# Patient Record
Sex: FEMALE | Race: WHITE | NOT HISPANIC OR LATINO | Employment: OTHER | URBAN - METROPOLITAN AREA
[De-identification: names, ages, dates, MRNs, and addresses within clinical notes are randomized per-mention and may not be internally consistent; named-entity substitution may affect disease eponyms.]

---

## 2023-01-20 ASSESSMENT — PATIENT HEALTH QUESTIONNAIRE - PHQ9
SUM OF ALL RESPONSES TO PHQ QUESTIONS 1-9: 13
2. FEELING DOWN, DEPRESSED OR HOPELESS: NEARLY EVERY DAY
9. THOUGHTS THAT YOU WOULD BE BETTER OFF DEAD, OR OF HURTING YOURSELF: SEVERAL DAYS
3. TROUBLE FALLING OR STAYING ASLEEP: NOT AT ALL
8. MOVING OR SPEAKING SO SLOWLY THAT OTHER PEOPLE COULD HAVE NOTICED. OR THE OPPOSITE, BEING SO FIGETY OR RESTLESS THAT YOU HAVE BEEN MOVING AROUND A LOT MORE THAN USUAL: SEVERAL DAYS
7. TROUBLE CONCENTRATING ON THINGS, SUCH AS READING THE NEWSPAPER OR WATCHING TELEVISION: SEVERAL DAYS
6. FEELING BAD ABOUT YOURSELF - OR THAT YOU ARE A FAILURE OR HAVE LET YOURSELF OR YOUR FAMILY DOWN: NEARLY EVERY DAY
1. LITTLE INTEREST OR PLEASURE IN DOING THINGS: NEARLY EVERY DAY
10. IF YOU CHECKED OFF ANY PROBLEMS, HOW DIFFICULT HAVE THESE PROBLEMS MADE IT FOR YOU TO DO YOUR WORK, TAKE CARE OF THINGS AT HOME, OR GET ALONG WITH OTHER PEOPLE: VERY DIFFICULT
SUM OF ALL RESPONSES TO PHQ9 QUESTIONS 1 & 2: 6
4. FEELING TIRED OR HAVING LITTLE ENERGY: SEVERAL DAYS
5. POOR APPETITE OR OVEREATING: NOT AT ALL

## 2023-01-20 ASSESSMENT — ANXIETY QUESTIONNAIRES
3. WORRYING TOO MUCH ABOUT DIFFERENT THINGS: NEARLY EVERY DAY
6. BECOMING EASILY ANNOYED OR IRRITABLE: NEARLY EVERY DAY
5. BEING SO RESTLESS THAT IT IS HARD TO SIT STILL: NOT AT ALL
1. FEELING NERVOUS, ANXIOUS, OR ON EDGE: NEARLY EVERY DAY
7. FEELING AFRAID AS IF SOMETHING AWFUL MIGHT HAPPEN: NOT AT ALL
GAD7 TOTAL SCORE: 13
4. TROUBLE RELAXING: SEVERAL DAYS
2. NOT BEING ABLE TO STOP OR CONTROL WORRYING: NEARLY EVERY DAY
IF YOU CHECKED OFF ANY PROBLEMS ON THIS QUESTIONNAIRE, HOW DIFFICULT HAVE THESE PROBLEMS MADE IT FOR YOU TO DO YOUR WORK, TAKE CARE OF THINGS AT HOME, OR GET ALONG WITH OTHER PEOPLE: SOMEWHAT DIFFICULT

## 2023-02-14 ASSESSMENT — PATIENT HEALTH QUESTIONNAIRE - PHQ9
SUM OF ALL RESPONSES TO PHQ QUESTIONS 1-9: 19
7. TROUBLE CONCENTRATING ON THINGS, SUCH AS READING THE NEWSPAPER OR WATCHING TELEVISION: MORE THAN HALF THE DAYS
SUM OF ALL RESPONSES TO PHQ9 QUESTIONS 1 & 2: 6
9. THOUGHTS THAT YOU WOULD BE BETTER OFF DEAD, OR OF HURTING YOURSELF: NOT AT ALL
6. FEELING BAD ABOUT YOURSELF - OR THAT YOU ARE A FAILURE OR HAVE LET YOURSELF OR YOUR FAMILY DOWN: NEARLY EVERY DAY
3. TROUBLE FALLING OR STAYING ASLEEP: SEVERAL DAYS
10. IF YOU CHECKED OFF ANY PROBLEMS, HOW DIFFICULT HAVE THESE PROBLEMS MADE IT FOR YOU TO DO YOUR WORK, TAKE CARE OF THINGS AT HOME, OR GET ALONG WITH OTHER PEOPLE: VERY DIFFICULT
8. MOVING OR SPEAKING SO SLOWLY THAT OTHER PEOPLE COULD HAVE NOTICED. OR THE OPPOSITE, BEING SO FIGETY OR RESTLESS THAT YOU HAVE BEEN MOVING AROUND A LOT MORE THAN USUAL: NEARLY EVERY DAY
2. FEELING DOWN, DEPRESSED OR HOPELESS: NEARLY EVERY DAY
1. LITTLE INTEREST OR PLEASURE IN DOING THINGS: NEARLY EVERY DAY
4. FEELING TIRED OR HAVING LITTLE ENERGY: MORE THAN HALF THE DAYS
5. POOR APPETITE OR OVEREATING: MORE THAN HALF THE DAYS

## 2023-02-14 ASSESSMENT — ANXIETY QUESTIONNAIRES
3. WORRYING TOO MUCH ABOUT DIFFERENT THINGS: NEARLY EVERY DAY
1. FEELING NERVOUS, ANXIOUS, OR ON EDGE: NEARLY EVERY DAY
6. BECOMING EASILY ANNOYED OR IRRITABLE: MORE THAN HALF THE DAYS
4. TROUBLE RELAXING: NEARLY EVERY DAY
5. BEING SO RESTLESS THAT IT IS HARD TO SIT STILL: MORE THAN HALF THE DAYS
2. NOT BEING ABLE TO STOP OR CONTROL WORRYING: MORE THAN HALF THE DAYS
7. FEELING AFRAID AS IF SOMETHING AWFUL MIGHT HAPPEN: NOT AT ALL
IF YOU CHECKED OFF ANY PROBLEMS ON THIS QUESTIONNAIRE, HOW DIFFICULT HAVE THESE PROBLEMS MADE IT FOR YOU TO DO YOUR WORK, TAKE CARE OF THINGS AT HOME, OR GET ALONG WITH OTHER PEOPLE: NOT DIFFICULT AT ALL
GAD7 TOTAL SCORE: 15

## 2023-03-04 PROBLEM — R03.0 ELEVATED BP WITHOUT DIAGNOSIS OF HYPERTENSION: Status: ACTIVE | Noted: 2023-03-04

## 2023-03-04 PROBLEM — E66.811 CLASS 1 OBESITY WITH BODY MASS INDEX (BMI) OF 31.0 TO 31.9 IN ADULT: Status: ACTIVE | Noted: 2023-03-04

## 2023-03-04 PROBLEM — L30.9 DERMATITIS, ECZEMATOID: Status: ACTIVE | Noted: 2023-03-04

## 2023-03-04 PROBLEM — F34.1 PRIMARY DYSTHYMIA: Status: ACTIVE | Noted: 2023-03-04

## 2023-03-04 PROBLEM — G47.00 PERSISTENT INSOMNIA: Status: ACTIVE | Noted: 2023-03-04

## 2023-03-04 PROBLEM — K44.9 HIATAL HERNIA: Status: ACTIVE | Noted: 2023-03-04

## 2023-03-04 PROBLEM — K59.09 CHRONIC CONSTIPATION: Status: ACTIVE | Noted: 2023-03-04

## 2023-03-04 PROBLEM — E03.9 ADULT HYPOTHYROIDISM: Status: ACTIVE | Noted: 2023-03-04

## 2023-03-04 PROBLEM — E66.9 CLASS 1 OBESITY WITH BODY MASS INDEX (BMI) OF 31.0 TO 31.9 IN ADULT: Status: ACTIVE | Noted: 2023-03-04

## 2023-03-04 PROBLEM — D72.829 LEUKOCYTOSIS: Status: ACTIVE | Noted: 2023-03-04

## 2023-03-04 PROBLEM — R73.09 ELEVATED GLUCOSE: Status: ACTIVE | Noted: 2023-03-04

## 2023-03-04 PROBLEM — F43.23 SITUATIONAL MIXED ANXIETY AND DEPRESSIVE DISORDER: Status: ACTIVE | Noted: 2023-03-04

## 2023-03-04 PROBLEM — M54.50 LOW BACK PAIN: Status: ACTIVE | Noted: 2023-03-04

## 2023-03-04 PROBLEM — F32.4 DEPRESSION, MAJOR, IN PARTIAL REMISSION (CMS-HCC): Status: ACTIVE | Noted: 2023-03-04

## 2023-03-04 PROBLEM — E78.5 HYPERLIPIDEMIA: Status: ACTIVE | Noted: 2023-03-04

## 2023-03-04 PROBLEM — H69.93 DYSFUNCTION OF BOTH EUSTACHIAN TUBES: Status: ACTIVE | Noted: 2023-03-04

## 2023-03-04 PROBLEM — K21.9 GERD (GASTROESOPHAGEAL REFLUX DISEASE): Status: ACTIVE | Noted: 2023-03-04

## 2023-03-04 PROBLEM — R14.0 ABDOMINAL BLOATING: Status: ACTIVE | Noted: 2023-03-04

## 2023-03-04 PROBLEM — R41.3 MEMORY DEFICIT: Status: ACTIVE | Noted: 2023-03-04

## 2023-03-04 PROBLEM — S22.070A COMPRESSION FRACTURE OF T10 VERTEBRA (MULTI): Status: ACTIVE | Noted: 2023-03-04

## 2023-03-04 RX ORDER — CHOLECALCIFEROL (VITAMIN D3) 50 MCG
TABLET ORAL
COMMUNITY

## 2023-03-04 RX ORDER — LEVOTHYROXINE SODIUM 25 UG/1
1 TABLET ORAL DAILY
COMMUNITY
Start: 2022-10-10 | End: 2023-05-22

## 2023-03-04 RX ORDER — OMEPRAZOLE 40 MG/1
1 CAPSULE, DELAYED RELEASE ORAL DAILY
COMMUNITY
Start: 2022-07-26 | End: 2023-07-10

## 2023-03-04 RX ORDER — CALCIUM CARBONATE 300MG(750)
TABLET,CHEWABLE ORAL
COMMUNITY
Start: 2020-11-17

## 2023-03-04 RX ORDER — SIMVASTATIN 40 MG/1
1 TABLET, FILM COATED ORAL DAILY
COMMUNITY
Start: 2013-09-03 | End: 2023-09-21 | Stop reason: SDUPTHER

## 2023-03-04 RX ORDER — FAMOTIDINE 40 MG/1
1 TABLET, FILM COATED ORAL 2 TIMES DAILY
COMMUNITY
Start: 2022-10-18 | End: 2023-06-19 | Stop reason: SDUPTHER

## 2023-03-04 RX ORDER — PROPRANOLOL HYDROCHLORIDE 10 MG/1
1 TABLET ORAL AS NEEDED
COMMUNITY
Start: 2022-01-27 | End: 2023-04-03

## 2023-03-04 RX ORDER — KETOROLAC TROMETHAMINE 30 MG/ML
2 INJECTION, SOLUTION INTRAMUSCULAR; INTRAVENOUS
COMMUNITY
Start: 2022-10-13 | End: 2023-05-22 | Stop reason: ALTCHOICE

## 2023-03-04 RX ORDER — MULTIVITAMIN
TABLET ORAL
COMMUNITY

## 2023-03-04 RX ORDER — BUPROPION HYDROCHLORIDE 150 MG/1
1 TABLET ORAL DAILY
COMMUNITY
Start: 2022-07-26 | End: 2023-07-10

## 2023-03-04 RX ORDER — ASPIRIN 81 MG/1
TABLET ORAL
COMMUNITY

## 2023-03-04 RX ORDER — CITALOPRAM 20 MG/1
2 TABLET, FILM COATED ORAL DAILY
COMMUNITY
End: 2023-07-20 | Stop reason: DRUGHIGH

## 2023-03-04 RX ORDER — FLUTICASONE PROPIONATE 50 MCG
2 SPRAY, SUSPENSION (ML) NASAL DAILY
COMMUNITY
Start: 2020-09-24 | End: 2023-04-03

## 2023-03-08 ENCOUNTER — SOCIAL WORK (OUTPATIENT)
Dept: PRIMARY CARE | Facility: CLINIC | Age: 83
End: 2023-03-08
Payer: MEDICARE

## 2023-03-08 NOTE — PROGRESS NOTES
Collaborative Care (CoCM)  Progress Note    Type of Interaction: In Office    Start Time: 1:00 PM    End Time: 1:45 PM        Appointment: Scheduled    Reason for Visit: No chief complaint on file.   Follow Up Visit with Collaborative Care      Interval History / Patient Symptoms:     No data recorded  Patient reports that she is feeling better overall.  States that she has been taking her medication as prescribed.  Brought in her HW from previous appointment; Patient has been socializing, going to Worship and shopping with her daughter.      Interventions Provided: Behavioral Activation and Homework F/U      Progress Made: Moderate    Response to Intervention: Patient reported feeling more motivated to clean her dining room table; states she feels better when she accomplishes tasks.        Plan:     Care Plan    There is no care plan documentation to display.         There are no Patient Instructions on file for this visit.      Follow Up / Next Appointment:

## 2023-03-22 ENCOUNTER — SOCIAL WORK (OUTPATIENT)
Dept: PRIMARY CARE | Facility: CLINIC | Age: 83
End: 2023-03-22
Payer: MEDICARE

## 2023-03-22 ASSESSMENT — PATIENT HEALTH QUESTIONNAIRE - PHQ9
2. FEELING DOWN, DEPRESSED OR HOPELESS: SEVERAL DAYS
1. LITTLE INTEREST OR PLEASURE IN DOING THINGS: SEVERAL DAYS
4. FEELING TIRED OR HAVING LITTLE ENERGY: NOT AT ALL
3. TROUBLE FALLING OR STAYING ASLEEP: NOT AT ALL
10. IF YOU CHECKED OFF ANY PROBLEMS, HOW DIFFICULT HAVE THESE PROBLEMS MADE IT FOR YOU TO DO YOUR WORK, TAKE CARE OF THINGS AT HOME, OR GET ALONG WITH OTHER PEOPLE: SOMEWHAT DIFFICULT
6. FEELING BAD ABOUT YOURSELF - OR THAT YOU ARE A FAILURE OR HAVE LET YOURSELF OR YOUR FAMILY DOWN: SEVERAL DAYS
9. THOUGHTS THAT YOU WOULD BE BETTER OFF DEAD, OR OF HURTING YOURSELF: NOT AT ALL
8. MOVING OR SPEAKING SO SLOWLY THAT OTHER PEOPLE COULD HAVE NOTICED. OR THE OPPOSITE, BEING SO FIGETY OR RESTLESS THAT YOU HAVE BEEN MOVING AROUND A LOT MORE THAN USUAL: NOT AT ALL
5. POOR APPETITE OR OVEREATING: NOT AT ALL
7. TROUBLE CONCENTRATING ON THINGS, SUCH AS READING THE NEWSPAPER OR WATCHING TELEVISION: SEVERAL DAYS
SUM OF ALL RESPONSES TO PHQ9 QUESTIONS 1 & 2: 2
SUM OF ALL RESPONSES TO PHQ QUESTIONS 1-9: 4

## 2023-03-22 ASSESSMENT — ANXIETY QUESTIONNAIRES
5. BEING SO RESTLESS THAT IT IS HARD TO SIT STILL: SEVERAL DAYS
2. NOT BEING ABLE TO STOP OR CONTROL WORRYING: MORE THAN HALF THE DAYS
7. FEELING AFRAID AS IF SOMETHING AWFUL MIGHT HAPPEN: NOT AT ALL
GAD7 TOTAL SCORE: 8
4. TROUBLE RELAXING: MORE THAN HALF THE DAYS
3. WORRYING TOO MUCH ABOUT DIFFERENT THINGS: SEVERAL DAYS
1. FEELING NERVOUS, ANXIOUS, OR ON EDGE: SEVERAL DAYS
IF YOU CHECKED OFF ANY PROBLEMS ON THIS QUESTIONNAIRE, HOW DIFFICULT HAVE THESE PROBLEMS MADE IT FOR YOU TO DO YOUR WORK, TAKE CARE OF THINGS AT HOME, OR GET ALONG WITH OTHER PEOPLE: SOMEWHAT DIFFICULT
6. BECOMING EASILY ANNOYED OR IRRITABLE: SEVERAL DAYS

## 2023-03-22 NOTE — PROGRESS NOTES
Collaborative Care (CoCM)  Progress Note    Type of Interaction: In Office    Start Time: 1:04 PM    End Time: 2:00 PM        Appointment: Scheduled    Reason for Visit:   Chief Complaint   Patient presents with    Follow-up    2 week follow up with CoCM      Interval History / Patient Symptoms:     Patient Health Questionnaire-9 Score: 4 (3/22/2023  1:39 PM)  ROJELIO-7 Total Score: 8 (3/22/2023  1:27 PM)    Patient reports socializing more over the past 2 weeks; states that she has attended 2 funerals and went to the luncheon after the  this week.  States more motivation to call friends and get out and about.    Wellbutrin 150mg- doing well on dose, takes medication as prescribed and doesn't miss doses, per patient.    Patient's PHQ and ROJELIO scores improved.    Interventions Provided: Behavioral Activation      Progress Made: Moderate    Response to Intervention: Patient describes feeling more accountable in her actions and what she needs to be doing, and has been following through with suggestions from providers regarding her health and wellbeing.          Plan:     Care Plan    There is no care plan documentation to display.         There are no Patient Instructions on file for this visit.      Follow Up / Next Appointment:

## 2023-03-24 DIAGNOSIS — H69.83 OTHER SPECIFIED DISORDERS OF EUSTACHIAN TUBE, BILATERAL: ICD-10-CM

## 2023-03-24 DIAGNOSIS — F41.9 ANXIETY: Primary | ICD-10-CM

## 2023-04-03 RX ORDER — FLUTICASONE PROPIONATE 50 MCG
SPRAY, SUSPENSION (ML) NASAL
Qty: 48 ML | Refills: 1 | Status: SHIPPED | OUTPATIENT
Start: 2023-04-03 | End: 2023-09-21 | Stop reason: SDUPTHER

## 2023-04-03 RX ORDER — PROPRANOLOL HYDROCHLORIDE 10 MG/1
TABLET ORAL
Qty: 90 TABLET | Refills: 1 | Status: SHIPPED | OUTPATIENT
Start: 2023-04-03 | End: 2023-04-06 | Stop reason: SDUPTHER

## 2023-04-06 ENCOUNTER — SOCIAL WORK (OUTPATIENT)
Dept: PRIMARY CARE | Facility: CLINIC | Age: 83
End: 2023-04-06
Payer: MEDICARE

## 2023-04-06 DIAGNOSIS — F41.9 ANXIETY: ICD-10-CM

## 2023-04-06 ASSESSMENT — PATIENT HEALTH QUESTIONNAIRE - PHQ9
3. TROUBLE FALLING OR STAYING ASLEEP: NOT AT ALL
1. LITTLE INTEREST OR PLEASURE IN DOING THINGS: SEVERAL DAYS
SUM OF ALL RESPONSES TO PHQ9 QUESTIONS 1 & 2: 1
6. FEELING BAD ABOUT YOURSELF - OR THAT YOU ARE A FAILURE OR HAVE LET YOURSELF OR YOUR FAMILY DOWN: SEVERAL DAYS
8. MOVING OR SPEAKING SO SLOWLY THAT OTHER PEOPLE COULD HAVE NOTICED. OR THE OPPOSITE, BEING SO FIGETY OR RESTLESS THAT YOU HAVE BEEN MOVING AROUND A LOT MORE THAN USUAL: SEVERAL DAYS
10. IF YOU CHECKED OFF ANY PROBLEMS, HOW DIFFICULT HAVE THESE PROBLEMS MADE IT FOR YOU TO DO YOUR WORK, TAKE CARE OF THINGS AT HOME, OR GET ALONG WITH OTHER PEOPLE: NOT DIFFICULT AT ALL
9. THOUGHTS THAT YOU WOULD BE BETTER OFF DEAD, OR OF HURTING YOURSELF: NOT AT ALL
2. FEELING DOWN, DEPRESSED OR HOPELESS: NOT AT ALL
7. TROUBLE CONCENTRATING ON THINGS, SUCH AS READING THE NEWSPAPER OR WATCHING TELEVISION: SEVERAL DAYS
4. FEELING TIRED OR HAVING LITTLE ENERGY: SEVERAL DAYS
5. POOR APPETITE OR OVEREATING: NOT AT ALL
SUM OF ALL RESPONSES TO PHQ QUESTIONS 1-9: 5

## 2023-04-06 ASSESSMENT — ANXIETY QUESTIONNAIRES
IF YOU CHECKED OFF ANY PROBLEMS ON THIS QUESTIONNAIRE, HOW DIFFICULT HAVE THESE PROBLEMS MADE IT FOR YOU TO DO YOUR WORK, TAKE CARE OF THINGS AT HOME, OR GET ALONG WITH OTHER PEOPLE: SOMEWHAT DIFFICULT
2. NOT BEING ABLE TO STOP OR CONTROL WORRYING: SEVERAL DAYS
6. BECOMING EASILY ANNOYED OR IRRITABLE: NOT AT ALL
5. BEING SO RESTLESS THAT IT IS HARD TO SIT STILL: SEVERAL DAYS
1. FEELING NERVOUS, ANXIOUS, OR ON EDGE: SEVERAL DAYS
3. WORRYING TOO MUCH ABOUT DIFFERENT THINGS: SEVERAL DAYS
4. TROUBLE RELAXING: SEVERAL DAYS
7. FEELING AFRAID AS IF SOMETHING AWFUL MIGHT HAPPEN: NOT AT ALL
GAD7 TOTAL SCORE: 5

## 2023-04-06 NOTE — PROGRESS NOTES
Collaborative Care (CoCM)  Progress Note    Type of Interaction: In Office    Start Time: 1:02PM    End Time: 1:45 PM        Appointment: Scheduled    Reason for Visit:   Chief Complaint   Patient presents with    Follow-up    Depression    Anxiety          Interval History / Patient Symptoms:     Patient Health Questionnaire-9 Score: 5 (4/6/2023  1:22 PM)  ROJELIO-7 Total Score: 5 (4/6/2023  1:19 PM)      Bloodwork all came back ok  Still stomach issues  Funerals- saw people and reconnected  Went to [employer] for a day with daughter and saw family  Went to the [employer]- ate dinner out  Has not been to [employer] lately  Went to mall and walked  Feels better overall    Interventions Provided: Behavioral Activation      Progress Made: Moderate    Response to Intervention: Explored continuing behavioral activation with patient; identified ways that she has broken the cycle of depression and ways to continue to engage in activities that she enjoys.  Patient reports that she often thinks about what we talk about during appointments and tries to utilize the strategies.        Plan:     Care Plan    There is no care plan documentation to display.         There are no Patient Instructions on file for this visit.      Follow Up / Next Appointment: Next appointment: 04/20/23

## 2023-04-07 ENCOUNTER — DOCUMENTATION (OUTPATIENT)
Dept: PRIMARY CARE | Facility: CLINIC | Age: 83
End: 2023-04-07
Payer: MEDICARE

## 2023-04-07 DIAGNOSIS — F33.41 RECURRENT MAJOR DEPRESSIVE DISORDER, IN PARTIAL REMISSION (CMS-HCC): Primary | ICD-10-CM

## 2023-04-07 PROCEDURE — 99493 SBSQ PSYC COLLAB CARE MGMT: CPT | Performed by: FAMILY MEDICINE

## 2023-04-07 PROCEDURE — 99494 1ST/SBSQ PSYC COLLAB CARE: CPT | Performed by: FAMILY MEDICINE

## 2023-04-07 RX ORDER — PROPRANOLOL HYDROCHLORIDE 10 MG/1
TABLET ORAL
Qty: 90 TABLET | Refills: 0 | Status: SHIPPED | OUTPATIENT
Start: 2023-04-07 | End: 2023-10-10

## 2023-04-20 ENCOUNTER — SOCIAL WORK (OUTPATIENT)
Dept: PRIMARY CARE | Facility: CLINIC | Age: 83
End: 2023-04-20
Payer: MEDICARE

## 2023-04-20 ENCOUNTER — TELEPHONE (OUTPATIENT)
Dept: PRIMARY CARE | Facility: CLINIC | Age: 83
End: 2023-04-20

## 2023-04-20 ASSESSMENT — PATIENT HEALTH QUESTIONNAIRE - PHQ9
3. TROUBLE FALLING OR STAYING ASLEEP: SEVERAL DAYS
SUM OF ALL RESPONSES TO PHQ9 QUESTIONS 1 & 2: 1
5. POOR APPETITE OR OVEREATING: NOT AT ALL
2. FEELING DOWN, DEPRESSED OR HOPELESS: NOT AT ALL
1. LITTLE INTEREST OR PLEASURE IN DOING THINGS: SEVERAL DAYS
9. THOUGHTS THAT YOU WOULD BE BETTER OFF DEAD, OR OF HURTING YOURSELF: NOT AT ALL
6. FEELING BAD ABOUT YOURSELF - OR THAT YOU ARE A FAILURE OR HAVE LET YOURSELF OR YOUR FAMILY DOWN: NOT AT ALL
10. IF YOU CHECKED OFF ANY PROBLEMS, HOW DIFFICULT HAVE THESE PROBLEMS MADE IT FOR YOU TO DO YOUR WORK, TAKE CARE OF THINGS AT HOME, OR GET ALONG WITH OTHER PEOPLE: SOMEWHAT DIFFICULT
8. MOVING OR SPEAKING SO SLOWLY THAT OTHER PEOPLE COULD HAVE NOTICED. OR THE OPPOSITE, BEING SO FIGETY OR RESTLESS THAT YOU HAVE BEEN MOVING AROUND A LOT MORE THAN USUAL: NOT AT ALL
SUM OF ALL RESPONSES TO PHQ QUESTIONS 1-9: 4
7. TROUBLE CONCENTRATING ON THINGS, SUCH AS READING THE NEWSPAPER OR WATCHING TELEVISION: SEVERAL DAYS
4. FEELING TIRED OR HAVING LITTLE ENERGY: SEVERAL DAYS

## 2023-04-20 ASSESSMENT — ANXIETY QUESTIONNAIRES
GAD7 TOTAL SCORE: 3
3. WORRYING TOO MUCH ABOUT DIFFERENT THINGS: SEVERAL DAYS
4. TROUBLE RELAXING: NOT AT ALL
7. FEELING AFRAID AS IF SOMETHING AWFUL MIGHT HAPPEN: NOT AT ALL
IF YOU CHECKED OFF ANY PROBLEMS ON THIS QUESTIONNAIRE, HOW DIFFICULT HAVE THESE PROBLEMS MADE IT FOR YOU TO DO YOUR WORK, TAKE CARE OF THINGS AT HOME, OR GET ALONG WITH OTHER PEOPLE: SOMEWHAT DIFFICULT
5. BEING SO RESTLESS THAT IT IS HARD TO SIT STILL: NOT AT ALL
2. NOT BEING ABLE TO STOP OR CONTROL WORRYING: NOT AT ALL
1. FEELING NERVOUS, ANXIOUS, OR ON EDGE: SEVERAL DAYS
6. BECOMING EASILY ANNOYED OR IRRITABLE: SEVERAL DAYS

## 2023-04-20 NOTE — TELEPHONE ENCOUNTER
"Patient stopped and spoke with me today and she was states that she has stopped taking her Synthroid for the last week and she states she is \"feeling much better\" and is not wanting to restart, she is asking if she can stay off of this or does she need to go back on it?  "

## 2023-04-20 NOTE — PROGRESS NOTES
"Collaborative Care (CoCM)  Progress Note    Type of Interaction: In Office    Start Time: 1:00 PM    End Time: 1:45 PM        Appointment: Scheduled    Reason for Visit:   Chief Complaint   Patient presents with    Follow-up    Anxiety    Depression          Interval History / Patient Symptoms:     Patient Health Questionnaire-9 Score: 4 (4/20/2023  1:15 PM)  ROJELIO-7 Total Score: 3 (4/20/2023  1:15 PM)    Ultrasound on Saturday re: stomach  Took self off synthroid by herself- states that she has been more clear-headed \"I feel like my head's back on\"- week to 10 days    Been walking more  At the mall, Grocery store  [employer]  Went to dinner with daughter and friends  Took friend who has cancer to lunch and shopping  Sleep is good; well balanced meals      Interventions Provided: Behavioral Activation      Progress Made: Moderate    Response to Intervention:   Admits she needs to clean my dining room table  Worried about Saturday- will tackle other issues after that  When feeling anxious- keeping busy to take mind off; went to library and got a book; hasn't gotten a puzzle yet    Assisted patient with scheduling appointment for thyroid concerns    Plan:     Care Plan    There is no care plan documentation to display.         There are no Patient Instructions on file for this visit.      Follow Up / Next Appointment: Next appointment: 05/04/23      "

## 2023-04-24 DIAGNOSIS — E03.9 ADULT HYPOTHYROIDISM: Primary | ICD-10-CM

## 2023-05-01 ENCOUNTER — DOCUMENTATION (OUTPATIENT)
Dept: PRIMARY CARE | Facility: CLINIC | Age: 83
End: 2023-05-01
Payer: MEDICARE

## 2023-05-01 DIAGNOSIS — F32.4 MAJOR DEPRESSIVE DISORDER IN PARTIAL REMISSION, UNSPECIFIED WHETHER RECURRENT (CMS-HCC): Primary | ICD-10-CM

## 2023-05-01 PROCEDURE — 99493 SBSQ PSYC COLLAB CARE MGMT: CPT | Performed by: FAMILY MEDICINE

## 2023-05-01 PROCEDURE — 99494 1ST/SBSQ PSYC COLLAB CARE: CPT | Performed by: FAMILY MEDICINE

## 2023-05-04 ENCOUNTER — SOCIAL WORK (OUTPATIENT)
Dept: PRIMARY CARE | Facility: CLINIC | Age: 83
End: 2023-05-04
Payer: MEDICARE

## 2023-05-04 DIAGNOSIS — F32.4 MAJOR DEPRESSIVE DISORDER IN PARTIAL REMISSION, UNSPECIFIED WHETHER RECURRENT (CMS-HCC): Primary | ICD-10-CM

## 2023-05-04 ASSESSMENT — ANXIETY QUESTIONNAIRES
GAD7 TOTAL SCORE: 2
6. BECOMING EASILY ANNOYED OR IRRITABLE: NOT AT ALL
5. BEING SO RESTLESS THAT IT IS HARD TO SIT STILL: SEVERAL DAYS
7. FEELING AFRAID AS IF SOMETHING AWFUL MIGHT HAPPEN: NOT AT ALL
4. TROUBLE RELAXING: SEVERAL DAYS
3. WORRYING TOO MUCH ABOUT DIFFERENT THINGS: NOT AT ALL
2. NOT BEING ABLE TO STOP OR CONTROL WORRYING: NOT AT ALL
IF YOU CHECKED OFF ANY PROBLEMS ON THIS QUESTIONNAIRE, HOW DIFFICULT HAVE THESE PROBLEMS MADE IT FOR YOU TO DO YOUR WORK, TAKE CARE OF THINGS AT HOME, OR GET ALONG WITH OTHER PEOPLE: NOT DIFFICULT AT ALL
1. FEELING NERVOUS, ANXIOUS, OR ON EDGE: NOT AT ALL

## 2023-05-04 ASSESSMENT — PATIENT HEALTH QUESTIONNAIRE - PHQ9
6. FEELING BAD ABOUT YOURSELF - OR THAT YOU ARE A FAILURE OR HAVE LET YOURSELF OR YOUR FAMILY DOWN: SEVERAL DAYS
7. TROUBLE CONCENTRATING ON THINGS, SUCH AS READING THE NEWSPAPER OR WATCHING TELEVISION: SEVERAL DAYS
3. TROUBLE FALLING OR STAYING ASLEEP: NOT AT ALL
SUM OF ALL RESPONSES TO PHQ QUESTIONS 1-9: 4
5. POOR APPETITE OR OVEREATING: NOT AT ALL
SUM OF ALL RESPONSES TO PHQ9 QUESTIONS 1 & 2: 2
9. THOUGHTS THAT YOU WOULD BE BETTER OFF DEAD, OR OF HURTING YOURSELF: NOT AT ALL
1. LITTLE INTEREST OR PLEASURE IN DOING THINGS: SEVERAL DAYS
8. MOVING OR SPEAKING SO SLOWLY THAT OTHER PEOPLE COULD HAVE NOTICED. OR THE OPPOSITE, BEING SO FIGETY OR RESTLESS THAT YOU HAVE BEEN MOVING AROUND A LOT MORE THAN USUAL: NOT AT ALL
4. FEELING TIRED OR HAVING LITTLE ENERGY: NOT AT ALL
10. IF YOU CHECKED OFF ANY PROBLEMS, HOW DIFFICULT HAVE THESE PROBLEMS MADE IT FOR YOU TO DO YOUR WORK, TAKE CARE OF THINGS AT HOME, OR GET ALONG WITH OTHER PEOPLE: NOT DIFFICULT AT ALL
2. FEELING DOWN, DEPRESSED OR HOPELESS: SEVERAL DAYS

## 2023-05-04 NOTE — PROGRESS NOTES
Collaborative Care (CoCM)  Progress Note    Type of Interaction: In Office    Start Time: 1:05 PM    End Time: 1:45 PM    Appointment: Scheduled    Reason for Visit:   Chief Complaint   Patient presents with    Follow-up      Interval History / Patient Symptoms:     Patient Health Questionnaire-9 Score: 4 (5/4/2023  1:48 PM)  ROJELIO-7 Total Score: 2 (5/4/2023  1:47 PM)    Went to [facility] yesterday  Has been out to dinner a few times  Took initiative to go to neighbor's house    Interventions Provided: Behavioral Activation and Motivational Interviewing      Progress Made: Significant    Response to Intervention:   Patient engaged and participated in discussion regarding progress made and continuing to utilize behavioral activation.        Plan:     Work on: exercising   Read  [employer] group/S[employer]ian[employer] group      Follow Up / Next Appointment: Next appointment: 05/18/23

## 2023-05-18 ENCOUNTER — APPOINTMENT (OUTPATIENT)
Dept: PRIMARY CARE | Facility: CLINIC | Age: 83
End: 2023-05-18
Payer: MEDICARE

## 2023-05-19 ENCOUNTER — SOCIAL WORK (OUTPATIENT)
Dept: PRIMARY CARE | Facility: CLINIC | Age: 83
End: 2023-05-19
Payer: MEDICARE

## 2023-05-19 DIAGNOSIS — F32.4 MAJOR DEPRESSIVE DISORDER IN PARTIAL REMISSION, UNSPECIFIED WHETHER RECURRENT (CMS-HCC): Primary | ICD-10-CM

## 2023-05-19 NOTE — PROGRESS NOTES
Collaborative Care (CoCM)  Progress Note    Type of Interaction: In Office    Start Time: 1:05 PM    End Time: 1:51 PM        Appointment: Scheduled    Reason for Visit:   Chief Complaint   Patient presents with    Follow-up    Depression      Interval History / Patient Symptoms:     Continues to take meds as prescribed      Interventions Provided: Motivational Interviewing      Progress Made: Moderate    Response to Intervention:   Still a little stressed out- struggles to come to terms and understand health issues of recent   needs to go for walks more often- makes her feel better  Got a puzzle to do  Continues to socialize with friends  Reading magazines and newspapers  Takes friend to get hair done and chats with the other people there    Plan:     Continue to work on staying active   Reduce anxiety of health problems by making a PCP appointment      Follow Up / Next Appointment: Next appointment: 06/05/23

## 2023-05-22 ENCOUNTER — OFFICE VISIT (OUTPATIENT)
Dept: PRIMARY CARE | Facility: CLINIC | Age: 83
End: 2023-05-22
Payer: MEDICARE

## 2023-05-22 VITALS — OXYGEN SATURATION: 97 % | SYSTOLIC BLOOD PRESSURE: 124 MMHG | DIASTOLIC BLOOD PRESSURE: 80 MMHG | HEART RATE: 80 BPM

## 2023-05-22 DIAGNOSIS — D72.829 LEUKOCYTOSIS, UNSPECIFIED TYPE: Primary | ICD-10-CM

## 2023-05-22 DIAGNOSIS — J40 BRONCHITIS: ICD-10-CM

## 2023-05-22 DIAGNOSIS — F33.41 RECURRENT MAJOR DEPRESSIVE DISORDER, IN PARTIAL REMISSION (CMS-HCC): ICD-10-CM

## 2023-05-22 PROCEDURE — 99214 OFFICE O/P EST MOD 30 MIN: CPT | Performed by: FAMILY MEDICINE

## 2023-05-22 PROCEDURE — 1159F MED LIST DOCD IN RCRD: CPT | Performed by: FAMILY MEDICINE

## 2023-05-22 PROCEDURE — 1160F RVW MEDS BY RX/DR IN RCRD: CPT | Performed by: FAMILY MEDICINE

## 2023-05-22 PROCEDURE — 1036F TOBACCO NON-USER: CPT | Performed by: FAMILY MEDICINE

## 2023-05-22 PROCEDURE — 1157F ADVNC CARE PLAN IN RCRD: CPT | Performed by: FAMILY MEDICINE

## 2023-05-22 RX ORDER — AZITHROMYCIN 250 MG/1
TABLET, FILM COATED ORAL
Qty: 6 TABLET | Refills: 0 | Status: SHIPPED | OUTPATIENT
Start: 2023-05-22 | End: 2023-05-27

## 2023-05-22 RX ORDER — ALBUTEROL SULFATE 90 UG/1
2 AEROSOL, METERED RESPIRATORY (INHALATION) EVERY 4 HOURS PRN
Qty: 8.5 G | Refills: 0 | Status: SHIPPED | OUTPATIENT
Start: 2023-05-22 | End: 2024-05-21

## 2023-05-22 NOTE — PATIENT INSTRUCTIONS
Mucinex DM - for your cough.    Zithromax 250 mg - take 2 tablets today and then 1 tablet daily for 4 days.    Albuterol MDI 2 puffs every 4-6 hours as needed.

## 2023-05-22 NOTE — PROGRESS NOTES
Subjective   Patient ID: Diana Azul is a 83 y.o. female who presents for Follow-up.  HPI    CONGESTION & COLD: she has had congestion for the last several weeks   Treated at [facility] an treated with atb and Tessalon  Finished a few days ago and not much better     LEUKOCYTOSIS: Follow up with heme/oncology     HYPERLIPIDEMIA: tolerating simvastatin without muscle achiness or weakness       Review of Systems    Review of Systems negative except as noted in HPI and Chief complaint.     Objective               Physical Exam  Constitutional:       General: She is not in acute distress.     Appearance: Normal appearance. She is not ill-appearing.   HENT:      Head: Normocephalic and atraumatic.   Neck:      Vascular: No carotid bruit.   Cardiovascular:      Rate and Rhythm: Normal rate and regular rhythm.      Pulses: Normal pulses.      Heart sounds: Normal heart sounds. No murmur heard.     No gallop.   Pulmonary:      Effort: Pulmonary effort is normal.      Breath sounds: Rhonchi present. No wheezing or rales.      Comments: Course rhonchi anteriorly - clears somewhat with cough  Musculoskeletal:      Cervical back: Normal range of motion and neck supple. No rigidity or tenderness.   Lymphadenopathy:      Cervical: No cervical adenopathy.   Skin:     General: Skin is warm and dry.   Neurological:      Mental Status: She is alert.   Psychiatric:         Mood and Affect: Mood normal.         Behavior: Behavior normal.       /80 (BP Location: Left arm, Patient Position: Sitting, BP Cuff Size: Adult)   Pulse 80   SpO2 97%      Assessment/Plan   Problem List Items Addressed This Visit          Respiratory    Bronchitis    Relevant Medications    albuterol (ProAir HFA) 90 mcg/actuation inhaler       Hematologic    Leukocytosis - Primary    Relevant Orders    CBC and Auto Differential    Comprehensive Metabolic Panel       Other    Depression, major, in partial remission (CMS/HCC)     Chronic  Condition Documentation: Worsening based on symptoms and exam. Treatment plan established. Disease monitoring and precautions, any treatment changes, patient instructions, and follow-up are documented in encounter note.   Follow up 6 months.

## 2023-05-23 ENCOUNTER — TELEPHONE (OUTPATIENT)
Dept: PRIMARY CARE | Facility: CLINIC | Age: 83
End: 2023-05-23
Payer: MEDICARE

## 2023-05-23 NOTE — TELEPHONE ENCOUNTER
Called patient to schedule medicare wellness she states she will call back to schedule getting ready to leave the house

## 2023-05-31 PROBLEM — J40 BRONCHITIS: Status: ACTIVE | Noted: 2023-05-31

## 2023-05-31 NOTE — ASSESSMENT & PLAN NOTE
Chronic Condition Documentation: Worsening based on symptoms and exam. Treatment plan established. Disease monitoring and precautions, any treatment changes, patient instructions, and follow-up are documented in encounter note.   Follow up 6 months.

## 2023-06-05 ENCOUNTER — SOCIAL WORK (OUTPATIENT)
Dept: PRIMARY CARE | Facility: CLINIC | Age: 83
End: 2023-06-05
Payer: MEDICARE

## 2023-06-05 DIAGNOSIS — F32.4 MAJOR DEPRESSIVE DISORDER IN PARTIAL REMISSION, UNSPECIFIED WHETHER RECURRENT (CMS-HCC): Primary | ICD-10-CM

## 2023-06-05 ASSESSMENT — ANXIETY QUESTIONNAIRES
3. WORRYING TOO MUCH ABOUT DIFFERENT THINGS: SEVERAL DAYS
IF YOU CHECKED OFF ANY PROBLEMS ON THIS QUESTIONNAIRE, HOW DIFFICULT HAVE THESE PROBLEMS MADE IT FOR YOU TO DO YOUR WORK, TAKE CARE OF THINGS AT HOME, OR GET ALONG WITH OTHER PEOPLE: SOMEWHAT DIFFICULT
2. NOT BEING ABLE TO STOP OR CONTROL WORRYING: NOT AT ALL
1. FEELING NERVOUS, ANXIOUS, OR ON EDGE: SEVERAL DAYS
GAD7 TOTAL SCORE: 5
4. TROUBLE RELAXING: SEVERAL DAYS
5. BEING SO RESTLESS THAT IT IS HARD TO SIT STILL: SEVERAL DAYS
7. FEELING AFRAID AS IF SOMETHING AWFUL MIGHT HAPPEN: NOT AT ALL
6. BECOMING EASILY ANNOYED OR IRRITABLE: SEVERAL DAYS

## 2023-06-05 ASSESSMENT — PATIENT HEALTH QUESTIONNAIRE - PHQ9
SUM OF ALL RESPONSES TO PHQ9 QUESTIONS 1 & 2: 3
8. MOVING OR SPEAKING SO SLOWLY THAT OTHER PEOPLE COULD HAVE NOTICED. OR THE OPPOSITE, BEING SO FIGETY OR RESTLESS THAT YOU HAVE BEEN MOVING AROUND A LOT MORE THAN USUAL: NOT AT ALL
2. FEELING DOWN, DEPRESSED OR HOPELESS: SEVERAL DAYS
4. FEELING TIRED OR HAVING LITTLE ENERGY: SEVERAL DAYS
5. POOR APPETITE OR OVEREATING: SEVERAL DAYS
6. FEELING BAD ABOUT YOURSELF - OR THAT YOU ARE A FAILURE OR HAVE LET YOURSELF OR YOUR FAMILY DOWN: NOT AT ALL
10. IF YOU CHECKED OFF ANY PROBLEMS, HOW DIFFICULT HAVE THESE PROBLEMS MADE IT FOR YOU TO DO YOUR WORK, TAKE CARE OF THINGS AT HOME, OR GET ALONG WITH OTHER PEOPLE: SOMEWHAT DIFFICULT
1. LITTLE INTEREST OR PLEASURE IN DOING THINGS: MORE THAN HALF THE DAYS
9. THOUGHTS THAT YOU WOULD BE BETTER OFF DEAD, OR OF HURTING YOURSELF: NOT AT ALL
3. TROUBLE FALLING OR STAYING ASLEEP: NOT AT ALL
7. TROUBLE CONCENTRATING ON THINGS, SUCH AS READING THE NEWSPAPER OR WATCHING TELEVISION: SEVERAL DAYS
SUM OF ALL RESPONSES TO PHQ QUESTIONS 1-9: 6

## 2023-06-05 NOTE — PROGRESS NOTES
Collaborative Care (CoCM)  Progress Note    Type of Interaction: In Office    Start Time: 1:10 PM    End Time: 2:00 PM    Appointment: Scheduled    Reason for Visit:   Chief Complaint   Patient presents with    Follow-up        Interval History / Patient Symptoms:     Patient Health Questionnaire-9 Score: 6 (6/5/2023  3:24 PM)  ROJELIO-7 Total Score: 5 (6/5/2023  3:24 PM)        Interventions Provided: Behavioral Activation and Motivational Interviewing    Progress Made: Minimum/Moderate    Response to Intervention: Patient describes feeling lonely currently.  Explored patient's current functioning, what has been going well and areas she is currently struggling with in her life.  Patient explored positive events that are happening/upcoming.  Also discussed how her current thought process of waiting to hear about her medical tests is not helping with her ability to be in the present.  Patient acknowledges that she knows what to do, but implementing actions and thoughts is challenging.       Plan:     Behavior Activation:  Start puzzle  Clean dining room table  Make hair appt.  Make medicare wellness visit    Continue to monitor med effectiveness; patient is unsure if the current dose/regimen is working      Follow Up / Next Appointment: Next appointment: 06/20/23

## 2023-06-08 ENCOUNTER — DOCUMENTATION (OUTPATIENT)
Dept: PRIMARY CARE | Facility: CLINIC | Age: 83
End: 2023-06-08
Payer: MEDICARE

## 2023-06-08 DIAGNOSIS — F32.4 MAJOR DEPRESSIVE DISORDER IN PARTIAL REMISSION, UNSPECIFIED WHETHER RECURRENT (CMS-HCC): Primary | ICD-10-CM

## 2023-06-08 PROCEDURE — 99494 1ST/SBSQ PSYC COLLAB CARE: CPT | Performed by: FAMILY MEDICINE

## 2023-06-08 PROCEDURE — 99493 SBSQ PSYC COLLAB CARE MGMT: CPT | Performed by: FAMILY MEDICINE

## 2023-06-12 ENCOUNTER — LAB (OUTPATIENT)
Dept: LAB | Facility: LAB | Age: 83
End: 2023-06-12
Payer: MEDICARE

## 2023-06-12 DIAGNOSIS — D72.829 LEUKOCYTOSIS, UNSPECIFIED TYPE: ICD-10-CM

## 2023-06-12 DIAGNOSIS — E03.9 ADULT HYPOTHYROIDISM: ICD-10-CM

## 2023-06-12 LAB
ALANINE AMINOTRANSFERASE (SGPT) (U/L) IN SER/PLAS: 27 U/L (ref 7–45)
ALBUMIN (G/DL) IN SER/PLAS: 4.6 G/DL (ref 3.4–5)
ALKALINE PHOSPHATASE (U/L) IN SER/PLAS: 65 U/L (ref 33–136)
ANION GAP IN SER/PLAS: 13 MMOL/L (ref 10–20)
ASPARTATE AMINOTRANSFERASE (SGOT) (U/L) IN SER/PLAS: 23 U/L (ref 9–39)
BASOPHILS (10*3/UL) IN BLOOD BY MANUAL COUNT - WAM: 0.12 X10E9/L (ref 0–0.1)
BASOPHILS/100 LEUKOCYTES IN BLOOD BY MANUAL COUNT - WAM: 0.9 % (ref 0–2)
BILIRUBIN TOTAL (MG/DL) IN SER/PLAS: 0.5 MG/DL (ref 0–1.2)
CALCIUM (MG/DL) IN SER/PLAS: 9.9 MG/DL (ref 8.6–10.6)
CARBON DIOXIDE, TOTAL (MMOL/L) IN SER/PLAS: 27 MMOL/L (ref 21–32)
CHLORIDE (MMOL/L) IN SER/PLAS: 105 MMOL/L (ref 98–107)
CREATININE (MG/DL) IN SER/PLAS: 0.75 MG/DL (ref 0.5–1.05)
EOSINOPHILS (10*3/UL) IN BLOOD BY MANUAL COUNT - WAM: 0.23 X10E9/L (ref 0–0.4)
EOSINOPHILS/100 LEUKOCYTES IN BLOOD BY MANUAL COUNT - WAM: 1.7 % (ref 0–6)
ERYTHROCYTE DISTRIBUTION WIDTH (RATIO) BY AUTOMATED COUNT: 14 % (ref 11.5–14.5)
ERYTHROCYTE MEAN CORPUSCULAR HEMOGLOBIN CONCENTRATION (G/DL) BY AUTOMATED: 31.3 G/DL (ref 32–36)
ERYTHROCYTE MEAN CORPUSCULAR VOLUME (FL) BY AUTOMATED COUNT: 95 FL (ref 80–100)
ERYTHROCYTES (10*6/UL) IN BLOOD BY AUTOMATED COUNT: 4.16 X10E12/L (ref 4–5.2)
GFR FEMALE: 78 ML/MIN/1.73M2
GLUCOSE (MG/DL) IN SER/PLAS: 108 MG/DL (ref 74–99)
HEMATOCRIT (%) IN BLOOD BY AUTOMATED COUNT: 39.6 % (ref 36–46)
HEMOGLOBIN (G/DL) IN BLOOD: 12.4 G/DL (ref 12–16)
IMMATURE GRANULOCYTES/100 LEUKOCYTES IN BLOOD BY AUTOMATED COUNT: 0.2 % (ref 0–0.9)
LEUKOCYTES (10*3/UL) IN BLOOD BY AUTOMATED COUNT: 13.6 X10E9/L (ref 4.4–11.3)
LYMPHOCYTES (10*3/UL) IN BLOOD BY MANUAL COUNT - WAM: 7.26 X10E9/L (ref 0.8–3)
LYMPHOCYTES VARIANT/100 LEUKOCYTES IN BLOOD - WAM: 6.9 % (ref 0–2)
LYMPHOCYTES/100 LEUKOCYTES IN BLOOD BY MANUAL COUNT - WAM: 53.4 % (ref 13–44)
MANUAL DIFFERENTIAL Y/N: ABNORMAL
MONOCYTES (10*3/UL) IN BLOOD BY MANUAL COUNT - WAM: 0.58 X10E9/L (ref 0.05–0.8)
MONOCYTES/100 LEUKOCYTES IN BLOOD BY MANUAL COUNT - WAM: 4.3 % (ref 2–10)
NEUTROPHILS (SEGS+BANDS) (10*3/UL) MANUAL COUNT - WAM: 4.46 X10E9/L (ref 1.6–5.5)
NRBC (PER 100 WBCS) BY AUTOMATED COUNT: 0 /100 WBC (ref 0–0)
PLATELETS (10*3/UL) IN BLOOD AUTOMATED COUNT: 183 X10E9/L (ref 150–450)
PLATELETS GIANT PRESENCE IN BLOOD BY LIGHT MICROSCOPY: 2.6
POTASSIUM (MMOL/L) IN SER/PLAS: 4.5 MMOL/L (ref 3.5–5.3)
PROTEIN TOTAL: 6.3 G/DL (ref 6.4–8.2)
RBC MORPHOLOGY IN BLOOD: NORMAL
SEGMENTED NEUTROPHILS (10*3/UL) BLOOD MANUAL - WAM: 4.46 X10E9/L (ref 1.6–5)
SEGMENTED NEUTROPHILS/100 LEUKOCYTES BY MANUAL COUNT -: 32.8 % (ref 40–80)
SODIUM (MMOL/L) IN SER/PLAS: 140 MMOL/L (ref 136–145)
THYROTROPIN (MIU/L) IN SER/PLAS BY DETECTION LIMIT <= 0.05 MIU/L: 3.97 MIU/L (ref 0.44–3.98)
UREA NITROGEN (MG/DL) IN SER/PLAS: 16 MG/DL (ref 6–23)
VARIANT LYMPHOCYTES (10*3/UL) BLOOD MANUAL COUNT - WAM: 0.94 X10E9/L (ref 0–0.3)

## 2023-06-12 PROCEDURE — 85025 COMPLETE CBC W/AUTO DIFF WBC: CPT

## 2023-06-12 PROCEDURE — 80053 COMPREHEN METABOLIC PANEL: CPT

## 2023-06-12 PROCEDURE — 84443 ASSAY THYROID STIM HORMONE: CPT

## 2023-06-12 PROCEDURE — 36415 COLL VENOUS BLD VENIPUNCTURE: CPT

## 2023-06-14 ENCOUNTER — TELEPHONE (OUTPATIENT)
Dept: PRIMARY CARE | Facility: CLINIC | Age: 83
End: 2023-06-14
Payer: MEDICARE

## 2023-06-14 DIAGNOSIS — K21.9 GASTRO-ESOPHAGEAL REFLUX DISEASE WITHOUT ESOPHAGITIS: ICD-10-CM

## 2023-06-15 ENCOUNTER — TELEPHONE (OUTPATIENT)
Dept: PRIMARY CARE | Facility: CLINIC | Age: 83
End: 2023-06-15
Payer: MEDICARE

## 2023-06-15 DIAGNOSIS — F32.4 MAJOR DEPRESSIVE DISORDER IN PARTIAL REMISSION, UNSPECIFIED WHETHER RECURRENT (CMS-HCC): Primary | ICD-10-CM

## 2023-06-15 NOTE — PROGRESS NOTES
Collaborative Care (CoCM)  Progress Note    Type of Interaction: Phone    Start Time: 10:10 AM      Appointment: Not Scheduled  Called and spoke with Celena; she called yesterday and cancelled her appointment for next Tuesday, 6/20 due to being overwhelmed with stressors of other dr appointments.  M will contact her on 6/26/23 to reschedule cancelled appointment.

## 2023-06-19 DIAGNOSIS — K21.9 GASTROESOPHAGEAL REFLUX DISEASE WITHOUT ESOPHAGITIS: Primary | ICD-10-CM

## 2023-06-19 RX ORDER — FAMOTIDINE 40 MG/1
40 TABLET, FILM COATED ORAL 2 TIMES DAILY
Qty: 60 TABLET | Refills: 5 | Status: SHIPPED | OUTPATIENT
Start: 2023-06-19 | End: 2023-12-27

## 2023-06-20 ENCOUNTER — APPOINTMENT (OUTPATIENT)
Dept: PRIMARY CARE | Facility: CLINIC | Age: 83
End: 2023-06-20
Payer: MEDICARE

## 2023-07-01 DIAGNOSIS — K21.9 GASTRO-ESOPHAGEAL REFLUX DISEASE WITHOUT ESOPHAGITIS: ICD-10-CM

## 2023-07-01 DIAGNOSIS — F41.9 ANXIETY DISORDER, UNSPECIFIED: ICD-10-CM

## 2023-07-03 ENCOUNTER — DOCUMENTATION (OUTPATIENT)
Dept: PRIMARY CARE | Facility: CLINIC | Age: 83
End: 2023-07-03
Payer: MEDICARE

## 2023-07-03 DIAGNOSIS — F32.4 MAJOR DEPRESSIVE DISORDER IN PARTIAL REMISSION, UNSPECIFIED WHETHER RECURRENT (CMS-HCC): Primary | ICD-10-CM

## 2023-07-03 PROCEDURE — 99493 SBSQ PSYC COLLAB CARE MGMT: CPT | Performed by: FAMILY MEDICINE

## 2023-07-05 ENCOUNTER — SOCIAL WORK (OUTPATIENT)
Dept: PRIMARY CARE | Facility: CLINIC | Age: 83
End: 2023-07-05
Payer: MEDICARE

## 2023-07-05 DIAGNOSIS — F32.4 MAJOR DEPRESSIVE DISORDER IN PARTIAL REMISSION, UNSPECIFIED WHETHER RECURRENT (CMS-HCC): Primary | ICD-10-CM

## 2023-07-05 ASSESSMENT — ANXIETY QUESTIONNAIRES
6. BECOMING EASILY ANNOYED OR IRRITABLE: NOT AT ALL
7. FEELING AFRAID AS IF SOMETHING AWFUL MIGHT HAPPEN: NOT AT ALL
4. TROUBLE RELAXING: NOT AT ALL
3. WORRYING TOO MUCH ABOUT DIFFERENT THINGS: SEVERAL DAYS
2. NOT BEING ABLE TO STOP OR CONTROL WORRYING: SEVERAL DAYS
GAD7 TOTAL SCORE: 4
IF YOU CHECKED OFF ANY PROBLEMS ON THIS QUESTIONNAIRE, HOW DIFFICULT HAVE THESE PROBLEMS MADE IT FOR YOU TO DO YOUR WORK, TAKE CARE OF THINGS AT HOME, OR GET ALONG WITH OTHER PEOPLE: SOMEWHAT DIFFICULT
5. BEING SO RESTLESS THAT IT IS HARD TO SIT STILL: SEVERAL DAYS
1. FEELING NERVOUS, ANXIOUS, OR ON EDGE: SEVERAL DAYS

## 2023-07-05 ASSESSMENT — PATIENT HEALTH QUESTIONNAIRE - PHQ9
4. FEELING TIRED OR HAVING LITTLE ENERGY: SEVERAL DAYS
SUM OF ALL RESPONSES TO PHQ QUESTIONS 1-9: 7
10. IF YOU CHECKED OFF ANY PROBLEMS, HOW DIFFICULT HAVE THESE PROBLEMS MADE IT FOR YOU TO DO YOUR WORK, TAKE CARE OF THINGS AT HOME, OR GET ALONG WITH OTHER PEOPLE: SOMEWHAT DIFFICULT
SUM OF ALL RESPONSES TO PHQ9 QUESTIONS 1 & 2: 3
9. THOUGHTS THAT YOU WOULD BE BETTER OFF DEAD, OR OF HURTING YOURSELF: NOT AT ALL
8. MOVING OR SPEAKING SO SLOWLY THAT OTHER PEOPLE COULD HAVE NOTICED. OR THE OPPOSITE, BEING SO FIGETY OR RESTLESS THAT YOU HAVE BEEN MOVING AROUND A LOT MORE THAN USUAL: NOT AT ALL
2. FEELING DOWN, DEPRESSED OR HOPELESS: SEVERAL DAYS
1. LITTLE INTEREST OR PLEASURE IN DOING THINGS: MORE THAN HALF THE DAYS
3. TROUBLE FALLING OR STAYING ASLEEP: NOT AT ALL
6. FEELING BAD ABOUT YOURSELF - OR THAT YOU ARE A FAILURE OR HAVE LET YOURSELF OR YOUR FAMILY DOWN: SEVERAL DAYS
5. POOR APPETITE OR OVEREATING: SEVERAL DAYS
7. TROUBLE CONCENTRATING ON THINGS, SUCH AS READING THE NEWSPAPER OR WATCHING TELEVISION: SEVERAL DAYS

## 2023-07-05 NOTE — PROGRESS NOTES
Collaborative Care (CoCM)  Progress Note    Type of Interaction: In Office    Start Time: 11:13 AM    End Time: 12:13 PM    Appointment: Scheduled    Reason for Visit:   Chief Complaint   Patient presents with    Follow-up    Depression      Interval History / Patient Symptoms:     Patient Health Questionnaire-9 Score: 7 (7/5/2023  2:00 PM)  ROJELIO-7 Total Score: 4 (7/5/2023  2:00 PM)      Joined [employer] through [employer]  Puzzle- started to complete  Made wellness visit    Has been with her friend- took to get nails and hair done.  Sometimes feels that she has over-extended herself with taking friend to appointments, but feels good after    Interventions Provided: Acceptance & Commitment Therapy      Progress Made: Minimum    Response to Intervention: Discussed ambiguous loss/grief and accepting where she is at this point in her life.  Explored Acceptance and Commitment Therapy, what it means and how it relates to patient.  Patient was able to understand that looking too far back or too far forward can lead to depression and anxiety.  Example, patient shared that she still holds grudge for  not being truthful about their financial state prior to his death.  Patient also discussed that she struggles with being able to live in the moment, as she often will state that she is going to wait til she gets the results of a medical test/procedure before making any plans.    Patient is willing to further explore ACT in upcoming appointments.          Plan:     Try to catch self when having thought blocking, and become more aware of these thoughts.      Follow Up / Next Appointment: Next appointment: 07/25/23

## 2023-07-10 ENCOUNTER — APPOINTMENT (OUTPATIENT)
Dept: PRIMARY CARE | Facility: CLINIC | Age: 83
End: 2023-07-10
Payer: MEDICARE

## 2023-07-10 RX ORDER — BUPROPION HYDROCHLORIDE 150 MG/1
TABLET ORAL
Qty: 90 TABLET | Refills: 1 | Status: SHIPPED | OUTPATIENT
Start: 2023-07-10 | End: 2024-03-21

## 2023-07-10 RX ORDER — OMEPRAZOLE 40 MG/1
CAPSULE, DELAYED RELEASE ORAL
Qty: 90 CAPSULE | Refills: 1 | Status: SHIPPED | OUTPATIENT
Start: 2023-07-10 | End: 2024-01-23 | Stop reason: SDUPTHER

## 2023-07-10 RX ORDER — FAMOTIDINE 40 MG/1
TABLET, FILM COATED ORAL
Qty: 180 TABLET | Refills: 1 | Status: SHIPPED | OUTPATIENT
Start: 2023-07-10 | End: 2023-10-30 | Stop reason: SDUPTHER

## 2023-07-20 ENCOUNTER — LAB (OUTPATIENT)
Dept: LAB | Facility: LAB | Age: 83
End: 2023-07-20
Payer: MEDICARE

## 2023-07-20 ENCOUNTER — OFFICE VISIT (OUTPATIENT)
Dept: PRIMARY CARE | Facility: CLINIC | Age: 83
End: 2023-07-20
Payer: MEDICARE

## 2023-07-20 VITALS
DIASTOLIC BLOOD PRESSURE: 80 MMHG | HEIGHT: 64 IN | WEIGHT: 190.8 LBS | BODY MASS INDEX: 32.58 KG/M2 | SYSTOLIC BLOOD PRESSURE: 120 MMHG | OXYGEN SATURATION: 98 % | HEART RATE: 75 BPM

## 2023-07-20 DIAGNOSIS — T14.8XXA BRUISING: ICD-10-CM

## 2023-07-20 DIAGNOSIS — R73.09 ELEVATED GLUCOSE: ICD-10-CM

## 2023-07-20 DIAGNOSIS — E66.09 CLASS 1 OBESITY DUE TO EXCESS CALORIES WITH BODY MASS INDEX (BMI) OF 32.0 TO 32.9 IN ADULT, UNSPECIFIED WHETHER SERIOUS COMORBIDITY PRESENT: ICD-10-CM

## 2023-07-20 DIAGNOSIS — E78.2 MIXED HYPERLIPIDEMIA: ICD-10-CM

## 2023-07-20 DIAGNOSIS — E03.9 ADULT HYPOTHYROIDISM: ICD-10-CM

## 2023-07-20 DIAGNOSIS — Z00.00 MEDICARE ANNUAL WELLNESS VISIT, SUBSEQUENT: Primary | ICD-10-CM

## 2023-07-20 LAB
ALANINE AMINOTRANSFERASE (SGPT) (U/L) IN SER/PLAS: 34 U/L (ref 7–45)
ALBUMIN (G/DL) IN SER/PLAS: 5 G/DL (ref 3.4–5)
ALKALINE PHOSPHATASE (U/L) IN SER/PLAS: 67 U/L (ref 33–136)
ANION GAP IN SER/PLAS: 18 MMOL/L (ref 10–20)
ASPARTATE AMINOTRANSFERASE (SGOT) (U/L) IN SER/PLAS: 31 U/L (ref 9–39)
BILIRUBIN TOTAL (MG/DL) IN SER/PLAS: 0.6 MG/DL (ref 0–1.2)
CALCIUM (MG/DL) IN SER/PLAS: 9.9 MG/DL (ref 8.6–10.6)
CARBON DIOXIDE, TOTAL (MMOL/L) IN SER/PLAS: 22 MMOL/L (ref 21–32)
CHLORIDE (MMOL/L) IN SER/PLAS: 105 MMOL/L (ref 98–107)
CHOLESTEROL (MG/DL) IN SER/PLAS: 203 MG/DL (ref 0–199)
CHOLESTEROL IN HDL (MG/DL) IN SER/PLAS: 63.9 MG/DL
CHOLESTEROL/HDL RATIO: 3.2
CREATININE (MG/DL) IN SER/PLAS: 0.74 MG/DL (ref 0.5–1.05)
ESTIMATED AVERAGE GLUCOSE FOR HBA1C: 117 MG/DL
GFR FEMALE: 80 ML/MIN/1.73M2
GLUCOSE (MG/DL) IN SER/PLAS: 107 MG/DL (ref 74–99)
HEMOGLOBIN A1C/HEMOGLOBIN TOTAL IN BLOOD: 5.7 %
INR IN PPP BY COAGULATION ASSAY: 1 (ref 0.9–1.1)
LDL: 94 MG/DL (ref 0–99)
NON HDL CHOLESTEROL: 139 MG/DL
POTASSIUM (MMOL/L) IN SER/PLAS: 4.2 MMOL/L (ref 3.5–5.3)
PROTEIN TOTAL: 6.7 G/DL (ref 6.4–8.2)
PROTHROMBIN TIME (PT) IN PPP BY COAGULATION ASSAY: 11.1 SEC (ref 9.8–12.8)
SODIUM (MMOL/L) IN SER/PLAS: 141 MMOL/L (ref 136–145)
TRIGLYCERIDE (MG/DL) IN SER/PLAS: 227 MG/DL (ref 0–149)
UREA NITROGEN (MG/DL) IN SER/PLAS: 14 MG/DL (ref 6–23)
VLDL: 45 MG/DL (ref 0–40)

## 2023-07-20 PROCEDURE — 80061 LIPID PANEL: CPT

## 2023-07-20 PROCEDURE — 1036F TOBACCO NON-USER: CPT | Performed by: FAMILY MEDICINE

## 2023-07-20 PROCEDURE — 83036 HEMOGLOBIN GLYCOSYLATED A1C: CPT

## 2023-07-20 PROCEDURE — 80053 COMPREHEN METABOLIC PANEL: CPT

## 2023-07-20 PROCEDURE — 1170F FXNL STATUS ASSESSED: CPT | Performed by: FAMILY MEDICINE

## 2023-07-20 PROCEDURE — 1159F MED LIST DOCD IN RCRD: CPT | Performed by: FAMILY MEDICINE

## 2023-07-20 PROCEDURE — 1160F RVW MEDS BY RX/DR IN RCRD: CPT | Performed by: FAMILY MEDICINE

## 2023-07-20 PROCEDURE — 99213 OFFICE O/P EST LOW 20 MIN: CPT | Performed by: FAMILY MEDICINE

## 2023-07-20 PROCEDURE — 36415 COLL VENOUS BLD VENIPUNCTURE: CPT

## 2023-07-20 PROCEDURE — 1157F ADVNC CARE PLAN IN RCRD: CPT | Performed by: FAMILY MEDICINE

## 2023-07-20 PROCEDURE — 85610 PROTHROMBIN TIME: CPT

## 2023-07-20 PROCEDURE — G0439 PPPS, SUBSEQ VISIT: HCPCS | Performed by: FAMILY MEDICINE

## 2023-07-20 RX ORDER — LEVOTHYROXINE SODIUM 25 UG/1
25 TABLET ORAL DAILY
Qty: 30 TABLET | Refills: 0
Start: 2023-07-20 | End: 2023-11-16

## 2023-07-20 RX ORDER — CITALOPRAM 20 MG/1
20 TABLET, FILM COATED ORAL DAILY
Qty: 90 TABLET | Refills: 1 | Status: SHIPPED
Start: 2023-07-20 | End: 2023-10-12

## 2023-07-20 ASSESSMENT — ENCOUNTER SYMPTOMS
OCCASIONAL FEELINGS OF UNSTEADINESS: 0
DEPRESSION: 0
LOSS OF SENSATION IN FEET: 0

## 2023-07-20 ASSESSMENT — ACTIVITIES OF DAILY LIVING (ADL)
DRESSING: INDEPENDENT
DOING_HOUSEWORK: INDEPENDENT
MANAGING_FINANCES: INDEPENDENT
BATHING: INDEPENDENT
TAKING_MEDICATION: INDEPENDENT
DRESSING: INDEPENDENT
GROCERY_SHOPPING: INDEPENDENT

## 2023-07-20 ASSESSMENT — ANXIETY QUESTIONNAIRES
6. BECOMING EASILY ANNOYED OR IRRITABLE: NEARLY EVERY DAY
GAD7 TOTAL SCORE: 14
2. NOT BEING ABLE TO STOP OR CONTROL WORRYING: MORE THAN HALF THE DAYS
5. BEING SO RESTLESS THAT IT IS HARD TO SIT STILL: MORE THAN HALF THE DAYS
7. FEELING AFRAID AS IF SOMETHING AWFUL MIGHT HAPPEN: NOT AT ALL
4. TROUBLE RELAXING: NEARLY EVERY DAY
3. WORRYING TOO MUCH ABOUT DIFFERENT THINGS: SEVERAL DAYS
IF YOU CHECKED OFF ANY PROBLEMS ON THIS QUESTIONNAIRE, HOW DIFFICULT HAVE THESE PROBLEMS MADE IT FOR YOU TO DO YOUR WORK, TAKE CARE OF THINGS AT HOME, OR GET ALONG WITH OTHER PEOPLE: SOMEWHAT DIFFICULT
1. FEELING NERVOUS, ANXIOUS, OR ON EDGE: NEARLY EVERY DAY

## 2023-07-20 ASSESSMENT — PATIENT HEALTH QUESTIONNAIRE - PHQ9
SUM OF ALL RESPONSES TO PHQ9 QUESTIONS 1 AND 2: 3
1. LITTLE INTEREST OR PLEASURE IN DOING THINGS: SEVERAL DAYS
7. TROUBLE CONCENTRATING ON THINGS, SUCH AS READING THE NEWSPAPER OR WATCHING TELEVISION: MORE THAN HALF THE DAYS
6. FEELING BAD ABOUT YOURSELF - OR THAT YOU ARE A FAILURE OR HAVE LET YOURSELF OR YOUR FAMILY DOWN: NEARLY EVERY DAY
3. TROUBLE FALLING OR STAYING ASLEEP OR SLEEPING TOO MUCH: SEVERAL DAYS
2. FEELING DOWN, DEPRESSED OR HOPELESS: MORE THAN HALF THE DAYS
4. FEELING TIRED OR HAVING LITTLE ENERGY: MORE THAN HALF THE DAYS
10. IF YOU CHECKED OFF ANY PROBLEMS, HOW DIFFICULT HAVE THESE PROBLEMS MADE IT FOR YOU TO DO YOUR WORK, TAKE CARE OF THINGS AT HOME, OR GET ALONG WITH OTHER PEOPLE: SOMEWHAT DIFFICULT
9. THOUGHTS THAT YOU WOULD BE BETTER OFF DEAD, OR OF HURTING YOURSELF: MORE THAN HALF THE DAYS
8. MOVING OR SPEAKING SO SLOWLY THAT OTHER PEOPLE COULD HAVE NOTICED. OR THE OPPOSITE, BEING SO FIGETY OR RESTLESS THAT YOU HAVE BEEN MOVING AROUND A LOT MORE THAN USUAL: SEVERAL DAYS
5. POOR APPETITE OR OVEREATING: MORE THAN HALF THE DAYS
SUM OF ALL RESPONSES TO PHQ QUESTIONS 1-9: 16

## 2023-07-20 NOTE — PATIENT INSTRUCTIONS
HYPOTHYROID: RESTART LEVOTHYROXINE 25 mcgs  Repeat labs in 8 - 12 weeks    ANXIETY & FATIGUE: consider decreasing Citalopram    Checking fasting labs and will call with results.    FOLLOW UP 3-6 MONTHS.

## 2023-07-20 NOTE — PROGRESS NOTES
Subjective   Reason for Visit: Diana Azul is an 83 y.o. female here for a Medicare Wellness visit.     Past Medical, Surgical, and Family History reviewed and updated in chart.    Reviewed all medications by prescribing practitioner or clinical pharmacist (such as prescriptions, OTCs, herbal therapies and supplements) and documented in the medical record.    HYPOTHYROID: taking otc supplements instead of Levothyroxine as she was concerned since her daughter could not tolerate this medication.    ANXIETY & DEPRESSION: meeting regularly with Chrissy for behavioral health collaborative care  Tolerating Wellbutrin and Celexa - unclear if it is helping as much  She is concerned with weight gain over the last 2 years when we restarted this    Feels like her daughter sometimes worries too much about her  She does speak with he daughter daily    She has joined senior group at her [facility] in [place].  Making an effort to attend group outings and luncheons.    PHQ-9: 16  ROJELIO-7: 14    BRUISING: increased brusing without trauma  No changes in diet, only new supplement is the thyroid supplement      HPI    Patient Care Team:  Marilee Yao DO as PCP - General  Marilee Yao DO as PCP - [employer]vantage PCP     SOC Hx:    Tobacco Use: Low Risk  (7/22/2023)    Patient History     Smoking Tobacco Use: Never     Smokeless Tobacco Use: Never     Passive Exposure: Not on file     Alcohol Use: Not on file       DIET: general    EXERCISE: The patient does not participate in regular exercise at present.    ELIMINATION: no constipation or diarrhea    No urinary issuesnone    SLEEP: no issues minimally disturbed    MOODS:   PHQ-9: Patient Health Questionnaire-9 Score: 16  Patient Health Questionnaire-9 Score: 16     ROJELIO-7: ROJELIO-7 Total Score: 14     OB/GYN: LMP: No LMP recorded. Patient is postmenopausal.  Menopausal     Review of Systems    Objective   Vitals:  /80 (BP Location: Left arm, Patient  "Position: Sitting, BP Cuff Size: Adult)   Pulse 75   Ht 1.626 m (5' 4\")   Wt 86.5 kg (190 lb 12.8 oz)   SpO2 98%   BMI 32.75 kg/m²       Physical Exam  Constitutional:       General: She is not in acute distress.     Appearance: Normal appearance. She is not ill-appearing.   HENT:      Head: Normocephalic and atraumatic.   Neck:      Vascular: No carotid bruit.   Cardiovascular:      Rate and Rhythm: Normal rate and regular rhythm.      Pulses: Normal pulses.      Heart sounds: Normal heart sounds. No murmur heard.     No gallop.   Pulmonary:      Effort: Pulmonary effort is normal.      Breath sounds: Normal breath sounds. No wheezing, rhonchi or rales.   Musculoskeletal:      Cervical back: Normal range of motion and neck supple. No rigidity or tenderness.   Lymphadenopathy:      Cervical: No cervical adenopathy.   Skin:     General: Skin is warm and dry.   Neurological:      Mental Status: She is alert.   Psychiatric:         Mood and Affect: Mood normal.         Behavior: Behavior normal.         Assessment/Plan   Problem List Items Addressed This Visit       Elevated glucose    Relevant Orders    Hemoglobin A1C (Completed)    Hyperlipidemia    Relevant Orders    Lipid Panel (Completed)    Comprehensive metabolic panel (Completed)    Adult hypothyroidism    Relevant Medications    levothyroxine (Synthroid, Levoxyl) 25 mcg tablet     Other Visit Diagnoses       Medicare annual wellness visit, subsequent    -  Primary    Class 1 obesity due to excess calories with body mass index (BMI) of 32.0 to 32.9 in adult, unspecified whether serious comorbidity present        Bruising        Relevant Orders    Protime-INR (Completed)          Medicare Wellness Billing Compliance Satisfied    *This is a visual tool to show completion of required items on the day of the visit. Green checks will only appear on the date of visit.    Review all medications by prescribing practitioner or clinical pharmacist (such as " prescriptions, OTCs, herbal therapies and supplements) documented in the medical record    Past Medical, Surgical, and Family History reviewed and updated in chart    Tobacco Use Reviewed    Alcohol Use Reviewed    Illicit Drug Use Reviewed    PHQ2/9    Falls in Last Year Reviewed    Home Safety Risk Factors Reviewed    Cognitive Impairment Reviewed    Patient Self Assessment and Health Status    Current Diet Reviewed    Exercise Frequency    ADL - Hearing Impairment    ADL - Bathing    ADL - Dressing    ADL - Walks in Home    IADL - Managing Finances    IADL - Grocery Shopping    IADL - Taking Medications    IADL - Doing Housework      FOLLOW UP 3-4 MONTHS PENDING REVIEW OF LABS.  RECHECK TSH IN 8 WEEKS.  FOLLOW UP WITH JACE as DIRECTED.

## 2023-07-22 ASSESSMENT — PATIENT HEALTH QUESTIONNAIRE - PHQ9
6. FEELING BAD ABOUT YOURSELF - OR THAT YOU ARE A FAILURE OR HAVE LET YOURSELF OR YOUR FAMILY DOWN: NEARLY EVERY DAY
9. THOUGHTS THAT YOU WOULD BE BETTER OFF DEAD, OR OF HURTING YOURSELF: MORE THAN HALF THE DAYS
5. POOR APPETITE OR OVEREATING: MORE THAN HALF THE DAYS
SUM OF ALL RESPONSES TO PHQ9 QUESTIONS 1 AND 2: 3
SUM OF ALL RESPONSES TO PHQ QUESTIONS 1-9: 16
4. FEELING TIRED OR HAVING LITTLE ENERGY: MORE THAN HALF THE DAYS
1. LITTLE INTEREST OR PLEASURE IN DOING THINGS: SEVERAL DAYS
3. TROUBLE FALLING OR STAYING ASLEEP OR SLEEPING TOO MUCH: SEVERAL DAYS
8. MOVING OR SPEAKING SO SLOWLY THAT OTHER PEOPLE COULD HAVE NOTICED. OR THE OPPOSITE, BEING SO FIGETY OR RESTLESS THAT YOU HAVE BEEN MOVING AROUND A LOT MORE THAN USUAL: SEVERAL DAYS
10. IF YOU CHECKED OFF ANY PROBLEMS, HOW DIFFICULT HAVE THESE PROBLEMS MADE IT FOR YOU TO DO YOUR WORK, TAKE CARE OF THINGS AT HOME, OR GET ALONG WITH OTHER PEOPLE: SOMEWHAT DIFFICULT
2. FEELING DOWN, DEPRESSED OR HOPELESS: MORE THAN HALF THE DAYS
7. TROUBLE CONCENTRATING ON THINGS, SUCH AS READING THE NEWSPAPER OR WATCHING TELEVISION: MORE THAN HALF THE DAYS

## 2023-07-22 ASSESSMENT — ACTIVITIES OF DAILY LIVING (ADL)
BATHING: INDEPENDENT
TAKING_MEDICATION: INDEPENDENT
MANAGING_FINANCES: INDEPENDENT
GROCERY_SHOPPING: INDEPENDENT
DOING_HOUSEWORK: INDEPENDENT
DRESSING: INDEPENDENT

## 2023-07-25 ENCOUNTER — SOCIAL WORK (OUTPATIENT)
Dept: PRIMARY CARE | Facility: CLINIC | Age: 83
End: 2023-07-25
Payer: MEDICARE

## 2023-07-25 DIAGNOSIS — F32.4 MAJOR DEPRESSIVE DISORDER IN PARTIAL REMISSION, UNSPECIFIED WHETHER RECURRENT (CMS-HCC): Primary | ICD-10-CM

## 2023-07-25 LAB
ALANINE AMINOTRANSFERASE (SGPT) (U/L) IN SER/PLAS: 30 U/L (ref 7–45)
ALBUMIN (G/DL) IN SER/PLAS: 4.6 G/DL (ref 3.4–5)
ALKALINE PHOSPHATASE (U/L) IN SER/PLAS: 65 U/L (ref 33–136)
ANION GAP IN SER/PLAS: 16 MMOL/L (ref 10–20)
ASPARTATE AMINOTRANSFERASE (SGOT) (U/L) IN SER/PLAS: 26 U/L (ref 9–39)
BASOPHILS (10*3/UL) IN BLOOD BY AUTOMATED COUNT: 0.04 X10E9/L (ref 0–0.1)
BASOPHILS/100 LEUKOCYTES IN BLOOD BY AUTOMATED COUNT: 0.3 % (ref 0–2)
BILIRUBIN TOTAL (MG/DL) IN SER/PLAS: 0.5 MG/DL (ref 0–1.2)
CALCIUM (MG/DL) IN SER/PLAS: 9.7 MG/DL (ref 8.6–10.6)
CARBON DIOXIDE, TOTAL (MMOL/L) IN SER/PLAS: 24 MMOL/L (ref 21–32)
CHLORIDE (MMOL/L) IN SER/PLAS: 108 MMOL/L (ref 98–107)
CREATININE (MG/DL) IN SER/PLAS: 0.78 MG/DL (ref 0.5–1.05)
EOSINOPHILS (10*3/UL) IN BLOOD BY AUTOMATED COUNT: 0.14 X10E9/L (ref 0–0.4)
EOSINOPHILS/100 LEUKOCYTES IN BLOOD BY AUTOMATED COUNT: 0.9 % (ref 0–6)
ERYTHROCYTE DISTRIBUTION WIDTH (RATIO) BY AUTOMATED COUNT: 14.2 % (ref 11.5–14.5)
ERYTHROCYTE MEAN CORPUSCULAR HEMOGLOBIN CONCENTRATION (G/DL) BY AUTOMATED: 31.5 G/DL (ref 32–36)
ERYTHROCYTE MEAN CORPUSCULAR VOLUME (FL) BY AUTOMATED COUNT: 95 FL (ref 80–100)
ERYTHROCYTES (10*6/UL) IN BLOOD BY AUTOMATED COUNT: 3.99 X10E12/L (ref 4–5.2)
GFR FEMALE: 75 ML/MIN/1.73M2
GLUCOSE (MG/DL) IN SER/PLAS: 109 MG/DL (ref 74–99)
HEMATOCRIT (%) IN BLOOD BY AUTOMATED COUNT: 38.1 % (ref 36–46)
HEMOGLOBIN (G/DL) IN BLOOD: 12 G/DL (ref 12–16)
HEMOGLOBIN (PG) IN RETICULOCYTES: 35 PG (ref 28–38)
IMMATURE GRANULOCYTES/100 LEUKOCYTES IN BLOOD BY AUTOMATED COUNT: 0.5 % (ref 0–0.9)
IMMATURE RETIC FRACTION: 20 % (ref 0–16)
LEUKOCYTES (10*3/UL) IN BLOOD BY AUTOMATED COUNT: 15 X10E9/L (ref 4.4–11.3)
LYMPHOCYTES (10*3/UL) IN BLOOD BY AUTOMATED COUNT: 7.76 X10E9/L (ref 0.8–3)
LYMPHOCYTES/100 LEUKOCYTES IN BLOOD BY AUTOMATED COUNT: 51.9 % (ref 13–44)
MONOCYTES (10*3/UL) IN BLOOD BY AUTOMATED COUNT: 0.67 X10E9/L (ref 0.05–0.8)
MONOCYTES/100 LEUKOCYTES IN BLOOD BY AUTOMATED COUNT: 4.5 % (ref 2–10)
NEUTROPHILS (10*3/UL) IN BLOOD BY AUTOMATED COUNT: 6.28 X10E9/L (ref 1.6–5.5)
NEUTROPHILS/100 LEUKOCYTES IN BLOOD BY AUTOMATED COUNT: 41.9 % (ref 40–80)
NRBC (PER 100 WBCS) BY AUTOMATED COUNT: 0 /100 WBC (ref 0–0)
PLATELETS (10*3/UL) IN BLOOD AUTOMATED COUNT: 210 X10E9/L (ref 150–450)
POTASSIUM (MMOL/L) IN SER/PLAS: 4.1 MMOL/L (ref 3.5–5.3)
PROTEIN TOTAL: 6.4 G/DL (ref 6.4–8.2)
RETICULOCYTES (10*3/UL) IN BLOOD: 0.07 X10E12/L (ref 0.02–0.11)
RETICULOCYTES/100 ERYTHROCYTES IN BLOOD BY AUTOMATED COUNT: 1.8 % (ref 0.5–2)
SODIUM (MMOL/L) IN SER/PLAS: 144 MMOL/L (ref 136–145)
UREA NITROGEN (MG/DL) IN SER/PLAS: 16 MG/DL (ref 6–23)

## 2023-07-25 ASSESSMENT — PATIENT HEALTH QUESTIONNAIRE - PHQ9
9. THOUGHTS THAT YOU WOULD BE BETTER OFF DEAD, OR OF HURTING YOURSELF: NOT AT ALL
1. LITTLE INTEREST OR PLEASURE IN DOING THINGS: SEVERAL DAYS
6. FEELING BAD ABOUT YOURSELF - OR THAT YOU ARE A FAILURE OR HAVE LET YOURSELF OR YOUR FAMILY DOWN: SEVERAL DAYS
2. FEELING DOWN, DEPRESSED OR HOPELESS: SEVERAL DAYS
SUM OF ALL RESPONSES TO PHQ QUESTIONS 1-9: 4
7. TROUBLE CONCENTRATING ON THINGS, SUCH AS READING THE NEWSPAPER OR WATCHING TELEVISION: NOT AT ALL
8. MOVING OR SPEAKING SO SLOWLY THAT OTHER PEOPLE COULD HAVE NOTICED. OR THE OPPOSITE, BEING SO FIGETY OR RESTLESS THAT YOU HAVE BEEN MOVING AROUND A LOT MORE THAN USUAL: NOT AT ALL
3. TROUBLE FALLING OR STAYING ASLEEP: NOT AT ALL
10. IF YOU CHECKED OFF ANY PROBLEMS, HOW DIFFICULT HAVE THESE PROBLEMS MADE IT FOR YOU TO DO YOUR WORK, TAKE CARE OF THINGS AT HOME, OR GET ALONG WITH OTHER PEOPLE: SOMEWHAT DIFFICULT
5. POOR APPETITE OR OVEREATING: NOT AT ALL
SUM OF ALL RESPONSES TO PHQ9 QUESTIONS 1 & 2: 2
4. FEELING TIRED OR HAVING LITTLE ENERGY: SEVERAL DAYS

## 2023-07-25 ASSESSMENT — ANXIETY QUESTIONNAIRES
6. BECOMING EASILY ANNOYED OR IRRITABLE: NEARLY EVERY DAY
2. NOT BEING ABLE TO STOP OR CONTROL WORRYING: MORE THAN HALF THE DAYS
4. TROUBLE RELAXING: MORE THAN HALF THE DAYS
GAD7 TOTAL SCORE: 15
IF YOU CHECKED OFF ANY PROBLEMS ON THIS QUESTIONNAIRE, HOW DIFFICULT HAVE THESE PROBLEMS MADE IT FOR YOU TO DO YOUR WORK, TAKE CARE OF THINGS AT HOME, OR GET ALONG WITH OTHER PEOPLE: SOMEWHAT DIFFICULT
7. FEELING AFRAID AS IF SOMETHING AWFUL MIGHT HAPPEN: NOT AT ALL
3. WORRYING TOO MUCH ABOUT DIFFERENT THINGS: NEARLY EVERY DAY
5. BEING SO RESTLESS THAT IT IS HARD TO SIT STILL: MORE THAN HALF THE DAYS
1. FEELING NERVOUS, ANXIOUS, OR ON EDGE: NEARLY EVERY DAY

## 2023-07-25 NOTE — PROGRESS NOTES
Collaborative Care (CoCM)  Progress Note    Type of Interaction: In Office    Start Time: 11:08 AM    End Time: 11:56 AM        Appointment: Scheduled    Reason for Visit:   Chief Complaint   Patient presents with    Follow-up      Interval History / Patient Symptoms:     Patient Health Questionnaire-9 Score: 4 (7/25/2023 12:00 PM)  ROJELIO-7 Total Score: 15 (7/25/2023 12:00 PM)    Feels very jittery; unsure if it's psych med issue; [facility] to review with psychiatrist tomorrow  Recently went to PCP for wellness visit and started thyroid medication after being off for a while.    Reports sleeping well    Has been with family; family left yesterday    Interventions Provided: Acceptance & Commitment Therapy      Progress Made: N/A    Response to Intervention: Went over patient's current functioning- explored patient's management of anxiety/depression and supported patient's concerns and feelings of not being as connected to her grandchildren now that they have grown and have their own families.  Reported that she doesn't know if she said something to make them upset, as she reports she forgets what she says to others.  Discussed [facility] as a potential option to get a better idea of patient's current functioning with patient reporting forgetfulness.  Patient believes it may be due to stress    Patient believes she has had increase in anxiety due to living alone and having more time to think about things.  Also acknowledges that she also has not been following through with exercising and following through with healthy decision making choices and would like to eat better.      Plan:     [facility] to review psych meds with psychiatrist during consultation meeting and discuss with PCP to determine a plan    Patient to make positive/healthy food choices- eating whole foods, fruits and vegetables each day      Follow Up / Next Appointment: Next appointment: 08/01/23

## 2023-07-28 ENCOUNTER — DOCUMENTATION (OUTPATIENT)
Dept: BEHAVIORAL HEALTH | Facility: CLINIC | Age: 83
End: 2023-07-28
Payer: MEDICARE

## 2023-07-28 NOTE — PROGRESS NOTES
"[facility] Psychiatry Follow-Up Note     Diana Azul is a 83 y.o., referred to Collaborative Care for reconsideration of prior recommendations due to patient concern for recent onset \"jitteriness\". I have reviewed the patient with the behavioral health manager and reviewed the patient's electronic record. Patient was first discussed in collaborative care 1/23/23 due to depression and anxiety, at which time recommendation was made to increase her dose of burpopion and continue on citalopram 20mg daily.     Pertinent highlights of discussion and chart review include the following:   Patient reported recent concern for \"jitteriness\"; per review and discussion of interval  medical events, onset of concerns is chronologically associated with recent re-initiation of adherence to prescribed levothyroxine (7/20/23) following ~2mo period of self-discontinuation.   Highlights of initial Sx: self-isolation, weight gain, poor self-esteem, persistent negative bias; no insomnia.   PMH notable for hypothyroidism, obesity, IBS, low back pain, and physical deconditioning  Current psych medications: buproprion XL 150mg daily (since ~2020, partial response), propranolol 10mg daily PRN anxiety (since early 2023), citalopram 20mg.  Prior psych meds: alprazolam, sertraline      Patient Health Questionnaire-9 Score: 4 (7/25/2023 12:00 PM)  ROJELIO-7 Total Score: 15 (7/25/2023 12:00 PM)    Recommendations:   Behavioral health manager (BHM) to continue to engage with patient   No change to psychotropic medication management recommended at this time on the basis of patient concern for new-onset jitteriness - this is most likely associated with recent re-initiation of prescribed levothyroxine following period of nonadherence.   BHM to continue to engage with patient; recommend providing psychoeducation regarding anticipated impacts (including psychiatric manifestations) of variable adherence to prescribed levothyroxine.     The above treatment " considerations and suggestions are based on consultations with the patient's care manager and a review of information available in the electronic medical record. I have not personally examined the patient. All recommendations should be implemented with consideration of the patient's relevant prior history and current clinical status. Please feel free to call me with any questions about the care of this patient. I can easily be reached through [employer], or via email ([email or web address]).

## 2023-07-28 NOTE — Clinical Note
"FYI regarding my recommendations based on our team's discussion in collaborative care; in brief she asked Chrissy to check in regarding her new concern for \"jitteriness\" - on further discussion and evaluation this seems chronologically linked to her recent re-initiation of the levothyroxine, so I would suggest following clinically (and asked Chrissy to give her education on the effects of thyroid hormones).   Please feel free to reach out with any questions / comments / feedback / concerns.   Ranjan, Joaquim  "

## 2023-07-31 ENCOUNTER — DOCUMENTATION (OUTPATIENT)
Dept: PRIMARY CARE | Facility: CLINIC | Age: 83
End: 2023-07-31
Payer: MEDICARE

## 2023-07-31 DIAGNOSIS — F32.4 MAJOR DEPRESSIVE DISORDER IN PARTIAL REMISSION, UNSPECIFIED WHETHER RECURRENT (CMS-HCC): Primary | ICD-10-CM

## 2023-07-31 PROCEDURE — 99494 1ST/SBSQ PSYC COLLAB CARE: CPT | Performed by: FAMILY MEDICINE

## 2023-07-31 PROCEDURE — 99493 SBSQ PSYC COLLAB CARE MGMT: CPT | Performed by: FAMILY MEDICINE

## 2023-08-01 ENCOUNTER — SOCIAL WORK (OUTPATIENT)
Dept: PRIMARY CARE | Facility: CLINIC | Age: 83
End: 2023-08-01
Payer: MEDICARE

## 2023-08-01 DIAGNOSIS — F32.4 MAJOR DEPRESSIVE DISORDER IN PARTIAL REMISSION, UNSPECIFIED WHETHER RECURRENT (CMS-HCC): Primary | ICD-10-CM

## 2023-08-01 NOTE — PROGRESS NOTES
Collaborative Care (CoCM)  Progress Note    Type of Interaction: In Office    Start Time: 10:00 AM    End Time: 10:30 AM        Appointment: Scheduled    Reason for Visit:   Chief Complaint   Patient presents with    Follow-up          Interval History / Patient Symptoms:     Patient Health Questionnaire-9 Score: 4 (7/25/2023 12:00 PM)  ROJELIO-7 Total Score: 15 (7/25/2023 12:00 PM)        Interventions Provided:  Follow up from psychiatrist       Progress Made: N/A    Response to Intervention: Patient stated she has been feeling better; had family over the weekend.  Discussed psychiatrist recommendations of continuing to take thyroid medication and all other medications as prescribed.  Patient understands and will follow up with PCP if symptoms continue.        Plan:     Follow up in 2 weeks; continue to take medications as prescribed      Follow Up / Next Appointment: Next appointment: 08/15/23

## 2023-08-03 DIAGNOSIS — F41.9 ANXIETY DISORDER, UNSPECIFIED: ICD-10-CM

## 2023-08-15 ENCOUNTER — SOCIAL WORK (OUTPATIENT)
Dept: PRIMARY CARE | Facility: CLINIC | Age: 83
End: 2023-08-15
Payer: MEDICARE

## 2023-08-15 DIAGNOSIS — F32.4 MAJOR DEPRESSIVE DISORDER IN PARTIAL REMISSION, UNSPECIFIED WHETHER RECURRENT (CMS-HCC): Primary | ICD-10-CM

## 2023-08-15 ASSESSMENT — PATIENT HEALTH QUESTIONNAIRE - PHQ9
6. FEELING BAD ABOUT YOURSELF - OR THAT YOU ARE A FAILURE OR HAVE LET YOURSELF OR YOUR FAMILY DOWN: NOT AT ALL
5. POOR APPETITE OR OVEREATING: NOT AT ALL
9. THOUGHTS THAT YOU WOULD BE BETTER OFF DEAD, OR OF HURTING YOURSELF: NOT AT ALL
8. MOVING OR SPEAKING SO SLOWLY THAT OTHER PEOPLE COULD HAVE NOTICED. OR THE OPPOSITE, BEING SO FIGETY OR RESTLESS THAT YOU HAVE BEEN MOVING AROUND A LOT MORE THAN USUAL: NOT AT ALL
7. TROUBLE CONCENTRATING ON THINGS, SUCH AS READING THE NEWSPAPER OR WATCHING TELEVISION: SEVERAL DAYS
10. IF YOU CHECKED OFF ANY PROBLEMS, HOW DIFFICULT HAVE THESE PROBLEMS MADE IT FOR YOU TO DO YOUR WORK, TAKE CARE OF THINGS AT HOME, OR GET ALONG WITH OTHER PEOPLE: SOMEWHAT DIFFICULT
1. LITTLE INTEREST OR PLEASURE IN DOING THINGS: NOT AT ALL
3. TROUBLE FALLING OR STAYING ASLEEP: NOT AT ALL
SUM OF ALL RESPONSES TO PHQ9 QUESTIONS 1 & 2: 1
4. FEELING TIRED OR HAVING LITTLE ENERGY: NOT AT ALL
2. FEELING DOWN, DEPRESSED OR HOPELESS: SEVERAL DAYS
SUM OF ALL RESPONSES TO PHQ QUESTIONS 1-9: 2

## 2023-08-15 ASSESSMENT — ANXIETY QUESTIONNAIRES
7. FEELING AFRAID AS IF SOMETHING AWFUL MIGHT HAPPEN: NOT AT ALL
4. TROUBLE RELAXING: NOT AT ALL
2. NOT BEING ABLE TO STOP OR CONTROL WORRYING: NOT AT ALL
5. BEING SO RESTLESS THAT IT IS HARD TO SIT STILL: NOT AT ALL
1. FEELING NERVOUS, ANXIOUS, OR ON EDGE: SEVERAL DAYS
3. WORRYING TOO MUCH ABOUT DIFFERENT THINGS: SEVERAL DAYS
IF YOU CHECKED OFF ANY PROBLEMS ON THIS QUESTIONNAIRE, HOW DIFFICULT HAVE THESE PROBLEMS MADE IT FOR YOU TO DO YOUR WORK, TAKE CARE OF THINGS AT HOME, OR GET ALONG WITH OTHER PEOPLE: SOMEWHAT DIFFICULT
GAD7 TOTAL SCORE: 3
6. BECOMING EASILY ANNOYED OR IRRITABLE: SEVERAL DAYS

## 2023-08-15 NOTE — PROGRESS NOTES
Collaborative Care (CoCM)  Progress Note    Type of Interaction: In Office    Start Time: 11:05 AM    End Time: 11:55 AM    Appointment: Scheduled    Reason for Visit:   Chief Complaint   Patient presents with    Follow-up      Interval History / Patient Symptoms:     Patient Health Questionnaire-9 Score: 2 (8/15/2023 12:00 PM)  ROJELIO-7 Total Score: 3 (8/15/2023 12:00 PM)    Patient's PHQ went down and ROJELIO went down significantly    Interventions Provided: Psychoeducation and Motivational Interviewing      Progress Made: Moderate    Response to Intervention: Patient reported she forgot about this appointment until 10:00a, but was able to use positive thinking to get herself ready and to the appointment.  Patient states that she has been using more positive thinking lately, and notices a difference when doing so.  Patient states that she would really like to start walking again, and wants to try to go to [facility] to walk the track.  Patient explored how past experiences have hardwired her brain to think more negatively, including feeling bullied and not smart enough when she was young and how it still affects her today.    Plan:   Work on using positive self talk, and identify when negative self talk comes in to thoughts      Follow Up / Next Appointment: Next appointment: 09/07/23

## 2023-08-21 ENCOUNTER — TELEPHONE (OUTPATIENT)
Dept: PRIMARY CARE | Facility: CLINIC | Age: 83
End: 2023-08-21
Payer: MEDICARE

## 2023-08-21 NOTE — TELEPHONE ENCOUNTER
Pt lvm stating she need to reschdule appt on 9/7 @ 1pm with zenobia.     Can you call to get her reschedule

## 2023-08-25 ENCOUNTER — DOCUMENTATION (OUTPATIENT)
Dept: PRIMARY CARE | Facility: CLINIC | Age: 83
End: 2023-08-25
Payer: MEDICARE

## 2023-08-25 DIAGNOSIS — F32.4 MAJOR DEPRESSIVE DISORDER IN PARTIAL REMISSION, UNSPECIFIED WHETHER RECURRENT (CMS-HCC): Primary | ICD-10-CM

## 2023-08-25 PROCEDURE — 99494 1ST/SBSQ PSYC COLLAB CARE: CPT | Performed by: FAMILY MEDICINE

## 2023-08-25 PROCEDURE — 99493 SBSQ PSYC COLLAB CARE MGMT: CPT | Performed by: FAMILY MEDICINE

## 2023-09-06 ENCOUNTER — SOCIAL WORK (OUTPATIENT)
Dept: PRIMARY CARE | Facility: CLINIC | Age: 83
End: 2023-09-06
Payer: MEDICARE

## 2023-09-06 DIAGNOSIS — F32.4 MAJOR DEPRESSIVE DISORDER IN PARTIAL REMISSION, UNSPECIFIED WHETHER RECURRENT (CMS-HCC): Primary | ICD-10-CM

## 2023-09-06 ASSESSMENT — ANXIETY QUESTIONNAIRES
4. TROUBLE RELAXING: SEVERAL DAYS
IF YOU CHECKED OFF ANY PROBLEMS ON THIS QUESTIONNAIRE, HOW DIFFICULT HAVE THESE PROBLEMS MADE IT FOR YOU TO DO YOUR WORK, TAKE CARE OF THINGS AT HOME, OR GET ALONG WITH OTHER PEOPLE: SOMEWHAT DIFFICULT
6. BECOMING EASILY ANNOYED OR IRRITABLE: SEVERAL DAYS
2. NOT BEING ABLE TO STOP OR CONTROL WORRYING: SEVERAL DAYS
5. BEING SO RESTLESS THAT IT IS HARD TO SIT STILL: NOT AT ALL
7. FEELING AFRAID AS IF SOMETHING AWFUL MIGHT HAPPEN: SEVERAL DAYS
GAD7 TOTAL SCORE: 8
3. WORRYING TOO MUCH ABOUT DIFFERENT THINGS: MORE THAN HALF THE DAYS
1. FEELING NERVOUS, ANXIOUS, OR ON EDGE: MORE THAN HALF THE DAYS

## 2023-09-06 NOTE — PROGRESS NOTES
Collaborative Care (CoCM)  Progress Note    Type of Interaction: In Office    Start Time: 10:05 AM    End Time: 11:07 AM    Appointment: Scheduled    Reason for Visit:   Chief Complaint   Patient presents with    Follow-up      Interval History / Patient Symptoms:     Patient Health Questionnaire-9 Score: 4 (9/7/2023 10:23 AM)  ROJELIO-7 Total Score: 8 (9/6/2023 11:00 AM)    Interventions Provided: Behavioral Activation and Acceptance & Commitment Therapy      Progress Made: Slight    Response to Intervention: Patient explored current functioning; feeling down the past few weeks, doesn't feel like getting out much, not exercising or walking regularly.  Patient also worried about upcoming appointment for blood work, and wondering about the results.  Reports it has been 2 years of not feeling well.  Discussed what patient can and cannot control as well as health/medical anxiety.  Provided patient module 1 of Health Anxiety to read through prior to next appointment.    Plan:     Complete Module 1 of Health Anxiety    Follow Up / Next Appointment: Next appointment: 09/20/23

## 2023-09-07 ENCOUNTER — APPOINTMENT (OUTPATIENT)
Dept: PRIMARY CARE | Facility: CLINIC | Age: 83
End: 2023-09-07
Payer: MEDICARE

## 2023-09-07 ASSESSMENT — PATIENT HEALTH QUESTIONNAIRE - PHQ9
6. FEELING BAD ABOUT YOURSELF - OR THAT YOU ARE A FAILURE OR HAVE LET YOURSELF OR YOUR FAMILY DOWN: NOT AT ALL
7. TROUBLE CONCENTRATING ON THINGS, SUCH AS READING THE NEWSPAPER OR WATCHING TELEVISION: SEVERAL DAYS
1. LITTLE INTEREST OR PLEASURE IN DOING THINGS: SEVERAL DAYS
3. TROUBLE FALLING OR STAYING ASLEEP: NOT AT ALL
10. IF YOU CHECKED OFF ANY PROBLEMS, HOW DIFFICULT HAVE THESE PROBLEMS MADE IT FOR YOU TO DO YOUR WORK, TAKE CARE OF THINGS AT HOME, OR GET ALONG WITH OTHER PEOPLE: SOMEWHAT DIFFICULT
2. FEELING DOWN, DEPRESSED OR HOPELESS: SEVERAL DAYS
5. POOR APPETITE OR OVEREATING: NOT AT ALL
4. FEELING TIRED OR HAVING LITTLE ENERGY: SEVERAL DAYS
SUM OF ALL RESPONSES TO PHQ9 QUESTIONS 1 & 2: 2
SUM OF ALL RESPONSES TO PHQ QUESTIONS 1-9: 4
9. THOUGHTS THAT YOU WOULD BE BETTER OFF DEAD, OR OF HURTING YOURSELF: NOT AT ALL
8. MOVING OR SPEAKING SO SLOWLY THAT OTHER PEOPLE COULD HAVE NOTICED. OR THE OPPOSITE, BEING SO FIGETY OR RESTLESS THAT YOU HAVE BEEN MOVING AROUND A LOT MORE THAN USUAL: NOT AT ALL

## 2023-09-12 LAB
ALANINE AMINOTRANSFERASE (SGPT) (U/L) IN SER/PLAS: 39 U/L (ref 7–45)
ALBUMIN (G/DL) IN SER/PLAS: 4.4 G/DL (ref 3.4–5)
ALKALINE PHOSPHATASE (U/L) IN SER/PLAS: 77 U/L (ref 33–136)
ANION GAP IN SER/PLAS: 15 MMOL/L (ref 10–20)
ASPARTATE AMINOTRANSFERASE (SGOT) (U/L) IN SER/PLAS: 34 U/L (ref 9–39)
BILIRUBIN TOTAL (MG/DL) IN SER/PLAS: 0.5 MG/DL (ref 0–1.2)
CALCIUM (MG/DL) IN SER/PLAS: 9.5 MG/DL (ref 8.6–10.6)
CARBON DIOXIDE, TOTAL (MMOL/L) IN SER/PLAS: 25 MMOL/L (ref 21–32)
CHLORIDE (MMOL/L) IN SER/PLAS: 106 MMOL/L (ref 98–107)
CREATININE (MG/DL) IN SER/PLAS: 0.72 MG/DL (ref 0.5–1.05)
ERYTHROCYTE DISTRIBUTION WIDTH (RATIO) BY AUTOMATED COUNT: 14.1 % (ref 11.5–14.5)
ERYTHROCYTE MEAN CORPUSCULAR HEMOGLOBIN CONCENTRATION (G/DL) BY AUTOMATED: 31.6 G/DL (ref 32–36)
ERYTHROCYTE MEAN CORPUSCULAR VOLUME (FL) BY AUTOMATED COUNT: 95 FL (ref 80–100)
ERYTHROCYTES (10*6/UL) IN BLOOD BY AUTOMATED COUNT: 4.07 X10E12/L (ref 4–5.2)
GFR FEMALE: 82 ML/MIN/1.73M2
GLUCOSE (MG/DL) IN SER/PLAS: 114 MG/DL (ref 74–99)
HEMATOCRIT (%) IN BLOOD BY AUTOMATED COUNT: 38.6 % (ref 36–46)
HEMOGLOBIN (G/DL) IN BLOOD: 12.2 G/DL (ref 12–16)
HEMOGLOBIN (PG) IN RETICULOCYTES: 28 PG (ref 28–38)
IMMATURE RETIC FRACTION: 11.6 % (ref 0–16)
LEUKOCYTES (10*3/UL) IN BLOOD BY AUTOMATED COUNT: 11.3 X10E9/L (ref 4.4–11.3)
NRBC (PER 100 WBCS) BY AUTOMATED COUNT: 0 /100 WBC (ref 0–0)
PLATELETS (10*3/UL) IN BLOOD AUTOMATED COUNT: 175 X10E9/L (ref 150–450)
POTASSIUM (MMOL/L) IN SER/PLAS: 3.9 MMOL/L (ref 3.5–5.3)
PROTEIN TOTAL: 6.3 G/DL (ref 6.4–8.2)
RETICULOCYTES (10*3/UL) IN BLOOD: 0.06 X10E12/L (ref 0.02–0.11)
RETICULOCYTES/100 ERYTHROCYTES IN BLOOD BY AUTOMATED COUNT: 1.4 % (ref 0.5–2)
SODIUM (MMOL/L) IN SER/PLAS: 142 MMOL/L (ref 136–145)
UREA NITROGEN (MG/DL) IN SER/PLAS: 13 MG/DL (ref 6–23)

## 2023-09-13 DIAGNOSIS — E03.9 ADULT HYPOTHYROIDISM: ICD-10-CM

## 2023-09-20 ENCOUNTER — SOCIAL WORK (OUTPATIENT)
Dept: PRIMARY CARE | Facility: CLINIC | Age: 83
End: 2023-09-20
Payer: MEDICARE

## 2023-09-20 DIAGNOSIS — F32.4 MAJOR DEPRESSIVE DISORDER IN PARTIAL REMISSION, UNSPECIFIED WHETHER RECURRENT (CMS-HCC): Primary | ICD-10-CM

## 2023-09-20 ASSESSMENT — PATIENT HEALTH QUESTIONNAIRE - PHQ9
3. TROUBLE FALLING OR STAYING ASLEEP: NOT AT ALL
8. MOVING OR SPEAKING SO SLOWLY THAT OTHER PEOPLE COULD HAVE NOTICED. OR THE OPPOSITE, BEING SO FIGETY OR RESTLESS THAT YOU HAVE BEEN MOVING AROUND A LOT MORE THAN USUAL: NOT AT ALL
5. POOR APPETITE OR OVEREATING: NOT AT ALL
1. LITTLE INTEREST OR PLEASURE IN DOING THINGS: NOT AT ALL
SUM OF ALL RESPONSES TO PHQ9 QUESTIONS 1 & 2: 0
2. FEELING DOWN, DEPRESSED OR HOPELESS: NOT AT ALL
9. THOUGHTS THAT YOU WOULD BE BETTER OFF DEAD, OR OF HURTING YOURSELF: NOT AT ALL
6. FEELING BAD ABOUT YOURSELF - OR THAT YOU ARE A FAILURE OR HAVE LET YOURSELF OR YOUR FAMILY DOWN: NOT AT ALL
SUM OF ALL RESPONSES TO PHQ QUESTIONS 1-9: 1
7. TROUBLE CONCENTRATING ON THINGS, SUCH AS READING THE NEWSPAPER OR WATCHING TELEVISION: NOT AT ALL
4. FEELING TIRED OR HAVING LITTLE ENERGY: SEVERAL DAYS
10. IF YOU CHECKED OFF ANY PROBLEMS, HOW DIFFICULT HAVE THESE PROBLEMS MADE IT FOR YOU TO DO YOUR WORK, TAKE CARE OF THINGS AT HOME, OR GET ALONG WITH OTHER PEOPLE: SOMEWHAT DIFFICULT

## 2023-09-20 ASSESSMENT — ANXIETY QUESTIONNAIRES
IF YOU CHECKED OFF ANY PROBLEMS ON THIS QUESTIONNAIRE, HOW DIFFICULT HAVE THESE PROBLEMS MADE IT FOR YOU TO DO YOUR WORK, TAKE CARE OF THINGS AT HOME, OR GET ALONG WITH OTHER PEOPLE: SOMEWHAT DIFFICULT
6. BECOMING EASILY ANNOYED OR IRRITABLE: NOT AT ALL
2. NOT BEING ABLE TO STOP OR CONTROL WORRYING: NOT AT ALL
1. FEELING NERVOUS, ANXIOUS, OR ON EDGE: NOT AT ALL
7. FEELING AFRAID AS IF SOMETHING AWFUL MIGHT HAPPEN: NOT AT ALL
4. TROUBLE RELAXING: SEVERAL DAYS
GAD7 TOTAL SCORE: 2
3. WORRYING TOO MUCH ABOUT DIFFERENT THINGS: SEVERAL DAYS
5. BEING SO RESTLESS THAT IT IS HARD TO SIT STILL: NOT AT ALL

## 2023-09-20 NOTE — PROGRESS NOTES
"Collaborative Care (CoCM)  Progress Note    Type of Interaction: In Office    Start Time: 1:02 PM    End Time: 1:30 PM    Appointment: Scheduled    Reason for Visit:   Chief Complaint   Patient presents with    Follow-up      Interval History / Patient Symptoms:     Patient Health Questionnaire-9 Score: 1 (9/20/2023  1:30 PM)  ROJELIO-7 Total Score: 2 (9/20/2023  1:30 PM)    Interventions Provided: Acceptance & Commitment Therapy and Interpersonal Therapy      Progress Made: some    Response to Intervention: Patient reports feeling relief that her dr appointment went well for her bloodwork.  States that she has been dealing with worries of \"what's wrong with me?\" For 2 years and glad that she is ok.    Discussed importance of mindset when dealing with challenges and discussed how patient can change mindset.  Patient states she has not been able to read through the module due to having covid.    Plan:     Walk/Exercise and read through the modules from previous appointment.    Follow Up / Next Appointment: Next appointment: 10/04/23      "

## 2023-09-21 DIAGNOSIS — H69.83 OTHER SPECIFIED DISORDERS OF EUSTACHIAN TUBE, BILATERAL: ICD-10-CM

## 2023-09-21 DIAGNOSIS — E78.2 MIXED HYPERLIPIDEMIA: Primary | ICD-10-CM

## 2023-09-21 RX ORDER — FLUTICASONE PROPIONATE 50 MCG
2 SPRAY, SUSPENSION (ML) NASAL DAILY
Qty: 48 ML | Refills: 1 | Status: SHIPPED | OUTPATIENT
Start: 2023-09-21 | End: 2024-05-17 | Stop reason: SDUPTHER

## 2023-09-21 RX ORDER — SIMVASTATIN 40 MG/1
40 TABLET, FILM COATED ORAL DAILY
Qty: 90 TABLET | Refills: 2 | Status: SHIPPED | OUTPATIENT
Start: 2023-09-21

## 2023-09-29 ENCOUNTER — DOCUMENTATION (OUTPATIENT)
Dept: PRIMARY CARE | Facility: CLINIC | Age: 83
End: 2023-09-29
Payer: MEDICARE

## 2023-09-29 DIAGNOSIS — F32.4 MAJOR DEPRESSIVE DISORDER IN PARTIAL REMISSION, UNSPECIFIED WHETHER RECURRENT (CMS-HCC): Primary | ICD-10-CM

## 2023-09-29 PROCEDURE — 99494 1ST/SBSQ PSYC COLLAB CARE: CPT | Performed by: FAMILY MEDICINE

## 2023-09-29 PROCEDURE — 99493 SBSQ PSYC COLLAB CARE MGMT: CPT | Performed by: FAMILY MEDICINE

## 2023-10-04 ENCOUNTER — SOCIAL WORK (OUTPATIENT)
Dept: PRIMARY CARE | Facility: CLINIC | Age: 83
End: 2023-10-04
Payer: MEDICARE

## 2023-10-04 DIAGNOSIS — F32.4 MAJOR DEPRESSIVE DISORDER IN PARTIAL REMISSION, UNSPECIFIED WHETHER RECURRENT (CMS-HCC): Primary | ICD-10-CM

## 2023-10-04 ASSESSMENT — PATIENT HEALTH QUESTIONNAIRE - PHQ9
10. IF YOU CHECKED OFF ANY PROBLEMS, HOW DIFFICULT HAVE THESE PROBLEMS MADE IT FOR YOU TO DO YOUR WORK, TAKE CARE OF THINGS AT HOME, OR GET ALONG WITH OTHER PEOPLE: SOMEWHAT DIFFICULT
SUM OF ALL RESPONSES TO PHQ QUESTIONS 1-9: 3
8. MOVING OR SPEAKING SO SLOWLY THAT OTHER PEOPLE COULD HAVE NOTICED. OR THE OPPOSITE, BEING SO FIGETY OR RESTLESS THAT YOU HAVE BEEN MOVING AROUND A LOT MORE THAN USUAL: NOT AT ALL
9. THOUGHTS THAT YOU WOULD BE BETTER OFF DEAD, OR OF HURTING YOURSELF: NOT AT ALL
6. FEELING BAD ABOUT YOURSELF - OR THAT YOU ARE A FAILURE OR HAVE LET YOURSELF OR YOUR FAMILY DOWN: NOT AT ALL
5. POOR APPETITE OR OVEREATING: NOT AT ALL
3. TROUBLE FALLING OR STAYING ASLEEP: NOT AT ALL
2. FEELING DOWN, DEPRESSED OR HOPELESS: SEVERAL DAYS
7. TROUBLE CONCENTRATING ON THINGS, SUCH AS READING THE NEWSPAPER OR WATCHING TELEVISION: NOT AT ALL
SUM OF ALL RESPONSES TO PHQ9 QUESTIONS 1 & 2: 2
1. LITTLE INTEREST OR PLEASURE IN DOING THINGS: SEVERAL DAYS
4. FEELING TIRED OR HAVING LITTLE ENERGY: SEVERAL DAYS

## 2023-10-04 ASSESSMENT — ANXIETY QUESTIONNAIRES
2. NOT BEING ABLE TO STOP OR CONTROL WORRYING: NOT AT ALL
7. FEELING AFRAID AS IF SOMETHING AWFUL MIGHT HAPPEN: NOT AT ALL
IF YOU CHECKED OFF ANY PROBLEMS ON THIS QUESTIONNAIRE, HOW DIFFICULT HAVE THESE PROBLEMS MADE IT FOR YOU TO DO YOUR WORK, TAKE CARE OF THINGS AT HOME, OR GET ALONG WITH OTHER PEOPLE: SOMEWHAT DIFFICULT
6. BECOMING EASILY ANNOYED OR IRRITABLE: NOT AT ALL
1. FEELING NERVOUS, ANXIOUS, OR ON EDGE: SEVERAL DAYS
3. WORRYING TOO MUCH ABOUT DIFFERENT THINGS: NOT AT ALL
5. BEING SO RESTLESS THAT IT IS HARD TO SIT STILL: NOT AT ALL
GAD7 TOTAL SCORE: 2
4. TROUBLE RELAXING: SEVERAL DAYS

## 2023-10-04 NOTE — PROGRESS NOTES
Collaborative Care (CoCM)  Progress Note    Type of Interaction: In Office    Start Time: 12:55 PM    End Time: 1:52 PM    Appointment: Scheduled    Reason for Visit:   Chief Complaint   Patient presents with    Follow-up      Interval History / Patient Symptoms:     Patient Health Questionnaire-9 Score: 3 (10/4/2023  1:00 PM)  ROJELIO-7 Total Score: 2 (10/4/2023 12:57 PM)    Interventions Provided: Acceptance & Commitment Therapy and Motivational Interviewing    Progress Made: Slight    Response to Intervention: Reviewed patient's current functioning, concerns and ways to manage challenging thoughts, particularly about her medical concerns.  Discussed the module on health anxiety, and processed what patient was able to relate to within the module.    Practiced ways to change negative thoughts.    Plan:   Patient has questions regarding her famotidine and hematology results as well as thyroid- encouraged patient to make a follow up appointment with PCP.  K    Follow Up / Next Appointment: Next appointment: 10/25/23

## 2023-10-06 ENCOUNTER — TELEPHONE (OUTPATIENT)
Dept: PRIMARY CARE | Facility: CLINIC | Age: 83
End: 2023-10-06
Payer: MEDICARE

## 2023-10-06 DIAGNOSIS — E03.9 ADULT HYPOTHYROIDISM: Primary | ICD-10-CM

## 2023-10-06 NOTE — PROGRESS NOTES
Marilee Yao DO sent to OLIVER Simmons  Orders placed         Previous Messages       ----- Message -----  From: OLIVER Simmons  Sent: 10/6/2023   9:19 AM EDT  To: Marilee Yao DO  Subject: Labs                                            I met with Celena this week;  I had her make a follow up appointment with you per your July visit note.  She is scheduled to come in October 30.  Her most recent TSH was collected in June- per your note in July, you wanted her to have it checked again in 8 weeks, but I didn't see a lab order.  She was wondering if you still want her to get it done.

## 2023-10-12 DIAGNOSIS — F41.9 ANXIETY DISORDER, UNSPECIFIED: ICD-10-CM

## 2023-10-12 RX ORDER — CITALOPRAM 20 MG/1
40 TABLET, FILM COATED ORAL DAILY
Qty: 180 TABLET | Refills: 0 | Status: SHIPPED | OUTPATIENT
Start: 2023-10-12 | End: 2023-10-12 | Stop reason: SDUPTHER

## 2023-10-12 RX ORDER — CITALOPRAM 20 MG/1
40 TABLET, FILM COATED ORAL DAILY
Qty: 180 TABLET | Refills: 0 | Status: SHIPPED | OUTPATIENT
Start: 2023-10-12 | End: 2023-10-30 | Stop reason: DRUGHIGH

## 2023-10-25 ENCOUNTER — TELEPHONE (OUTPATIENT)
Dept: PRIMARY CARE | Facility: CLINIC | Age: 83
End: 2023-10-25

## 2023-10-25 ENCOUNTER — SOCIAL WORK (OUTPATIENT)
Dept: PRIMARY CARE | Facility: CLINIC | Age: 83
End: 2023-10-25
Payer: MEDICARE

## 2023-10-25 DIAGNOSIS — F32.4 MAJOR DEPRESSIVE DISORDER IN PARTIAL REMISSION, UNSPECIFIED WHETHER RECURRENT (CMS-HCC): Primary | ICD-10-CM

## 2023-10-25 ASSESSMENT — PATIENT HEALTH QUESTIONNAIRE - PHQ9
7. TROUBLE CONCENTRATING ON THINGS, SUCH AS READING THE NEWSPAPER OR WATCHING TELEVISION: SEVERAL DAYS
4. FEELING TIRED OR HAVING LITTLE ENERGY: SEVERAL DAYS
5. POOR APPETITE OR OVEREATING: NOT AT ALL
6. FEELING BAD ABOUT YOURSELF - OR THAT YOU ARE A FAILURE OR HAVE LET YOURSELF OR YOUR FAMILY DOWN: SEVERAL DAYS
8. MOVING OR SPEAKING SO SLOWLY THAT OTHER PEOPLE COULD HAVE NOTICED. OR THE OPPOSITE, BEING SO FIGETY OR RESTLESS THAT YOU HAVE BEEN MOVING AROUND A LOT MORE THAN USUAL: NOT AT ALL
1. LITTLE INTEREST OR PLEASURE IN DOING THINGS: SEVERAL DAYS
SUM OF ALL RESPONSES TO PHQ QUESTIONS 1-9: 5
SUM OF ALL RESPONSES TO PHQ9 QUESTIONS 1 & 2: 2
10. IF YOU CHECKED OFF ANY PROBLEMS, HOW DIFFICULT HAVE THESE PROBLEMS MADE IT FOR YOU TO DO YOUR WORK, TAKE CARE OF THINGS AT HOME, OR GET ALONG WITH OTHER PEOPLE: SOMEWHAT DIFFICULT
3. TROUBLE FALLING OR STAYING ASLEEP: NOT AT ALL
9. THOUGHTS THAT YOU WOULD BE BETTER OFF DEAD, OR OF HURTING YOURSELF: NOT AT ALL
2. FEELING DOWN, DEPRESSED OR HOPELESS: SEVERAL DAYS

## 2023-10-25 ASSESSMENT — ANXIETY QUESTIONNAIRES
5. BEING SO RESTLESS THAT IT IS HARD TO SIT STILL: NOT AT ALL
IF YOU CHECKED OFF ANY PROBLEMS ON THIS QUESTIONNAIRE, HOW DIFFICULT HAVE THESE PROBLEMS MADE IT FOR YOU TO DO YOUR WORK, TAKE CARE OF THINGS AT HOME, OR GET ALONG WITH OTHER PEOPLE: SOMEWHAT DIFFICULT
GAD7 TOTAL SCORE: 3
6. BECOMING EASILY ANNOYED OR IRRITABLE: NOT AT ALL
3. WORRYING TOO MUCH ABOUT DIFFERENT THINGS: SEVERAL DAYS
1. FEELING NERVOUS, ANXIOUS, OR ON EDGE: SEVERAL DAYS
7. FEELING AFRAID AS IF SOMETHING AWFUL MIGHT HAPPEN: NOT AT ALL
4. TROUBLE RELAXING: NOT AT ALL
2. NOT BEING ABLE TO STOP OR CONTROL WORRYING: SEVERAL DAYS

## 2023-10-25 NOTE — PROGRESS NOTES
Celena was in today; stated that she got her prescription for citalopram refilled, but it is for 40mg.  She was previously taking 20mg. Celena is wondering if you wanted her to continue to take the 40mg? She has an appt with you on Oct 30.

## 2023-10-25 NOTE — PROGRESS NOTES
Collaborative Care (CoCM)  Progress Note    Type of Interaction: In Office    Start Time: 12:55 PM    End Time: 1:53 PM    Appointment: Scheduled    Reason for Visit:   Chief Complaint   Patient presents with    Follow-up      Interval History / Patient Symptoms:     Patient Health Questionnaire-9 Score: 5 (10/25/2023  1:02 PM)  ROJELIO-7 Total Score: 3 (10/25/2023  1:00 PM)      Interventions Provided: Behavioral Activation      Progress Made: Slight    Response to Intervention: Patient reports just getting back from a trip to [facility] for her sister-in-law's .  States she had a good time with her family.    Pt stated she started weight watchers with her daughter, and is hoping that it will help her lose some weight.  Reports low energy/fatigue, but otherwise feels good.  Pt shared that she felt comfortable saying 'no' to a dinner invitation from her daughter.  Discussed the importance of getting things from her to-do list completed, and work on the pile of documents she needs to go through that are on her dining room table.      Plan:   Pt to see PCP on Monday and will follow up on medication concerns at that time  Go through documents on dining room table     Follow Up / Next Appointment: Next appointment: 11/15/23

## 2023-10-26 ENCOUNTER — LAB (OUTPATIENT)
Dept: LAB | Facility: LAB | Age: 83
End: 2023-10-26
Payer: MEDICARE

## 2023-10-26 DIAGNOSIS — E03.9 ADULT HYPOTHYROIDISM: ICD-10-CM

## 2023-10-26 DIAGNOSIS — D72.829 ELEVATED WHITE BLOOD CELL COUNT, UNSPECIFIED: ICD-10-CM

## 2023-10-26 LAB
BASOPHILS # BLD AUTO: 0.05 X10*3/UL (ref 0–0.1)
BASOPHILS NFR BLD AUTO: 0.4 %
EOSINOPHIL # BLD AUTO: 0.16 X10*3/UL (ref 0–0.4)
EOSINOPHIL NFR BLD AUTO: 1.1 %
ERYTHROCYTE [DISTWIDTH] IN BLOOD BY AUTOMATED COUNT: 14.2 % (ref 11.5–14.5)
HCT VFR BLD AUTO: 37.9 % (ref 36–46)
HGB BLD-MCNC: 11.9 G/DL (ref 12–16)
HGB RETIC QN: 35 PG (ref 28–38)
IMM GRANULOCYTES # BLD AUTO: 0.05 X10*3/UL (ref 0–0.5)
IMM GRANULOCYTES NFR BLD AUTO: 0.4 % (ref 0–0.9)
IMMATURE RETIC FRACTION: 16.7 %
LYMPHOCYTES # BLD AUTO: 7.72 X10*3/UL (ref 0.8–3)
LYMPHOCYTES NFR BLD AUTO: 54.9 %
MCH RBC QN AUTO: 30 PG (ref 26–34)
MCHC RBC AUTO-ENTMCNC: 31.4 G/DL (ref 32–36)
MCV RBC AUTO: 96 FL (ref 80–100)
MONOCYTES # BLD AUTO: 0.82 X10*3/UL (ref 0.05–0.8)
MONOCYTES NFR BLD AUTO: 5.8 %
NEUTROPHILS # BLD AUTO: 5.27 X10*3/UL (ref 1.6–5.5)
NEUTROPHILS NFR BLD AUTO: 37.4 %
NRBC BLD-RTO: 0 /100 WBCS (ref 0–0)
PLATELET # BLD AUTO: 195 X10*3/UL (ref 150–450)
PMV BLD AUTO: 12.2 FL (ref 7.5–11.5)
RBC # BLD AUTO: 3.97 X10*6/UL (ref 4–5.2)
RETICS #: 0.07 X10*6/UL (ref 0.02–0.11)
RETICS/RBC NFR AUTO: 1.9 % (ref 0.5–2)
TSH SERPL-ACNC: 2.77 MIU/L (ref 0.44–3.98)
WBC # BLD AUTO: 14.1 X10*3/UL (ref 4.4–11.3)

## 2023-10-26 PROCEDURE — 36415 COLL VENOUS BLD VENIPUNCTURE: CPT

## 2023-10-26 PROCEDURE — 84443 ASSAY THYROID STIM HORMONE: CPT

## 2023-10-26 PROCEDURE — 85045 AUTOMATED RETICULOCYTE COUNT: CPT

## 2023-10-26 PROCEDURE — 85025 COMPLETE CBC W/AUTO DIFF WBC: CPT

## 2023-10-30 ENCOUNTER — DOCUMENTATION (OUTPATIENT)
Dept: PRIMARY CARE | Facility: CLINIC | Age: 83
End: 2023-10-30

## 2023-10-30 ENCOUNTER — OFFICE VISIT (OUTPATIENT)
Dept: PRIMARY CARE | Facility: CLINIC | Age: 83
End: 2023-10-30
Payer: MEDICARE

## 2023-10-30 VITALS — HEART RATE: 86 BPM | DIASTOLIC BLOOD PRESSURE: 80 MMHG | OXYGEN SATURATION: 98 % | SYSTOLIC BLOOD PRESSURE: 120 MMHG

## 2023-10-30 DIAGNOSIS — F32.4 MAJOR DEPRESSIVE DISORDER IN PARTIAL REMISSION, UNSPECIFIED WHETHER RECURRENT (CMS-HCC): Primary | ICD-10-CM

## 2023-10-30 DIAGNOSIS — F41.9 ANXIETY DISORDER, UNSPECIFIED: ICD-10-CM

## 2023-10-30 PROCEDURE — 1159F MED LIST DOCD IN RCRD: CPT | Performed by: FAMILY MEDICINE

## 2023-10-30 PROCEDURE — 99214 OFFICE O/P EST MOD 30 MIN: CPT | Performed by: FAMILY MEDICINE

## 2023-10-30 PROCEDURE — 99494 1ST/SBSQ PSYC COLLAB CARE: CPT | Performed by: FAMILY MEDICINE

## 2023-10-30 PROCEDURE — 1160F RVW MEDS BY RX/DR IN RCRD: CPT | Performed by: FAMILY MEDICINE

## 2023-10-30 PROCEDURE — 99493 SBSQ PSYC COLLAB CARE MGMT: CPT | Performed by: FAMILY MEDICINE

## 2023-10-30 PROCEDURE — 1036F TOBACCO NON-USER: CPT | Performed by: FAMILY MEDICINE

## 2023-10-30 RX ORDER — CITALOPRAM 20 MG/1
20 TABLET, FILM COATED ORAL DAILY
Qty: 180 TABLET | Refills: 0 | Status: SHIPPED
Start: 2023-10-30 | End: 2024-01-23 | Stop reason: SDUPTHER

## 2023-10-30 ASSESSMENT — LIFESTYLE VARIABLES
AUDIT-C TOTAL SCORE: 4
HOW OFTEN DO YOU HAVE SIX OR MORE DRINKS ON ONE OCCASION: NEVER
HOW MANY STANDARD DRINKS CONTAINING ALCOHOL DO YOU HAVE ON A TYPICAL DAY: 1 OR 2
HOW OFTEN DO YOU HAVE A DRINK CONTAINING ALCOHOL: 4 OR MORE TIMES A WEEK
SKIP TO QUESTIONS 9-10: 1

## 2023-10-30 ASSESSMENT — ANXIETY QUESTIONNAIRES
5. BEING SO RESTLESS THAT IT IS HARD TO SIT STILL: NOT AT ALL
3. WORRYING TOO MUCH ABOUT DIFFERENT THINGS: SEVERAL DAYS
2. NOT BEING ABLE TO STOP OR CONTROL WORRYING: NOT AT ALL
6. BECOMING EASILY ANNOYED OR IRRITABLE: SEVERAL DAYS
GAD7 TOTAL SCORE: 4
IF YOU CHECKED OFF ANY PROBLEMS ON THIS QUESTIONNAIRE, HOW DIFFICULT HAVE THESE PROBLEMS MADE IT FOR YOU TO DO YOUR WORK, TAKE CARE OF THINGS AT HOME, OR GET ALONG WITH OTHER PEOPLE: SOMEWHAT DIFFICULT
1. FEELING NERVOUS, ANXIOUS, OR ON EDGE: SEVERAL DAYS
7. FEELING AFRAID AS IF SOMETHING AWFUL MIGHT HAPPEN: NOT AT ALL
4. TROUBLE RELAXING: SEVERAL DAYS

## 2023-10-30 ASSESSMENT — PATIENT HEALTH QUESTIONNAIRE - PHQ9
SUM OF ALL RESPONSES TO PHQ9 QUESTIONS 1 AND 2: 2
2. FEELING DOWN, DEPRESSED OR HOPELESS: SEVERAL DAYS
10. IF YOU CHECKED OFF ANY PROBLEMS, HOW DIFFICULT HAVE THESE PROBLEMS MADE IT FOR YOU TO DO YOUR WORK, TAKE CARE OF THINGS AT HOME, OR GET ALONG WITH OTHER PEOPLE: SOMEWHAT DIFFICULT
1. LITTLE INTEREST OR PLEASURE IN DOING THINGS: SEVERAL DAYS
4. FEELING TIRED OR HAVING LITTLE ENERGY: SEVERAL DAYS
7. TROUBLE CONCENTRATING ON THINGS, SUCH AS READING THE NEWSPAPER OR WATCHING TELEVISION: MORE THAN HALF THE DAYS
8. MOVING OR SPEAKING SO SLOWLY THAT OTHER PEOPLE COULD HAVE NOTICED. OR THE OPPOSITE, BEING SO FIGETY OR RESTLESS THAT YOU HAVE BEEN MOVING AROUND A LOT MORE THAN USUAL: NOT AT ALL
6. FEELING BAD ABOUT YOURSELF - OR THAT YOU ARE A FAILURE OR HAVE LET YOURSELF OR YOUR FAMILY DOWN: MORE THAN HALF THE DAYS
SUM OF ALL RESPONSES TO PHQ QUESTIONS 1-9: 8
9. THOUGHTS THAT YOU WOULD BE BETTER OFF DEAD, OR OF HURTING YOURSELF: NOT AT ALL
3. TROUBLE FALLING OR STAYING ASLEEP OR SLEEPING TOO MUCH: NOT AT ALL
5. POOR APPETITE OR OVEREATING: SEVERAL DAYS

## 2023-10-30 NOTE — PROGRESS NOTES
Subjective   Patient ID: Diana Azul is a 83 y.o. female who presents for Follow-up.  HPI    ANXIETY: moods doing a little better on Celexa and Bupropion  Did go up on Celexa to 40 mg no change in symptoms  Working with RAJ Lowe, with our Behavioral Collaborative Care team.  Patient Health Questionnaire-9 Score: 8   ROJELIO-7 Total Score: 4     Recently lost her sister-in-law  Visited her brother in [place] - did enjoy seeing her family    RECURRING UTI: taking otc Azo at night and seems to be helping  Drinking water more regularly and symptoms seem to be controlled    HYPOTHYROID: feeling well on current levothyroxine dosage    GERD: taking Omeprazole and famotidine regularly and no change in abdominal bloating  Rejoined weight watchers and went to 1 session.     She does admit that she has one glass of wine per night    LEUKOCYTOSIS: followed by Dr. Casal  Next appointment 12/8/2023  Review of last CBC with mild increase in WBC compared to last draw    Review of Systems    Review of Systems negative except as noted in HPI and Chief complaint.     Objective     Patient Health Questionnaire-9 Score: 8     ROJELIO-7 Total Score: 4     Physical Exam  Constitutional:       General: She is not in acute distress.     Appearance: Normal appearance. She is not ill-appearing.   HENT:      Head: Normocephalic and atraumatic.   Neck:      Vascular: No carotid bruit.   Cardiovascular:      Rate and Rhythm: Normal rate and regular rhythm.      Pulses: Normal pulses.      Heart sounds: Normal heart sounds. No murmur heard.     No gallop.   Pulmonary:      Effort: Pulmonary effort is normal.      Breath sounds: Normal breath sounds. No wheezing, rhonchi or rales.   Musculoskeletal:      Cervical back: Normal range of motion and neck supple. No rigidity or tenderness.   Lymphadenopathy:      Cervical: No cervical adenopathy.   Skin:     General: Skin is warm and dry.   Neurological:      Mental Status: She is alert.    Psychiatric:         Mood and Affect: Mood normal.         Behavior: Behavior normal.       /80 (BP Location: Left arm, Patient Position: Sitting, BP Cuff Size: Adult)   Pulse 86   SpO2 98%      Assessment/Plan   Problem List Items Addressed This Visit    None  Visit Diagnoses       Anxiety disorder, unspecified        Relevant Medications    citalopram (CeleXA) 20 mg tablet          FOLLOW UP 6 MONTHS for Medicare AWV.

## 2023-11-15 ENCOUNTER — SOCIAL WORK (OUTPATIENT)
Dept: PRIMARY CARE | Facility: CLINIC | Age: 83
End: 2023-11-15
Payer: MEDICARE

## 2023-11-15 DIAGNOSIS — F32.4 MAJOR DEPRESSIVE DISORDER IN PARTIAL REMISSION, UNSPECIFIED WHETHER RECURRENT (CMS-HCC): Primary | ICD-10-CM

## 2023-11-15 ASSESSMENT — ANXIETY QUESTIONNAIRES
2. NOT BEING ABLE TO STOP OR CONTROL WORRYING: MORE THAN HALF THE DAYS
6. BECOMING EASILY ANNOYED OR IRRITABLE: MORE THAN HALF THE DAYS
4. TROUBLE RELAXING: MORE THAN HALF THE DAYS
IF YOU CHECKED OFF ANY PROBLEMS ON THIS QUESTIONNAIRE, HOW DIFFICULT HAVE THESE PROBLEMS MADE IT FOR YOU TO DO YOUR WORK, TAKE CARE OF THINGS AT HOME, OR GET ALONG WITH OTHER PEOPLE: SOMEWHAT DIFFICULT
1. FEELING NERVOUS, ANXIOUS, OR ON EDGE: MORE THAN HALF THE DAYS
GAD7 TOTAL SCORE: 10
5. BEING SO RESTLESS THAT IT IS HARD TO SIT STILL: NOT AT ALL
3. WORRYING TOO MUCH ABOUT DIFFERENT THINGS: MORE THAN HALF THE DAYS
7. FEELING AFRAID AS IF SOMETHING AWFUL MIGHT HAPPEN: NOT AT ALL

## 2023-11-15 ASSESSMENT — PATIENT HEALTH QUESTIONNAIRE - PHQ9
SUM OF ALL RESPONSES TO PHQ9 QUESTIONS 1 & 2: 2
6. FEELING BAD ABOUT YOURSELF - OR THAT YOU ARE A FAILURE OR HAVE LET YOURSELF OR YOUR FAMILY DOWN: SEVERAL DAYS
SUM OF ALL RESPONSES TO PHQ QUESTIONS 1-9: 9
9. THOUGHTS THAT YOU WOULD BE BETTER OFF DEAD, OR OF HURTING YOURSELF: NOT AT ALL
2. FEELING DOWN, DEPRESSED OR HOPELESS: SEVERAL DAYS
1. LITTLE INTEREST OR PLEASURE IN DOING THINGS: SEVERAL DAYS
4. FEELING TIRED OR HAVING LITTLE ENERGY: SEVERAL DAYS
8. MOVING OR SPEAKING SO SLOWLY THAT OTHER PEOPLE COULD HAVE NOTICED. OR THE OPPOSITE, BEING SO FIGETY OR RESTLESS THAT YOU HAVE BEEN MOVING AROUND A LOT MORE THAN USUAL: SEVERAL DAYS
5. POOR APPETITE OR OVEREATING: SEVERAL DAYS
7. TROUBLE CONCENTRATING ON THINGS, SUCH AS READING THE NEWSPAPER OR WATCHING TELEVISION: NEARLY EVERY DAY
10. IF YOU CHECKED OFF ANY PROBLEMS, HOW DIFFICULT HAVE THESE PROBLEMS MADE IT FOR YOU TO DO YOUR WORK, TAKE CARE OF THINGS AT HOME, OR GET ALONG WITH OTHER PEOPLE: SOMEWHAT DIFFICULT
3. TROUBLE FALLING OR STAYING ASLEEP: NOT AT ALL

## 2023-11-15 NOTE — PROGRESS NOTES
"Collaborative Care (CoCM)  Progress Note    Type of Interaction: In Office    Start Time: 1:15 PM    End Time: 1:58 PM    Appointment: Scheduled    Reason for Visit:   Chief Complaint   Patient presents with    Follow-up      Interval History / Patient Symptoms:     Patient Health Questionnaire-9 Score: 9 (11/15/2023  1:19 PM)  ROJELIO-7 Total Score: 10 (11/15/2023  1:18 PM)    Interventions Provided: CBT      Progress Made: slight    Response to Intervention: More anxious and stressed about the holidays coming up- \"all the activity going on\".  Discussed ways to manage stress/anxiety, including deep breathing.  Pt stated that she has been talking to her brother more lately, and has enjoyed reconnecting with him  Talked about self care as a way to manage stress/anxiety.  Pt is getting hair and nails done this week.    Plan:     Practice self care    Follow Up / Next Appointment: Next appointment: 12/06/23      "

## 2023-11-16 RX ORDER — LEVOTHYROXINE SODIUM 25 UG/1
25 TABLET ORAL DAILY
Qty: 90 TABLET | Refills: 1 | Status: SHIPPED | OUTPATIENT
Start: 2023-11-16 | End: 2024-01-23 | Stop reason: SDUPTHER

## 2023-11-30 ENCOUNTER — DOCUMENTATION (OUTPATIENT)
Dept: PRIMARY CARE | Facility: CLINIC | Age: 83
End: 2023-11-30
Payer: MEDICARE

## 2023-11-30 DIAGNOSIS — F32.4 MAJOR DEPRESSIVE DISORDER IN PARTIAL REMISSION, UNSPECIFIED WHETHER RECURRENT (CMS-HCC): Primary | ICD-10-CM

## 2023-11-30 PROCEDURE — 99493 SBSQ PSYC COLLAB CARE MGMT: CPT | Performed by: FAMILY MEDICINE

## 2023-12-06 ENCOUNTER — SOCIAL WORK (OUTPATIENT)
Dept: PRIMARY CARE | Facility: CLINIC | Age: 83
End: 2023-12-06
Payer: MEDICARE

## 2023-12-06 ENCOUNTER — LAB (OUTPATIENT)
Dept: LAB | Facility: LAB | Age: 83
End: 2023-12-06
Payer: MEDICARE

## 2023-12-06 DIAGNOSIS — D72.829 ELEVATED WHITE BLOOD CELL COUNT, UNSPECIFIED: ICD-10-CM

## 2023-12-06 DIAGNOSIS — F32.4 MAJOR DEPRESSIVE DISORDER IN PARTIAL REMISSION, UNSPECIFIED WHETHER RECURRENT (CMS-HCC): Primary | ICD-10-CM

## 2023-12-06 DIAGNOSIS — D72.829 ELEVATED WHITE BLOOD CELL COUNT, UNSPECIFIED: Primary | ICD-10-CM

## 2023-12-06 LAB
ALBUMIN SERPL BCP-MCNC: 4.8 G/DL (ref 3.4–5)
ALP SERPL-CCNC: 62 U/L (ref 33–136)
ALT SERPL W P-5'-P-CCNC: 28 U/L (ref 7–45)
ANION GAP SERPL CALC-SCNC: 16 MMOL/L (ref 10–20)
AST SERPL W P-5'-P-CCNC: 21 U/L (ref 9–39)
BILIRUB SERPL-MCNC: 0.6 MG/DL (ref 0–1.2)
BUN SERPL-MCNC: 14 MG/DL (ref 6–23)
CALCIUM SERPL-MCNC: 9.7 MG/DL (ref 8.6–10.6)
CHLORIDE SERPL-SCNC: 103 MMOL/L (ref 98–107)
CO2 SERPL-SCNC: 26 MMOL/L (ref 21–32)
CREAT SERPL-MCNC: 0.74 MG/DL (ref 0.5–1.05)
GFR SERPL CREATININE-BSD FRML MDRD: 80 ML/MIN/1.73M*2
GLUCOSE SERPL-MCNC: 107 MG/DL (ref 74–99)
POTASSIUM SERPL-SCNC: 4.2 MMOL/L (ref 3.5–5.3)
PROT SERPL-MCNC: 6.7 G/DL (ref 6.4–8.2)
SODIUM SERPL-SCNC: 141 MMOL/L (ref 136–145)

## 2023-12-06 PROCEDURE — 80053 COMPREHEN METABOLIC PANEL: CPT

## 2023-12-06 PROCEDURE — 36415 COLL VENOUS BLD VENIPUNCTURE: CPT

## 2023-12-06 ASSESSMENT — PATIENT HEALTH QUESTIONNAIRE - PHQ9
2. FEELING DOWN, DEPRESSED OR HOPELESS: NEARLY EVERY DAY
9. THOUGHTS THAT YOU WOULD BE BETTER OFF DEAD, OR OF HURTING YOURSELF: NOT AT ALL
4. FEELING TIRED OR HAVING LITTLE ENERGY: MORE THAN HALF THE DAYS
8. MOVING OR SPEAKING SO SLOWLY THAT OTHER PEOPLE COULD HAVE NOTICED. OR THE OPPOSITE, BEING SO FIGETY OR RESTLESS THAT YOU HAVE BEEN MOVING AROUND A LOT MORE THAN USUAL: NOT AT ALL
SUM OF ALL RESPONSES TO PHQ QUESTIONS 1-9: 10
5. POOR APPETITE OR OVEREATING: SEVERAL DAYS
10. IF YOU CHECKED OFF ANY PROBLEMS, HOW DIFFICULT HAVE THESE PROBLEMS MADE IT FOR YOU TO DO YOUR WORK, TAKE CARE OF THINGS AT HOME, OR GET ALONG WITH OTHER PEOPLE: SOMEWHAT DIFFICULT
7. TROUBLE CONCENTRATING ON THINGS, SUCH AS READING THE NEWSPAPER OR WATCHING TELEVISION: MORE THAN HALF THE DAYS
SUM OF ALL RESPONSES TO PHQ9 QUESTIONS 1 & 2: 5
6. FEELING BAD ABOUT YOURSELF - OR THAT YOU ARE A FAILURE OR HAVE LET YOURSELF OR YOUR FAMILY DOWN: NOT AT ALL
3. TROUBLE FALLING OR STAYING ASLEEP: NOT AT ALL
1. LITTLE INTEREST OR PLEASURE IN DOING THINGS: MORE THAN HALF THE DAYS

## 2023-12-06 ASSESSMENT — ANXIETY QUESTIONNAIRES
IF YOU CHECKED OFF ANY PROBLEMS ON THIS QUESTIONNAIRE, HOW DIFFICULT HAVE THESE PROBLEMS MADE IT FOR YOU TO DO YOUR WORK, TAKE CARE OF THINGS AT HOME, OR GET ALONG WITH OTHER PEOPLE: SOMEWHAT DIFFICULT
5. BEING SO RESTLESS THAT IT IS HARD TO SIT STILL: SEVERAL DAYS
6. BECOMING EASILY ANNOYED OR IRRITABLE: MORE THAN HALF THE DAYS
2. NOT BEING ABLE TO STOP OR CONTROL WORRYING: SEVERAL DAYS
1. FEELING NERVOUS, ANXIOUS, OR ON EDGE: NEARLY EVERY DAY
GAD7 TOTAL SCORE: 9
3. WORRYING TOO MUCH ABOUT DIFFERENT THINGS: SEVERAL DAYS
7. FEELING AFRAID AS IF SOMETHING AWFUL MIGHT HAPPEN: NOT AT ALL
4. TROUBLE RELAXING: SEVERAL DAYS

## 2023-12-06 NOTE — PROGRESS NOTES
Collaborative Care (CoCM)  Progress Note    Type of Interaction: In Office    Start Time: 12:40 PM    End Time: 1:35 PM    Appointment: Scheduled    Reason for Visit:   Chief Complaint   Patient presents with    Follow-up      Interval History / Patient Symptoms:     Patient Health Questionnaire-9 Score: 10 (12/6/2023 12:55 PM)  ROJELIO-7 Total Score: 9 (12/6/2023 12:44 PM)    Interventions Provided: CBT, motivational interviewing    Progress Made: Slight    Response to Intervention: Worrying too much about health concerns; high frustration, low tolerance to change- Discussed patient's reaction to change and normalized the feelings that come with appointments that are changed and the confusion that can come of it.  Planning social outing with friend for after the holidays.    Plan:     Follow up in 2 weeks  Reminders that change can be challenging and being aware of response to change or adjusting schedules    Follow Up / Next Appointment: Next appointment: 12/20/23

## 2023-12-08 ENCOUNTER — OFFICE VISIT (OUTPATIENT)
Dept: HEMATOLOGY/ONCOLOGY | Facility: CLINIC | Age: 83
End: 2023-12-08
Payer: MEDICARE

## 2023-12-08 VITALS
RESPIRATION RATE: 16 BRPM | OXYGEN SATURATION: 93 % | HEIGHT: 64 IN | SYSTOLIC BLOOD PRESSURE: 141 MMHG | WEIGHT: 191.58 LBS | BODY MASS INDEX: 32.71 KG/M2 | HEART RATE: 94 BPM | TEMPERATURE: 97.2 F | DIASTOLIC BLOOD PRESSURE: 69 MMHG

## 2023-12-08 DIAGNOSIS — D72.829 LEUKOCYTOSIS, UNSPECIFIED TYPE: Primary | ICD-10-CM

## 2023-12-08 LAB
BASOPHILS # BLD AUTO: 0.02 X10*3/UL (ref 0–0.1)
BASOPHILS NFR BLD AUTO: 0.1 %
EOSINOPHIL # BLD AUTO: 0.12 X10*3/UL (ref 0–0.4)
EOSINOPHIL NFR BLD AUTO: 0.8 %
ERYTHROCYTE [DISTWIDTH] IN BLOOD BY AUTOMATED COUNT: 13.9 % (ref 11.5–14.5)
HCT VFR BLD AUTO: 39.4 % (ref 36–46)
HGB BLD-MCNC: 12.5 G/DL (ref 12–16)
IMM GRANULOCYTES # BLD AUTO: 0.07 X10*3/UL (ref 0–0.5)
IMM GRANULOCYTES NFR BLD AUTO: 0.5 % (ref 0–0.9)
LYMPHOCYTES # BLD AUTO: 9.39 X10*3/UL (ref 0.8–3)
LYMPHOCYTES NFR BLD AUTO: 60.6 %
MCH RBC QN AUTO: 30.4 PG (ref 26–34)
MCHC RBC AUTO-ENTMCNC: 31.7 G/DL (ref 32–36)
MCV RBC AUTO: 96 FL (ref 80–100)
MONOCYTES # BLD AUTO: 0.76 X10*3/UL (ref 0.05–0.8)
MONOCYTES NFR BLD AUTO: 4.9 %
NEUTROPHILS # BLD AUTO: 5.14 X10*3/UL (ref 1.6–5.5)
NEUTROPHILS NFR BLD AUTO: 33.1 %
NRBC BLD-RTO: ABNORMAL /100{WBCS}
PLATELET # BLD AUTO: 174 X10*3/UL (ref 150–450)
RBC # BLD AUTO: 4.11 X10*6/UL (ref 4–5.2)
RBC MORPH BLD: NORMAL
WBC # BLD AUTO: 15.5 X10*3/UL (ref 4.4–11.3)

## 2023-12-08 PROCEDURE — 85025 COMPLETE CBC W/AUTO DIFF WBC: CPT | Performed by: NURSE PRACTITIONER

## 2023-12-08 PROCEDURE — 1159F MED LIST DOCD IN RCRD: CPT | Performed by: NURSE PRACTITIONER

## 2023-12-08 PROCEDURE — 36415 COLL VENOUS BLD VENIPUNCTURE: CPT | Performed by: NURSE PRACTITIONER

## 2023-12-08 PROCEDURE — 99214 OFFICE O/P EST MOD 30 MIN: CPT | Performed by: NURSE PRACTITIONER

## 2023-12-08 PROCEDURE — 1160F RVW MEDS BY RX/DR IN RCRD: CPT | Performed by: NURSE PRACTITIONER

## 2023-12-08 PROCEDURE — 1126F AMNT PAIN NOTED NONE PRSNT: CPT | Performed by: NURSE PRACTITIONER

## 2023-12-08 PROCEDURE — 1036F TOBACCO NON-USER: CPT | Performed by: NURSE PRACTITIONER

## 2023-12-08 ASSESSMENT — PAIN SCALES - GENERAL: PAINLEVEL: 0-NO PAIN

## 2023-12-08 ASSESSMENT — PATIENT HEALTH QUESTIONNAIRE - PHQ9
2. FEELING DOWN, DEPRESSED OR HOPELESS: NOT AT ALL
SUM OF ALL RESPONSES TO PHQ9 QUESTIONS 1 AND 2: 0
1. LITTLE INTEREST OR PLEASURE IN DOING THINGS: NOT AT ALL

## 2023-12-08 ASSESSMENT — COLUMBIA-SUICIDE SEVERITY RATING SCALE - C-SSRS
1. IN THE PAST MONTH, HAVE YOU WISHED YOU WERE DEAD OR WISHED YOU COULD GO TO SLEEP AND NOT WAKE UP?: NO
6. HAVE YOU EVER DONE ANYTHING, STARTED TO DO ANYTHING, OR PREPARED TO DO ANYTHING TO END YOUR LIFE?: NO
2. HAVE YOU ACTUALLY HAD ANY THOUGHTS OF KILLING YOURSELF?: NO

## 2023-12-08 NOTE — PATIENT INSTRUCTIONS
Diana had an appointment today with Kathryn for Leukocytosis  Wbc--15.5 today  Diana will return in 4mons, please arrive 15mins early for lab work

## 2023-12-08 NOTE — PROGRESS NOTES
Patient ID: Diana Azul is a 83 y.o. female.  Referring Physician: No referring provider defined for this encounter.  Primary Care Provider: Marilee Yao DO  Visit Type: Follow Up      Subjective    Location:  [facility] [place]  Initial consult: 3/13/2023  Reason: transient Leukocytosis diagnosed with CLL     Follow up visit  Patient is a 82 yo woman with a PMH of depression, chronic constipation, abdominal bloating, recent trouble with memory  referred to benign hematology for leukocytosis (Neutrophilia & Lymphocytosis) by Dr Marilee Yao.     She has had UTI's in the past and states that she was hospitalized at one time for a severe UTI in the recent past. Denies abnormal weight loss, night sweats, fever. Denies fatigue, chills, sob, cp, n/v/d, n/t. No PICA. Denies any abnormal bleeding or  bruising. No recurrent infections or lymphadenopathy. No joint/body pain. No known blood disorders in family. Has had surgery in past w/o issue. Never had blood/blood products. Denies NSAID use. The patient is accompanied by her daughter today. She admits  that she has been down in the dumps lately and is seeing a counselor for depression. She feels that her lack of exercise and sedentary life style has contributed to weight gain. Her   from pancreatic cancer 5 years ago, and she has had a hard  time dealing with her grief since that time.      Past Medical History : as above, also  ? 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 Dysfunction of both eustachian tubes (381.81) (H69.83)  ? Elevated BP without diagnosis of hypertension (796.2) (R03.0)  ? Elevated glucose (790.29) (R73.09)  ? Esophageal reflux (530.81) (K21.9)  ? GERD (gastroesophageal reflux disease) (530.81) (K21.9)  ? Hiatal hernia (553.3) (K44.9)  ? Hyperlipidemia (272.4) (E78.5)  ? Leukocytosis (288.60) (D72.829)  ? Low back pain (724.2) (M54.50)  ? Memory deficit (780.93) (R41.3)  ? 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 Mother  at 82 from MI.  patient has 3 daughters and 2 sons. 1 daughter had breast cancer but in remission  ? Family history of congenital heart disease (V19.5) (Z82.79)  ? Family history of myocardial infarction (V17.3) (Z82.49)  ? Family history of congenital heart disease (V19.5) (Z82.79)     Social History:  lives alone. Daughter is main social support  ? Never used tobacco (V49.89) (Z78.9)  ? Social alcohol use (Z78.9)       Family History: No Family History items are recorded  in the problem list.      Social History:   Social Substance History:  ·  Smoking Status never smoker (1)   ·  Tobacco Use denies   ·  Alcohol Use occasionally   ·  Drug Use denies       Review of Systems   All other systems reviewed and are negative.       Objective   BSA: 1.98 meters squared  /69 (BP Location: Left arm, Patient Position: Sitting, BP Cuff Size: Adult)   Pulse 94   Temp 36.2 °C (97.2 °F) (Temporal)   Resp 16   Ht 1.626 m (5' 4.02\")   Wt 86.9 kg (191 lb 9.3 oz)   SpO2 93%   BMI 32.87 kg/m²      has a past medical history of Personal history of other mental and behavioral disorders (10/03/2013).   has no past surgical history on file.  Family History   Problem Relation Name Age of Onset    Heart disease Father          congenital    Heart attack Father      Heart disease Brother          congenital     Oncology History    No history exists.       Diana Azul  reports that she has never smoked. She has never used smokeless tobacco.  She  reports current alcohol use.  She  reports no history of drug use.    Physical Exam  HENT:      Head: Normocephalic.      Nose: Nose normal.      Mouth/Throat:      Mouth: Mucous membranes are moist.   Eyes:      Pupils: Pupils are equal, " round, and reactive to light.   Cardiovascular:      Rate and Rhythm: Normal rate and regular rhythm.      Pulses: Normal pulses.      Heart sounds: Normal heart sounds.   Pulmonary:      Effort: Pulmonary effort is normal.      Breath sounds: Normal breath sounds.   Abdominal:      General: Bowel sounds are normal.      Palpations: Abdomen is soft.   Musculoskeletal:         General: Normal range of motion.   Skin:     General: Skin is warm and dry.   Neurological:      General: No focal deficit present.      Mental Status: She is alert and oriented to person, place, and time.   Psychiatric:         Mood and Affect: Mood normal.         Behavior: Behavior normal.         WBC   Date/Time Value Ref Range Status   12/08/2023 08:56 AM 15.5 (H) 4.4 - 11.3 x10*3/uL Final   10/26/2023 11:20 AM 14.1 (H) 4.4 - 11.3 x10*3/uL Final   09/12/2023 11:16 AM 11.3 4.4 - 11.3 x10E9/L Final   07/25/2023 12:12 PM 15.0 (H) 4.4 - 11.3 x10E9/L Final   06/19/2023 09:20 AM 13.5 (H) 4.4 - 11.3 x10E9/L Final     nRBC   Date Value Ref Range Status   12/08/2023   Final     Comment:     Not Measured   10/26/2023 0.0 0.0 - 0.0 /100 WBCs Final   09/12/2023 0.0 0.0 - 0.0 /100 WBC Final   07/25/2023 0.0 0.0 - 0.0 /100 WBC Final   06/19/2023 CANCELED       Comment:     Result canceled by the ancillary.     RBC   Date Value Ref Range Status   12/08/2023 4.11 4.00 - 5.20 x10*6/uL Final   10/26/2023 3.97 (L) 4.00 - 5.20 x10*6/uL Final   09/12/2023 4.07 4.00 - 5.20 x10E12/L Final   07/25/2023 3.99 (L) 4.00 - 5.20 x10E12/L Final   06/19/2023 3.85 (L) 4.00 - 5.20 x10E12/L Final     Hemoglobin   Date Value Ref Range Status   12/08/2023 12.5 12.0 - 16.0 g/dL Final   10/26/2023 11.9 (L) 12.0 - 16.0 g/dL Final   09/12/2023 12.2 12.0 - 16.0 g/dL Final   07/25/2023 12.0 12.0 - 16.0 g/dL Final   06/19/2023 11.7 (L) 12.0 - 16.0 g/dL Final     Hematocrit   Date Value Ref Range Status   12/08/2023 39.4 36.0 - 46.0 % Final   10/26/2023 37.9 36.0 - 46.0 % Final    09/12/2023 38.6 36.0 - 46.0 % Final   07/25/2023 38.1 36.0 - 46.0 % Final   06/19/2023 36.1 36.0 - 46.0 % Final     MCV   Date/Time Value Ref Range Status   12/08/2023 08:56 AM 96 80 - 100 fL Final   10/26/2023 11:20 AM 96 80 - 100 fL Final   09/12/2023 11:16 AM 95 80 - 100 fL Final   07/25/2023 12:12 PM 95 80 - 100 fL Final   06/19/2023 09:20 AM 94 80 - 100 fL Final     MCH   Date/Time Value Ref Range Status   12/08/2023 08:56 AM 30.4 26.0 - 34.0 pg Final   10/26/2023 11:20 AM 30.0 26.0 - 34.0 pg Final     MCHC   Date/Time Value Ref Range Status   12/08/2023 08:56 AM 31.7 (L) 32.0 - 36.0 g/dL Final   10/26/2023 11:20 AM 31.4 (L) 32.0 - 36.0 g/dL Final   09/12/2023 11:16 AM 31.6 (L) 32.0 - 36.0 g/dL Final   07/25/2023 12:12 PM 31.5 (L) 32.0 - 36.0 g/dL Final   06/19/2023 09:20 AM 32.4 32.0 - 36.0 g/dL Final     RDW   Date/Time Value Ref Range Status   12/08/2023 08:56 AM 13.9 11.5 - 14.5 % Final   10/26/2023 11:20 AM 14.2 11.5 - 14.5 % Final   09/12/2023 11:16 AM 14.1 11.5 - 14.5 % Final   07/25/2023 12:12 PM 14.2 11.5 - 14.5 % Final   06/19/2023 09:20 AM 14.0 11.5 - 14.5 % Final     Platelets   Date/Time Value Ref Range Status   12/08/2023 08:56  150 - 450 x10*3/uL Final   10/26/2023 11:20  150 - 450 x10*3/uL Final   09/12/2023 11:16  150 - 450 x10E9/L Final   07/25/2023 12:12  150 - 450 x10E9/L Final   06/19/2023 09:20  150 - 450 x10E9/L Final     MPV   Date/Time Value Ref Range Status   10/26/2023 11:20 AM 12.2 (H) 7.5 - 11.5 fL Final     Neutrophils %   Date/Time Value Ref Range Status   12/08/2023 08:56 AM 33.1 40.0 - 80.0 % Final   10/26/2023 11:20 AM 37.4 40.0 - 80.0 % Final   07/25/2023 12:12 PM 41.9 40.0 - 80.0 % Final   06/19/2023 09:20 AM 35.2 40.0 - 80.0 % Final   06/19/2023 09:19 AM CANCELED       Comment:     Result canceled by the ancillary.     Immature Granulocytes %, Automated   Date/Time Value Ref Range Status   12/08/2023 08:56 AM 0.5 0.0 - 0.9 % Final     Comment:      Immature Granulocyte Count (IG) includes promyelocytes, myelocytes and metamyelocytes but does not include bands. Percent differential counts (%) should be interpreted in the context of the absolute cell counts (cells/UL).   10/26/2023 11:20 AM 0.4 0.0 - 0.9 % Final     Comment:     Immature Granulocyte Count (IG) includes promyelocytes, myelocytes and metamyelocytes but does not include bands. Percent differential counts (%) should be interpreted in the context of the absolute cell counts (cells/UL).   07/25/2023 12:12 PM 0.5 0.0 - 0.9 % Final     Comment:      Immature Granulocyte Count (IG) includes promyelocytes,    myelocytes and metamyelocytes but does not include bands.   Percent differential counts (%) should be interpreted in the   context of the absolute cell counts (cells/L).     06/19/2023 09:20 AM 0.3 0.0 - 0.9 % Final     Comment:      Immature Granulocyte Count (IG) includes promyelocytes,    myelocytes and metamyelocytes but does not include bands.   Percent differential counts (%) should be interpreted in the   context of the absolute cell counts (cells/L).     06/19/2023 09:19 AM CANCELED       Comment:      Immature Granulocyte Count (IG) includes promyelocytes,    myelocytes and metamyelocytes but does not include bands.   Percent differential counts (%) should be interpreted in the   context of the absolute cell counts (cells/L).    Result canceled by the ancillary.       Lymphocytes %   Date/Time Value Ref Range Status   12/08/2023 08:56 AM 60.6 13.0 - 44.0 % Final   10/26/2023 11:20 AM 54.9 13.0 - 44.0 % Final   07/25/2023 12:12 PM 51.9 13.0 - 44.0 % Final   06/19/2023 09:20 AM 54.1 13.0 - 44.0 % Final   06/19/2023 09:19 AM CANCELED       Comment:     Result canceled by the ancillary.   06/12/2023 11:09 AM 53.4 13.0 - 44.0 % Final   12/06/2022 02:20 PM 38.3 13.0 - 44.0 % Final     Monocytes %   Date/Time Value Ref Range Status   12/08/2023 08:56 AM 4.9 2.0 - 10.0 % Final   10/26/2023 11:20  AM 5.8 2.0 - 10.0 % Final   07/25/2023 12:12 PM 4.5 2.0 - 10.0 % Final   06/19/2023 09:20 AM 9.1 2.0 - 10.0 % Final   06/19/2023 09:19 AM CANCELED       Comment:     Result canceled by the ancillary.   06/12/2023 11:09 AM 4.3 2.0 - 10.0 % Final   12/06/2022 02:20 PM 5.2 2.0 - 10.0 % Final     Eosinophils %   Date/Time Value Ref Range Status   12/08/2023 08:56 AM 0.8 0.0 - 6.0 % Final   10/26/2023 11:20 AM 1.1 0.0 - 6.0 % Final   07/25/2023 12:12 PM 0.9 0.0 - 6.0 % Final   06/19/2023 09:20 AM 1.0 0.0 - 6.0 % Final   06/19/2023 09:19 AM CANCELED       Comment:     Result canceled by the ancillary.   06/12/2023 11:09 AM 1.7 0.0 - 6.0 % Final   12/06/2022 02:20 PM 0.9 0.0 - 6.0 % Final     Basophils %   Date/Time Value Ref Range Status   12/08/2023 08:56 AM 0.1 0.0 - 2.0 % Final   10/26/2023 11:20 AM 0.4 0.0 - 2.0 % Final   07/25/2023 12:12 PM 0.3 0.0 - 2.0 % Final   06/19/2023 09:20 AM 0.3 0.0 - 2.0 % Final   06/19/2023 09:19 AM CANCELED       Comment:     Result canceled by the ancillary.   06/12/2023 11:09 AM 0.9 0.0 - 2.0 % Final   12/06/2022 02:20 PM 0.0 0.0 - 2.0 % Final     Neutrophils Absolute   Date/Time Value Ref Range Status   12/08/2023 08:56 AM 5.14 1.60 - 5.50 x10*3/uL Final     Comment:     Percent differential counts (%) should be interpreted in the context of the absolute cell counts (cells/uL).   10/26/2023 11:20 AM 5.27 1.60 - 5.50 x10*3/uL Final     Comment:     Percent differential counts (%) should be interpreted in the context of the absolute cell counts (cells/uL).   07/25/2023 12:12 PM 6.28 (H) 1.60 - 5.50 x10E9/L Final   06/19/2023 09:20 AM 4.74 1.60 - 5.50 x10E9/L Final   06/19/2023 09:19 AM CANCELED       Comment:     Result canceled by the ancillary.     Immature Granulocytes Absolute, Automated   Date/Time Value Ref Range Status   12/08/2023 08:56 AM 0.07 0.00 - 0.50 x10*3/uL Final   10/26/2023 11:20 AM 0.05 0.00 - 0.50 x10*3/uL Final     Lymphocytes Absolute   Date/Time Value Ref Range  Status   12/08/2023 08:56 AM 9.39 (H) 0.80 - 3.00 x10*3/uL Final   10/26/2023 11:20 AM 7.72 (H) 0.80 - 3.00 x10*3/uL Final   07/25/2023 12:12 PM 7.76 (H) 0.80 - 3.00 x10E9/L Final   06/19/2023 09:20 AM 7.31 (H) 0.80 - 3.00 x10E9/L Final   06/19/2023 09:19 AM CANCELED       Comment:     Result canceled by the ancillary.     Monocytes Absolute   Date/Time Value Ref Range Status   12/08/2023 08:56 AM 0.76 0.05 - 0.80 x10*3/uL Final   10/26/2023 11:20 AM 0.82 (H) 0.05 - 0.80 x10*3/uL Final   07/25/2023 12:12 PM 0.67 0.05 - 0.80 x10E9/L Final   06/19/2023 09:20 AM 1.23 (H) 0.05 - 0.80 x10E9/L Final   06/19/2023 09:19 AM CANCELED       Comment:     Result canceled by the ancillary.     Eosinophils Absolute   Date/Time Value Ref Range Status   12/08/2023 08:56 AM 0.12 0.00 - 0.40 x10*3/uL Final   10/26/2023 11:20 AM 0.16 0.00 - 0.40 x10*3/uL Final   07/25/2023 12:12 PM 0.14 0.00 - 0.40 x10E9/L Final   06/19/2023 09:20 AM 0.14 0.00 - 0.40 x10E9/L Final   06/19/2023 09:19 AM CANCELED       Comment:     Result canceled by the ancillary.   06/12/2023 11:09 AM 0.23 0.00 - 0.40 x10E9/L Final   12/06/2022 02:20 PM 0.11 0.00 - 0.40 x10E9/L Final     Basophils Absolute   Date/Time Value Ref Range Status   12/08/2023 08:56 AM 0.02 0.00 - 0.10 x10*3/uL Final     Comment:     Automated WBC differential has been confirmed by manual smear.   10/26/2023 11:20 AM 0.05 0.00 - 0.10 x10*3/uL Final   07/25/2023 12:12 PM 0.04 0.00 - 0.10 x10E9/L Final   06/19/2023 09:20 AM 0.04 0.00 - 0.10 x10E9/L Final   06/19/2023 09:19 AM CANCELED       Comment:     Result canceled by the ancillary.   06/12/2023 11:09 AM 0.12 (H) 0.00 - 0.10 x10E9/L Final   12/06/2022 02:20 PM 0.00 0.00 - 0.10 x10E9/L Final           Assessment/Plan    82 yo woman with a PMH of depression, chronic constipation, abdominal bloating, recent trouble with memory  referred to benign hematology for leukocytosis (neutrophilia)  by Dr Marilee Yao. Review of her labwork noted a  transient leukocytosis since May 2020. Only one diff noted elevation of both neutrophils and lymphocytes. The patient notes that she had a UTI this past December and was hospitalized and this correlates  with December elevation.      CBC date/time       WBC     HGB     HCT     PLT     Neut      12-Sep-2023 11:16   11.3    12.2    38.6    175     N/A  25-Jul-2023 12:12   15.0(H) 12.0    38.1    210     6.28(H)  19-Jun-2023 09:20   13.5(H) 11.7(L) 36.1    201     4.74  13-Mar-2023 10:16   13.3(H) 12.0    36.7    185     6.19(H)  06-Dec-2022 14:20   12.7(H) 12.4    39.9    212     N/A  03-Oct-2022 09:38   14.5(H) 12.7    40.2    211     N/A        At the first visit we discussed the various possible causes of leukocytosis include infection, inflammation, steroid or other medication induced, smoking, and also heme malignancy. My suspicion was low for CML or CLL since both cell lines are elevated.  However we proceeded with peripheral flow cytometry we did note that she has a clonal population with a CLL/SLL-like phonotype.     CD5+ B-cell clonal population with a chronic lymphocytic leukemia/small lymphocytic lymphoma (CLL/SLL)-like phenotype.  No discrete blast population identified.  No abnormality of granulocytes or monocytes noted.    The monoclonal B-cell count is estimated at 3.3 x 10E9/L. While the clonal B-cell population has a phenotype consistent with CLL/SLL, in the absence of   lymphadenopathy, organomegaly or extramedullary disease, a clonal population less than 5x10E9/L is consistent with monoclonal B-cell lymphocytosis (MBL).   Of note, there is heterogeneity of the CD5+ B-cell population, with a subpopulation showing dimmer expression of CD19, CD45, and negative for sKappa/sLambda, and the other subpopulation with higher level of expression of CD19 and CD45, being dimly  positive  for CD25 and sLambda+. It is unclear if this represent two separate clonal processes or heterogeneity of a single clonal  "disorder (favored, as both are CD5+/CD23+). Clinical and morphologic correlation is suggested.         We discussed labwork today. She is stable and does not require any tup of treatment at this time. The patient does state that she fell at the ball game and is sore, but no serious injuries.. Given her diagnosis, I would recommend watchful waiting since  the patient has no \"B symptoms\" or other abnormal findings in the differential. Her spleen is normal size. Several months ago, we discussed with the patient and her daughter that she has a very early stage blood cancer and we would like to follow labwork every  3-4 months. If nothing changes we will increase the interval for labwork.       PLAN:  1. Patient will return in 4 months for CBC/diff, CMP with in person visit.   2. She should call in the interim if questions, problems or new symptoms arise.  3. Patient should follow up with PCP and therapist for non-hematology issues.     I had an extensive discussion with the patient regarding the diagnosis and discussed the plan of therapy, including general considerations regarding side effects and outcomes. Pt understood and gave appropriate teach back about the plan of care. All questions  were answered to the patient's satisfaction. The patient is instructed to contact us at any time if questions or problems arise. Thank you for the opportunity to participate in the care of this very pleasant patient.                  Rosanne M Casal, APRN-CNP, DNP                         "

## 2023-12-11 ENCOUNTER — APPOINTMENT (OUTPATIENT)
Dept: HEMATOLOGY/ONCOLOGY | Facility: CLINIC | Age: 83
End: 2023-12-11
Payer: MEDICARE

## 2023-12-11 DIAGNOSIS — K21.9 GASTROESOPHAGEAL REFLUX DISEASE WITHOUT ESOPHAGITIS: ICD-10-CM

## 2023-12-20 ENCOUNTER — SOCIAL WORK (OUTPATIENT)
Dept: PRIMARY CARE | Facility: CLINIC | Age: 83
End: 2023-12-20
Payer: MEDICARE

## 2023-12-20 DIAGNOSIS — F32.4 MAJOR DEPRESSIVE DISORDER IN PARTIAL REMISSION, UNSPECIFIED WHETHER RECURRENT (CMS-HCC): Primary | ICD-10-CM

## 2023-12-20 ASSESSMENT — PATIENT HEALTH QUESTIONNAIRE - PHQ9
10. IF YOU CHECKED OFF ANY PROBLEMS, HOW DIFFICULT HAVE THESE PROBLEMS MADE IT FOR YOU TO DO YOUR WORK, TAKE CARE OF THINGS AT HOME, OR GET ALONG WITH OTHER PEOPLE: SOMEWHAT DIFFICULT
SUM OF ALL RESPONSES TO PHQ QUESTIONS 1-9: 7
SUM OF ALL RESPONSES TO PHQ9 QUESTIONS 1 & 2: 3
3. TROUBLE FALLING OR STAYING ASLEEP: NOT AT ALL
5. POOR APPETITE OR OVEREATING: NOT AT ALL
1. LITTLE INTEREST OR PLEASURE IN DOING THINGS: MORE THAN HALF THE DAYS
7. TROUBLE CONCENTRATING ON THINGS, SUCH AS READING THE NEWSPAPER OR WATCHING TELEVISION: MORE THAN HALF THE DAYS
4. FEELING TIRED OR HAVING LITTLE ENERGY: MORE THAN HALF THE DAYS
9. THOUGHTS THAT YOU WOULD BE BETTER OFF DEAD, OR OF HURTING YOURSELF: NOT AT ALL
2. FEELING DOWN, DEPRESSED OR HOPELESS: SEVERAL DAYS
8. MOVING OR SPEAKING SO SLOWLY THAT OTHER PEOPLE COULD HAVE NOTICED. OR THE OPPOSITE, BEING SO FIGETY OR RESTLESS THAT YOU HAVE BEEN MOVING AROUND A LOT MORE THAN USUAL: NOT AT ALL
6. FEELING BAD ABOUT YOURSELF - OR THAT YOU ARE A FAILURE OR HAVE LET YOURSELF OR YOUR FAMILY DOWN: NOT AT ALL

## 2023-12-20 ASSESSMENT — ANXIETY QUESTIONNAIRES
4. TROUBLE RELAXING: SEVERAL DAYS
7. FEELING AFRAID AS IF SOMETHING AWFUL MIGHT HAPPEN: NOT AT ALL
2. NOT BEING ABLE TO STOP OR CONTROL WORRYING: SEVERAL DAYS
5. BEING SO RESTLESS THAT IT IS HARD TO SIT STILL: NOT AT ALL
3. WORRYING TOO MUCH ABOUT DIFFERENT THINGS: SEVERAL DAYS
GAD7 TOTAL SCORE: 5
IF YOU CHECKED OFF ANY PROBLEMS ON THIS QUESTIONNAIRE, HOW DIFFICULT HAVE THESE PROBLEMS MADE IT FOR YOU TO DO YOUR WORK, TAKE CARE OF THINGS AT HOME, OR GET ALONG WITH OTHER PEOPLE: SOMEWHAT DIFFICULT
1. FEELING NERVOUS, ANXIOUS, OR ON EDGE: MORE THAN HALF THE DAYS
6. BECOMING EASILY ANNOYED OR IRRITABLE: NOT AT ALL

## 2023-12-20 NOTE — PROGRESS NOTES
Collaborative Care (CoC)  Progress Note    Type of Interaction: In Office    Start Time: 12:55 PM    End Time: 1:43 PM    Appointment: Scheduled    Reason for Visit:   Chief Complaint   Patient presents with    Follow-up      Interval History / Patient Symptoms:   Subjective   Diana Azul is a 83 y.o. female who presents for follow up for Collaborative Care services.     Patient Health Questionnaire-9 Score: 7 (12/20/2023  1:00 PM)  ROJELIO-7 Total Score: 5 (12/20/2023 12:55 PM)     PHQ score is decreased from previous session.   ROJELIO score is decreased from previous session.    Interventions Provided: Motivational Interviewing    Progress Made: Slight    Response to Intervention:   We discussed the following elements from the last appointment: Patient's functioning since last appointment- holiday stress and family visiting     We addressed the following components during appointment: Educated on how long it takes to create a habit/routine- 2-8 months.  Feeling more depressed this past month- feels like she is drinking more wine lately and not socializing with friends- feels she doesn't have the finances to go out.  Looked at modifying behaviors around drinking less wine and exercising more- and holding self accountable to make the changes.    Plan:   Hold self accountable to make changes needed.    Follow Up / Next Appointment: Next appointment: 01/03/24

## 2023-12-27 RX ORDER — FAMOTIDINE 40 MG/1
40 TABLET, FILM COATED ORAL 2 TIMES DAILY
Qty: 180 TABLET | Refills: 1 | Status: SHIPPED | OUTPATIENT
Start: 2023-12-27

## 2023-12-29 ENCOUNTER — DOCUMENTATION (OUTPATIENT)
Dept: PRIMARY CARE | Facility: CLINIC | Age: 83
End: 2023-12-29
Payer: MEDICARE

## 2023-12-29 DIAGNOSIS — F32.4 MAJOR DEPRESSIVE DISORDER IN PARTIAL REMISSION, UNSPECIFIED WHETHER RECURRENT (CMS-HCC): Primary | ICD-10-CM

## 2023-12-29 PROCEDURE — 99493 SBSQ PSYC COLLAB CARE MGMT: CPT | Performed by: FAMILY MEDICINE

## 2023-12-29 PROCEDURE — 99494 1ST/SBSQ PSYC COLLAB CARE: CPT | Performed by: FAMILY MEDICINE

## 2024-01-03 ENCOUNTER — SOCIAL WORK (OUTPATIENT)
Dept: PRIMARY CARE | Facility: CLINIC | Age: 84
End: 2024-01-03
Payer: MEDICARE

## 2024-01-03 DIAGNOSIS — F32.4 MAJOR DEPRESSIVE DISORDER IN PARTIAL REMISSION, UNSPECIFIED WHETHER RECURRENT (CMS-HCC): Primary | ICD-10-CM

## 2024-01-03 ASSESSMENT — ANXIETY QUESTIONNAIRES
IF YOU CHECKED OFF ANY PROBLEMS ON THIS QUESTIONNAIRE, HOW DIFFICULT HAVE THESE PROBLEMS MADE IT FOR YOU TO DO YOUR WORK, TAKE CARE OF THINGS AT HOME, OR GET ALONG WITH OTHER PEOPLE: SOMEWHAT DIFFICULT
6. BECOMING EASILY ANNOYED OR IRRITABLE: NOT AT ALL
GAD7 TOTAL SCORE: 2
5. BEING SO RESTLESS THAT IT IS HARD TO SIT STILL: NOT AT ALL
1. FEELING NERVOUS, ANXIOUS, OR ON EDGE: MORE THAN HALF THE DAYS
7. FEELING AFRAID AS IF SOMETHING AWFUL MIGHT HAPPEN: NOT AT ALL
2. NOT BEING ABLE TO STOP OR CONTROL WORRYING: NOT AT ALL
3. WORRYING TOO MUCH ABOUT DIFFERENT THINGS: NOT AT ALL
4. TROUBLE RELAXING: NOT AT ALL

## 2024-01-03 ASSESSMENT — PATIENT HEALTH QUESTIONNAIRE - PHQ9
5. POOR APPETITE OR OVEREATING: SEVERAL DAYS
1. LITTLE INTEREST OR PLEASURE IN DOING THINGS: SEVERAL DAYS
10. IF YOU CHECKED OFF ANY PROBLEMS, HOW DIFFICULT HAVE THESE PROBLEMS MADE IT FOR YOU TO DO YOUR WORK, TAKE CARE OF THINGS AT HOME, OR GET ALONG WITH OTHER PEOPLE: SOMEWHAT DIFFICULT
4. FEELING TIRED OR HAVING LITTLE ENERGY: SEVERAL DAYS
2. FEELING DOWN, DEPRESSED OR HOPELESS: SEVERAL DAYS
6. FEELING BAD ABOUT YOURSELF - OR THAT YOU ARE A FAILURE OR HAVE LET YOURSELF OR YOUR FAMILY DOWN: NOT AT ALL
SUM OF ALL RESPONSES TO PHQ9 QUESTIONS 1 & 2: 2
3. TROUBLE FALLING OR STAYING ASLEEP: NOT AT ALL
9. THOUGHTS THAT YOU WOULD BE BETTER OFF DEAD, OR OF HURTING YOURSELF: NOT AT ALL
SUM OF ALL RESPONSES TO PHQ QUESTIONS 1-9: 5
8. MOVING OR SPEAKING SO SLOWLY THAT OTHER PEOPLE COULD HAVE NOTICED. OR THE OPPOSITE, BEING SO FIGETY OR RESTLESS THAT YOU HAVE BEEN MOVING AROUND A LOT MORE THAN USUAL: NOT AT ALL
7. TROUBLE CONCENTRATING ON THINGS, SUCH AS READING THE NEWSPAPER OR WATCHING TELEVISION: SEVERAL DAYS

## 2024-01-03 NOTE — PROGRESS NOTES
Collaborative Care (CoCM)  Progress Note    Type of Interaction: In Office    Start Time: 11:08 AM    End Time: 11:44 AM    Appointment: Scheduled    Reason for Visit:   Chief Complaint   Patient presents with    Follow-up      Interval History / Patient Symptoms:   Subjective   Diana Azul is a 83 y.o. female who presents for follow up for Collaborative Care services. Current symptoms include continuing overall depression and anxiety over health diagnoses.      General: alert and oriented, in no acute distress  Affect and Behavior: More repetitive with stories noted    Patient Health Questionnaire-9 Score: 5 (1/3/2024 11:25 AM)  ROJELIO-7 Total Score: 2 (1/3/2024 11:24 AM)     PHQ score is decreased from previous session.   ROJELIO score is decreased from previous session.    Interventions Provided: CBT    Progress Made: Slight    Response to Intervention:   We discussed the following elements from the last appointment: Hasn't had any wine and was dx with bronchitis    We addressed the following components during appointment:   Pt reported Doing well with not drinking wine; wants to exercise 1-2 days/week    Plan:   Try to exercise 1-2 days/week once feeling better from bronchitis    Follow Up / Next Appointment: Next appointment: 01/23/24

## 2024-01-23 ENCOUNTER — SOCIAL WORK (OUTPATIENT)
Dept: PRIMARY CARE | Facility: CLINIC | Age: 84
End: 2024-01-23
Payer: MEDICARE

## 2024-01-23 ENCOUNTER — OFFICE VISIT (OUTPATIENT)
Dept: PRIMARY CARE | Facility: CLINIC | Age: 84
End: 2024-01-23
Payer: MEDICARE

## 2024-01-23 VITALS
OXYGEN SATURATION: 95 % | HEART RATE: 86 BPM | WEIGHT: 186 LBS | BODY MASS INDEX: 31.91 KG/M2 | DIASTOLIC BLOOD PRESSURE: 78 MMHG | SYSTOLIC BLOOD PRESSURE: 132 MMHG

## 2024-01-23 DIAGNOSIS — F41.9 ANXIETY DISORDER, UNSPECIFIED: ICD-10-CM

## 2024-01-23 DIAGNOSIS — F32.4 MAJOR DEPRESSIVE DISORDER, SINGLE EPISODE, IN PARTIAL REMISSION (CMS-HCC): ICD-10-CM

## 2024-01-23 DIAGNOSIS — Z00.00 MEDICARE ANNUAL WELLNESS VISIT, SUBSEQUENT: Primary | ICD-10-CM

## 2024-01-23 DIAGNOSIS — E03.9 ADULT HYPOTHYROIDISM: ICD-10-CM

## 2024-01-23 DIAGNOSIS — F33.41 RECURRENT MAJOR DEPRESSIVE DISORDER, IN PARTIAL REMISSION (CMS-HCC): ICD-10-CM

## 2024-01-23 DIAGNOSIS — R41.3 MEMORY CHANGE: ICD-10-CM

## 2024-01-23 DIAGNOSIS — K21.9 GASTRO-ESOPHAGEAL REFLUX DISEASE WITHOUT ESOPHAGITIS: ICD-10-CM

## 2024-01-23 DIAGNOSIS — F32.4 MAJOR DEPRESSIVE DISORDER IN PARTIAL REMISSION, UNSPECIFIED WHETHER RECURRENT (CMS-HCC): Primary | ICD-10-CM

## 2024-01-23 PROCEDURE — 1157F ADVNC CARE PLAN IN RCRD: CPT | Performed by: FAMILY MEDICINE

## 2024-01-23 PROCEDURE — 1126F AMNT PAIN NOTED NONE PRSNT: CPT | Performed by: FAMILY MEDICINE

## 2024-01-23 PROCEDURE — 1159F MED LIST DOCD IN RCRD: CPT | Performed by: FAMILY MEDICINE

## 2024-01-23 PROCEDURE — 1123F ACP DISCUSS/DSCN MKR DOCD: CPT | Performed by: FAMILY MEDICINE

## 2024-01-23 PROCEDURE — G0439 PPPS, SUBSEQ VISIT: HCPCS | Performed by: FAMILY MEDICINE

## 2024-01-23 PROCEDURE — 1158F ADVNC CARE PLAN TLK DOCD: CPT | Performed by: FAMILY MEDICINE

## 2024-01-23 PROCEDURE — 99214 OFFICE O/P EST MOD 30 MIN: CPT | Performed by: FAMILY MEDICINE

## 2024-01-23 PROCEDURE — 1036F TOBACCO NON-USER: CPT | Performed by: FAMILY MEDICINE

## 2024-01-23 PROCEDURE — 1170F FXNL STATUS ASSESSED: CPT | Performed by: FAMILY MEDICINE

## 2024-01-23 PROCEDURE — 1160F RVW MEDS BY RX/DR IN RCRD: CPT | Performed by: FAMILY MEDICINE

## 2024-01-23 RX ORDER — OMEPRAZOLE 40 MG/1
40 CAPSULE, DELAYED RELEASE ORAL DAILY
Qty: 90 CAPSULE | Refills: 1 | Status: SHIPPED | OUTPATIENT
Start: 2024-01-23

## 2024-01-23 RX ORDER — LEVOTHYROXINE SODIUM 25 UG/1
25 TABLET ORAL DAILY
Qty: 90 TABLET | Refills: 1 | Status: SHIPPED | OUTPATIENT
Start: 2024-01-23

## 2024-01-23 RX ORDER — CITALOPRAM 20 MG/1
20 TABLET, FILM COATED ORAL DAILY
Qty: 100 TABLET | Refills: 1 | Status: SHIPPED | OUTPATIENT
Start: 2024-01-23

## 2024-01-23 ASSESSMENT — LIFESTYLE VARIABLES
AUDIT-C TOTAL SCORE: 4
HOW OFTEN DO YOU HAVE SIX OR MORE DRINKS ON ONE OCCASION: NEVER
SKIP TO QUESTIONS 9-10: 1
HOW MANY STANDARD DRINKS CONTAINING ALCOHOL DO YOU HAVE ON A TYPICAL DAY: 1 OR 2
HOW OFTEN DO YOU HAVE A DRINK CONTAINING ALCOHOL: 4 OR MORE TIMES A WEEK

## 2024-01-23 ASSESSMENT — ACTIVITIES OF DAILY LIVING (ADL)
BATHING: INDEPENDENT
MANAGING_FINANCES: NEEDS ASSISTANCE
GROCERY_SHOPPING: INDEPENDENT
DRESSING: INDEPENDENT
TAKING_MEDICATION: INDEPENDENT
DOING_HOUSEWORK: INDEPENDENT

## 2024-01-23 ASSESSMENT — ENCOUNTER SYMPTOMS
DEPRESSION: 0
OCCASIONAL FEELINGS OF UNSTEADINESS: 0
LOSS OF SENSATION IN FEET: 0

## 2024-01-23 NOTE — PROGRESS NOTES
Subjective   Patient ID: iDana Azul is a 83 y.o. female who presents for Follow-up and Medicare Annual Wellness Visit Subsequent.    HPI    DEPRESSION & ANXIETY: stable on current regimen  Noticing that she is not sleeping as well  Waking when she hears sounds  Thinking that she si sleeping in her bed with her sister like when they were children.  Soemtimes hears movement above - she lives on 3rd floor of her complex - no attic or 4 th floor, she has woken and felt like her brother was walking on floor above her.    Working with Chrissy in our Behavioral Health Collaborative Care Team regularly.  She admittedly has not been getting out to social events as discussed in previous visits.    PHQ-9: 8    Review of Systems    Review of Systems negative except as noted in HPI and Chief complaint.     Objective     Patient Health Questionnaire-9 Score: 8             /78 (BP Location: Left arm, Patient Position: Standing, BP Cuff Size: Large adult)   Pulse 86   Wt 84.4 kg (186 lb)   SpO2 95%   BMI 31.91 kg/m²      Physical Exam  Constitutional:       General: She is not in acute distress.     Appearance: Normal appearance. She is not ill-appearing.   HENT:      Head: Normocephalic and atraumatic.   Neck:      Vascular: No carotid bruit.   Cardiovascular:      Rate and Rhythm: Normal rate and regular rhythm.      Pulses: Normal pulses.      Heart sounds: Normal heart sounds. No murmur heard.     No gallop.   Pulmonary:      Effort: Pulmonary effort is normal.      Breath sounds: Normal breath sounds. No wheezing, rhonchi or rales.   Musculoskeletal:      Cervical back: Normal range of motion and neck supple. No rigidity or tenderness.   Lymphadenopathy:      Cervical: No cervical adenopathy.   Skin:     General: Skin is warm and dry.   Neurological:      Mental Status: She is alert.   Psychiatric:         Mood and Affect: Mood normal.         Behavior: Behavior normal.         Assessment/Plan   Problem List  Items Addressed This Visit       Adult hypothyroidism    Relevant Medications    levothyroxine (Synthroid, Levoxyl) 25 mcg tablet     Other Visit Diagnoses       Medicare annual wellness visit, subsequent    -  Primary    Major depressive disorder, single episode, in partial remission (CMS/[facility])        Memory change        Relevant Orders    Folate    Vitamin B12    Gastro-esophageal reflux disease without esophagitis        Relevant Medications    omeprazole (PriLOSEC) 40 mg DR capsule    Anxiety disorder, unspecified        Relevant Medications    citalopram (CeleXA) 20 mg tablet

## 2024-01-23 NOTE — PROGRESS NOTES
Collaborative Care (CoCM)  Progress Note    Type of Interaction: In Office    Start Time: 2:15 PM    End Time: 2:55 PM    Appointment: Scheduled    Reason for Visit:   Chief Complaint   Patient presents with    Follow-up      Interval History / Patient Symptoms:   Subjective   Diana Azul is a 83 y.o. female who presents for follow up for Collaborative Care services. Current symptoms include increasing dreams and thoughts that her sister is sleeping next to her or hears a noise and thinks her brother is sleeping in the attic.       Interventions Provided: Behavior Activation/Motivational Interviewing    Progress Made: Slight    Response to Intervention:   We discussed the following elements since the last appointment: Pt reports that she has been having more vivid dreams; states that she doesn't want to take any medication for sleep at this time.   Identified things that patient can do when feeling 'bored' or staying home too much-  Clean the dining room table   Take a walk  Check if there are activities to help out with at [employer]  Call a friend  Patient is able to identify what she enjoys and how she feels after engaging in activities that she likes- use as motivation to complete other activities.  Plan:   Look at purchasing a Sound Machine for sleeping, per PCP  To do list when bored  Follow Up / Next Appointment: Next appointment: 02/14/24

## 2024-01-23 NOTE — PROGRESS NOTES
Subjective   Reason for Visit: Diana Azul is an 83 y.o. female here for a Medicare Wellness visit.    Past Medical, Surgical, and Family History reviewed and updated in chart.    Reviewed all medications by prescribing practitioner or clinical pharmacist (such as prescriptions, OTCs, herbal therapies and supplements) and documented in the medical record.    HPI    Patient Care Team:  Marilee Yao DO as PCP - General (Family Medicine)  Marilee Yao DO as PCP - [employer] Advantage PCP  Rosanne M Casal, APRN-CNP, DNP as Nurse Practitioner (Hematology and Oncology)  OLIVER Simmons as  ()     Review of Systems    Objective   Vitals:  /78 (BP Location: Left arm, Patient Position: Standing, BP Cuff Size: Large adult)   Pulse 86   Wt 84.4 kg (186 lb)   SpO2 95%   BMI 31.91 kg/m²       Physical Exam  Constitutional:       General: She is not in acute distress.     Appearance: Normal appearance. She is not ill-appearing.   HENT:      Head: Normocephalic and atraumatic.   Neck:      Vascular: No carotid bruit.   Cardiovascular:      Rate and Rhythm: Normal rate and regular rhythm.      Pulses: Normal pulses.      Heart sounds: Normal heart sounds. No murmur heard.     No gallop.   Pulmonary:      Effort: Pulmonary effort is normal.      Breath sounds: Normal breath sounds. No wheezing, rhonchi or rales.   Musculoskeletal:      Cervical back: Normal range of motion and neck supple. No rigidity or tenderness.   Lymphadenopathy:      Cervical: No cervical adenopathy.   Skin:     General: Skin is warm and dry.   Neurological:      Mental Status: She is alert.   Psychiatric:         Mood and Affect: Mood normal.         Behavior: Behavior normal.         Assessment/Plan   Problem List Items Addressed This Visit       Adult hypothyroidism    Relevant Medications    levothyroxine (Synthroid, Levoxyl) 25 mcg tablet     Other Visit Diagnoses       Medicare annual  wellness visit, subsequent    -  Primary    Major depressive disorder, single episode, in partial remission (CMS/[facility])        Memory change        Relevant Orders    Folate    Vitamin B12    Gastro-esophageal reflux disease without esophagitis        Relevant Medications    omeprazole (PriLOSEC) 40 mg DR capsule    Anxiety disorder, unspecified        Relevant Medications    citalopram (CeleXA) 20 mg tablet          Medicare Wellness Billing Compliance Satisfied    *This is a visual tool to show completion of required items on the day of the visit. Green checks will only appear on the date of visit.    Review all medications by prescribing practitioner or clinical pharmacist (such as prescriptions, OTCs, herbal therapies and supplements) documented in the medical record    Past Medical, Surgical, and Family History reviewed and updated in chart    Tobacco Use Reviewed    Alcohol Use Reviewed    Illicit Drug Use Reviewed    PHQ2/9    Falls in Last Year Reviewed    Home Safety Risk Factors Reviewed    Cognitive Impairment Reviewed    Patient Self Assessment and Health Status    Current Diet Reviewed    Exercise Frequency    ADL - Hearing Impairment    ADL - Bathing    ADL - Dressing    ADL - Walks in Home    IADL - Managing Finances    IADL - Grocery Shopping    IADL - Taking Medications    IADL - Doing Housework

## 2024-01-24 ASSESSMENT — PATIENT HEALTH QUESTIONNAIRE - PHQ9
10. IF YOU CHECKED OFF ANY PROBLEMS, HOW DIFFICULT HAVE THESE PROBLEMS MADE IT FOR YOU TO DO YOUR WORK, TAKE CARE OF THINGS AT HOME, OR GET ALONG WITH OTHER PEOPLE: SOMEWHAT DIFFICULT
SUM OF ALL RESPONSES TO PHQ9 QUESTIONS 1 AND 2: 4
1. LITTLE INTEREST OR PLEASURE IN DOING THINGS: MORE THAN HALF THE DAYS
7. TROUBLE CONCENTRATING ON THINGS, SUCH AS READING THE NEWSPAPER OR WATCHING TELEVISION: SEVERAL DAYS
5. POOR APPETITE OR OVEREATING: SEVERAL DAYS
6. FEELING BAD ABOUT YOURSELF - OR THAT YOU ARE A FAILURE OR HAVE LET YOURSELF OR YOUR FAMILY DOWN: NOT AT ALL
2. FEELING DOWN, DEPRESSED OR HOPELESS: MORE THAN HALF THE DAYS
3. TROUBLE FALLING OR STAYING ASLEEP OR SLEEPING TOO MUCH: NOT AT ALL
4. FEELING TIRED OR HAVING LITTLE ENERGY: MORE THAN HALF THE DAYS
8. MOVING OR SPEAKING SO SLOWLY THAT OTHER PEOPLE COULD HAVE NOTICED. OR THE OPPOSITE, BEING SO FIGETY OR RESTLESS THAT YOU HAVE BEEN MOVING AROUND A LOT MORE THAN USUAL: NOT AT ALL
9. THOUGHTS THAT YOU WOULD BE BETTER OFF DEAD, OR OF HURTING YOURSELF: NOT AT ALL
SUM OF ALL RESPONSES TO PHQ QUESTIONS 1-9: 8

## 2024-01-24 ASSESSMENT — ACTIVITIES OF DAILY LIVING (ADL)
GROCERY_SHOPPING: INDEPENDENT
MANAGING_FINANCES: NEEDS ASSISTANCE
TAKING_MEDICATION: INDEPENDENT
DRESSING: INDEPENDENT
DOING_HOUSEWORK: INDEPENDENT
BATHING: INDEPENDENT

## 2024-01-24 NOTE — ASSESSMENT & PLAN NOTE
Stable although there has been minimal improvement since last visit.  Encouraging socialization activities and continued follow up with Behavioral Health Collaborative Care team.  Follow up appointment in our office in 3-6 months.

## 2024-01-31 ENCOUNTER — DOCUMENTATION (OUTPATIENT)
Dept: PRIMARY CARE | Facility: CLINIC | Age: 84
End: 2024-01-31
Payer: MEDICARE

## 2024-01-31 DIAGNOSIS — F33.41 RECURRENT MAJOR DEPRESSIVE DISORDER, IN PARTIAL REMISSION (CMS-HCC): Primary | ICD-10-CM

## 2024-01-31 PROCEDURE — 99494 1ST/SBSQ PSYC COLLAB CARE: CPT | Performed by: FAMILY MEDICINE

## 2024-01-31 PROCEDURE — 99493 SBSQ PSYC COLLAB CARE MGMT: CPT | Performed by: FAMILY MEDICINE

## 2024-02-14 ENCOUNTER — SOCIAL WORK (OUTPATIENT)
Dept: PRIMARY CARE | Facility: CLINIC | Age: 84
End: 2024-02-14
Payer: MEDICARE

## 2024-02-14 DIAGNOSIS — F33.41 RECURRENT MAJOR DEPRESSIVE DISORDER, IN PARTIAL REMISSION (CMS-HCC): Primary | ICD-10-CM

## 2024-02-14 ASSESSMENT — ANXIETY QUESTIONNAIRES
2. NOT BEING ABLE TO STOP OR CONTROL WORRYING: MORE THAN HALF THE DAYS
GAD7 TOTAL SCORE: 7
3. WORRYING TOO MUCH ABOUT DIFFERENT THINGS: SEVERAL DAYS
5. BEING SO RESTLESS THAT IT IS HARD TO SIT STILL: NOT AT ALL
6. BECOMING EASILY ANNOYED OR IRRITABLE: NOT AT ALL
7. FEELING AFRAID AS IF SOMETHING AWFUL MIGHT HAPPEN: NOT AT ALL
1. FEELING NERVOUS, ANXIOUS, OR ON EDGE: NEARLY EVERY DAY
IF YOU CHECKED OFF ANY PROBLEMS ON THIS QUESTIONNAIRE, HOW DIFFICULT HAVE THESE PROBLEMS MADE IT FOR YOU TO DO YOUR WORK, TAKE CARE OF THINGS AT HOME, OR GET ALONG WITH OTHER PEOPLE: SOMEWHAT DIFFICULT
4. TROUBLE RELAXING: SEVERAL DAYS

## 2024-02-14 ASSESSMENT — PATIENT HEALTH QUESTIONNAIRE - PHQ9
10. IF YOU CHECKED OFF ANY PROBLEMS, HOW DIFFICULT HAVE THESE PROBLEMS MADE IT FOR YOU TO DO YOUR WORK, TAKE CARE OF THINGS AT HOME, OR GET ALONG WITH OTHER PEOPLE: VERY DIFFICULT
8. MOVING OR SPEAKING SO SLOWLY THAT OTHER PEOPLE COULD HAVE NOTICED. OR THE OPPOSITE, BEING SO FIGETY OR RESTLESS THAT YOU HAVE BEEN MOVING AROUND A LOT MORE THAN USUAL: NOT AT ALL
4. FEELING TIRED OR HAVING LITTLE ENERGY: SEVERAL DAYS
6. FEELING BAD ABOUT YOURSELF - OR THAT YOU ARE A FAILURE OR HAVE LET YOURSELF OR YOUR FAMILY DOWN: NOT AT ALL
2. FEELING DOWN, DEPRESSED OR HOPELESS: MORE THAN HALF THE DAYS
SUM OF ALL RESPONSES TO PHQ QUESTIONS 1-9: 7
1. LITTLE INTEREST OR PLEASURE IN DOING THINGS: MORE THAN HALF THE DAYS
SUM OF ALL RESPONSES TO PHQ9 QUESTIONS 1 & 2: 4
9. THOUGHTS THAT YOU WOULD BE BETTER OFF DEAD, OR OF HURTING YOURSELF: NOT AT ALL
3. TROUBLE FALLING OR STAYING ASLEEP: MORE THAN HALF THE DAYS
7. TROUBLE CONCENTRATING ON THINGS, SUCH AS READING THE NEWSPAPER OR WATCHING TELEVISION: NOT AT ALL
5. POOR APPETITE OR OVEREATING: NOT AT ALL

## 2024-02-14 NOTE — PROGRESS NOTES
"Collaborative Care (CoCM)  Progress Note    Type of Interaction: In Office    Start Time: 11:05 AM    End Time: 11:55 AM    Appointment: Scheduled    Reason for Visit:   Chief Complaint   Patient presents with    Follow-up      Interval History / Patient Symptoms:   Subjective   Diana Azul is a 83 y.o. female who presents for follow up for Collaborative Care services.       Patient Health Questionnaire-9 Score: 7 (2/14/2024 11:13 AM)  ROJELIO-7 Total Score: 7 (2/14/2024 11:12 AM)     Interventions Provided: Motivational Interviewing    Progress Made: Slight    Response to Intervention:   We discussed the following elements from the last appointment: Pt reports she has been getting out more, has called friends and went to walk at [employer]; states that she has seen family and went with her daughter to babysit her great-grandchildren one day and enjoyed spending time with them.    Completed the SLUMS with patient. Patient struggled with questions with numbers, money, and recalling information and became highly frustrated when asked these questions.  Patient also struggled to complete the clock face, particularly putting the correct time in.  Validity is uncertain, as patient stopped at times during the test and started to become highly anxious with processing the questions and each time started a comment \"see this is why...\".  Overall patient got 17 correct, which would score her in dementia range; patient states that she believes she has some memory impairment, mainly with short term and numbers.  Patient also explained that she was not smart when she was growing up, and still holds that thought today; identifies low self esteem and self doubt.    Plan:   Continue to complete activities getting out of the house, walk, talk to friends/family  Staying in house is not helpful for keeping active    Follow Up / Next Appointment: Next appointment: 03/06/24   "

## 2024-02-29 ENCOUNTER — DOCUMENTATION (OUTPATIENT)
Dept: PRIMARY CARE | Facility: CLINIC | Age: 84
End: 2024-02-29
Payer: MEDICARE

## 2024-02-29 DIAGNOSIS — F33.41 RECURRENT MAJOR DEPRESSIVE DISORDER, IN PARTIAL REMISSION (CMS-HCC): Primary | ICD-10-CM

## 2024-02-29 PROCEDURE — 99493 SBSQ PSYC COLLAB CARE MGMT: CPT | Performed by: FAMILY MEDICINE

## 2024-03-06 ENCOUNTER — SOCIAL WORK (OUTPATIENT)
Dept: PRIMARY CARE | Facility: CLINIC | Age: 84
End: 2024-03-06
Payer: MEDICARE

## 2024-03-06 DIAGNOSIS — F33.41 RECURRENT MAJOR DEPRESSIVE DISORDER, IN PARTIAL REMISSION (CMS-HCC): Primary | ICD-10-CM

## 2024-03-06 ASSESSMENT — PATIENT HEALTH QUESTIONNAIRE - PHQ9
9. THOUGHTS THAT YOU WOULD BE BETTER OFF DEAD, OR OF HURTING YOURSELF: NOT AT ALL
1. LITTLE INTEREST OR PLEASURE IN DOING THINGS: MORE THAN HALF THE DAYS
7. TROUBLE CONCENTRATING ON THINGS, SUCH AS READING THE NEWSPAPER OR WATCHING TELEVISION: SEVERAL DAYS
8. MOVING OR SPEAKING SO SLOWLY THAT OTHER PEOPLE COULD HAVE NOTICED. OR THE OPPOSITE, BEING SO FIGETY OR RESTLESS THAT YOU HAVE BEEN MOVING AROUND A LOT MORE THAN USUAL: NOT AT ALL
SUM OF ALL RESPONSES TO PHQ QUESTIONS 1-9: 7
10. IF YOU CHECKED OFF ANY PROBLEMS, HOW DIFFICULT HAVE THESE PROBLEMS MADE IT FOR YOU TO DO YOUR WORK, TAKE CARE OF THINGS AT HOME, OR GET ALONG WITH OTHER PEOPLE: SOMEWHAT DIFFICULT
3. TROUBLE FALLING OR STAYING ASLEEP: NOT AT ALL
5. POOR APPETITE OR OVEREATING: NOT AT ALL
SUM OF ALL RESPONSES TO PHQ9 QUESTIONS 1 & 2: 3
4. FEELING TIRED OR HAVING LITTLE ENERGY: MORE THAN HALF THE DAYS
6. FEELING BAD ABOUT YOURSELF - OR THAT YOU ARE A FAILURE OR HAVE LET YOURSELF OR YOUR FAMILY DOWN: SEVERAL DAYS
2. FEELING DOWN, DEPRESSED OR HOPELESS: SEVERAL DAYS

## 2024-03-06 ASSESSMENT — ANXIETY QUESTIONNAIRES
IF YOU CHECKED OFF ANY PROBLEMS ON THIS QUESTIONNAIRE, HOW DIFFICULT HAVE THESE PROBLEMS MADE IT FOR YOU TO DO YOUR WORK, TAKE CARE OF THINGS AT HOME, OR GET ALONG WITH OTHER PEOPLE: SOMEWHAT DIFFICULT
3. WORRYING TOO MUCH ABOUT DIFFERENT THINGS: SEVERAL DAYS
1. FEELING NERVOUS, ANXIOUS, OR ON EDGE: SEVERAL DAYS
4. TROUBLE RELAXING: SEVERAL DAYS
7. FEELING AFRAID AS IF SOMETHING AWFUL MIGHT HAPPEN: NOT AT ALL
2. NOT BEING ABLE TO STOP OR CONTROL WORRYING: NOT AT ALL
GAD7 TOTAL SCORE: 4
6. BECOMING EASILY ANNOYED OR IRRITABLE: SEVERAL DAYS
5. BEING SO RESTLESS THAT IT IS HARD TO SIT STILL: NOT AT ALL

## 2024-03-06 NOTE — PROGRESS NOTES
"Collaborative Care (CoCM)  Progress Note    Type of Interaction: In Office    Start Time: 11:00 AM    End Time: 11:48 AM    Appointment: Scheduled    Reason for Visit:   Chief Complaint   Patient presents with    Follow-up      Interval History / Patient Symptoms:   Subjective   Diana Azul is a 83 y.o. female who presents for follow up for Collaborative Care services.     Patient Health Questionnaire-9 Score: 7 (3/6/2024 11:08 AM)  ROJELIO-7 Total Score: 4 (3/6/2024 11:07 AM)     Interventions Provided: Motivational Interviewing; Behavior Activation    Progress Made: Slight    Response to Intervention:   We discussed the following elements from the last appointment:    Consistency is the key; modifying activities to fit what pt is able to do- whether it is dieting, exercising or going out to dinner.  Pt reports that she is trying to eat better and is on a diet system that she has modified to meet her needs.    Talked about not taking what others say personally, including example patient shared of her 3 year old great-granddaughter who said to her \"I don't like you\" after a nap and patient was upset.  Has not been drinking wine- has small bottle of wine left and sees it there as a reminder to not drink.     Plan:   Continue allowing self to modify activities to meet your needs  Stay on task with not drinking wine and eating well    Follow Up / Next Appointment: Next appointment: 03/27/24   "

## 2024-03-14 DIAGNOSIS — F41.9 ANXIETY DISORDER, UNSPECIFIED: ICD-10-CM

## 2024-03-21 RX ORDER — BUPROPION HYDROCHLORIDE 150 MG/1
TABLET ORAL
Qty: 90 TABLET | Refills: 1 | Status: SHIPPED | OUTPATIENT
Start: 2024-03-21

## 2024-03-25 ENCOUNTER — LAB (OUTPATIENT)
Dept: LAB | Facility: LAB | Age: 84
End: 2024-03-25
Payer: MEDICARE

## 2024-03-25 DIAGNOSIS — R41.3 MEMORY CHANGE: ICD-10-CM

## 2024-03-25 DIAGNOSIS — D72.829 LEUKOCYTOSIS, UNSPECIFIED TYPE: ICD-10-CM

## 2024-03-25 LAB
ALBUMIN SERPL BCP-MCNC: 4.4 G/DL (ref 3.4–5)
ALP SERPL-CCNC: 59 U/L (ref 33–136)
ALT SERPL W P-5'-P-CCNC: 28 U/L (ref 7–45)
ANION GAP SERPL CALC-SCNC: 14 MMOL/L (ref 10–20)
AST SERPL W P-5'-P-CCNC: 21 U/L (ref 9–39)
BASOPHILS # BLD AUTO: 0.05 X10*3/UL (ref 0–0.1)
BASOPHILS NFR BLD AUTO: 0.4 %
BILIRUB SERPL-MCNC: 0.5 MG/DL (ref 0–1.2)
BUN SERPL-MCNC: 22 MG/DL (ref 6–23)
CALCIUM SERPL-MCNC: 10 MG/DL (ref 8.6–10.6)
CHLORIDE SERPL-SCNC: 106 MMOL/L (ref 98–107)
CO2 SERPL-SCNC: 27 MMOL/L (ref 21–32)
CREAT SERPL-MCNC: 0.83 MG/DL (ref 0.5–1.05)
EGFRCR SERPLBLD CKD-EPI 2021: 70 ML/MIN/1.73M*2
EOSINOPHIL # BLD AUTO: 0.14 X10*3/UL (ref 0–0.4)
EOSINOPHIL NFR BLD AUTO: 1 %
ERYTHROCYTE [DISTWIDTH] IN BLOOD BY AUTOMATED COUNT: 13.7 % (ref 11.5–14.5)
FOLATE SERPL-MCNC: >24 NG/ML
GLUCOSE SERPL-MCNC: 122 MG/DL (ref 74–99)
HCT VFR BLD AUTO: 40.1 % (ref 36–46)
HGB BLD-MCNC: 12.8 G/DL (ref 12–16)
IMM GRANULOCYTES # BLD AUTO: 0.04 X10*3/UL (ref 0–0.5)
IMM GRANULOCYTES NFR BLD AUTO: 0.3 % (ref 0–0.9)
LYMPHOCYTES # BLD AUTO: 8.81 X10*3/UL (ref 0.8–3)
LYMPHOCYTES NFR BLD AUTO: 62.5 %
MCH RBC QN AUTO: 30 PG (ref 26–34)
MCHC RBC AUTO-ENTMCNC: 31.9 G/DL (ref 32–36)
MCV RBC AUTO: 94 FL (ref 80–100)
MONOCYTES # BLD AUTO: 0.72 X10*3/UL (ref 0.05–0.8)
MONOCYTES NFR BLD AUTO: 5.1 %
NEUTROPHILS # BLD AUTO: 4.34 X10*3/UL (ref 1.6–5.5)
NEUTROPHILS NFR BLD AUTO: 30.7 %
NRBC BLD-RTO: 0 /100 WBCS (ref 0–0)
PLATELET # BLD AUTO: 182 X10*3/UL (ref 150–450)
POTASSIUM SERPL-SCNC: 4.6 MMOL/L (ref 3.5–5.3)
PROT SERPL-MCNC: 6.2 G/DL (ref 6.4–8.2)
RBC # BLD AUTO: 4.26 X10*6/UL (ref 4–5.2)
SODIUM SERPL-SCNC: 142 MMOL/L (ref 136–145)
VIT B12 SERPL-MCNC: 645 PG/ML (ref 211–911)
WBC # BLD AUTO: 14.1 X10*3/UL (ref 4.4–11.3)

## 2024-03-25 PROCEDURE — 82746 ASSAY OF FOLIC ACID SERUM: CPT

## 2024-03-25 PROCEDURE — 82607 VITAMIN B-12: CPT

## 2024-03-25 PROCEDURE — 80053 COMPREHEN METABOLIC PANEL: CPT

## 2024-03-25 PROCEDURE — 36415 COLL VENOUS BLD VENIPUNCTURE: CPT

## 2024-03-25 PROCEDURE — 85025 COMPLETE CBC W/AUTO DIFF WBC: CPT

## 2024-03-27 ENCOUNTER — SOCIAL WORK (OUTPATIENT)
Dept: PRIMARY CARE | Facility: CLINIC | Age: 84
End: 2024-03-27
Payer: MEDICARE

## 2024-03-27 DIAGNOSIS — F33.41 RECURRENT MAJOR DEPRESSIVE DISORDER, IN PARTIAL REMISSION (CMS-HCC): Primary | ICD-10-CM

## 2024-03-27 ASSESSMENT — ANXIETY QUESTIONNAIRES
5. BEING SO RESTLESS THAT IT IS HARD TO SIT STILL: NOT AT ALL
4. TROUBLE RELAXING: SEVERAL DAYS
1. FEELING NERVOUS, ANXIOUS, OR ON EDGE: SEVERAL DAYS
7. FEELING AFRAID AS IF SOMETHING AWFUL MIGHT HAPPEN: NOT AT ALL
2. NOT BEING ABLE TO STOP OR CONTROL WORRYING: NOT AT ALL
6. BECOMING EASILY ANNOYED OR IRRITABLE: SEVERAL DAYS
IF YOU CHECKED OFF ANY PROBLEMS ON THIS QUESTIONNAIRE, HOW DIFFICULT HAVE THESE PROBLEMS MADE IT FOR YOU TO DO YOUR WORK, TAKE CARE OF THINGS AT HOME, OR GET ALONG WITH OTHER PEOPLE: NOT DIFFICULT AT ALL
GAD7 TOTAL SCORE: 4
3. WORRYING TOO MUCH ABOUT DIFFERENT THINGS: SEVERAL DAYS

## 2024-03-27 ASSESSMENT — PATIENT HEALTH QUESTIONNAIRE - PHQ9
10. IF YOU CHECKED OFF ANY PROBLEMS, HOW DIFFICULT HAVE THESE PROBLEMS MADE IT FOR YOU TO DO YOUR WORK, TAKE CARE OF THINGS AT HOME, OR GET ALONG WITH OTHER PEOPLE: NOT DIFFICULT AT ALL
2. FEELING DOWN, DEPRESSED OR HOPELESS: SEVERAL DAYS
SUM OF ALL RESPONSES TO PHQ9 QUESTIONS 1 & 2: 1
7. TROUBLE CONCENTRATING ON THINGS, SUCH AS READING THE NEWSPAPER OR WATCHING TELEVISION: NOT AT ALL
4. FEELING TIRED OR HAVING LITTLE ENERGY: SEVERAL DAYS
SUM OF ALL RESPONSES TO PHQ QUESTIONS 1-9: 3
9. THOUGHTS THAT YOU WOULD BE BETTER OFF DEAD, OR OF HURTING YOURSELF: NOT AT ALL
3. TROUBLE FALLING OR STAYING ASLEEP: NOT AT ALL
1. LITTLE INTEREST OR PLEASURE IN DOING THINGS: NOT AT ALL
5. POOR APPETITE OR OVEREATING: SEVERAL DAYS
6. FEELING BAD ABOUT YOURSELF - OR THAT YOU ARE A FAILURE OR HAVE LET YOURSELF OR YOUR FAMILY DOWN: NOT AT ALL
8. MOVING OR SPEAKING SO SLOWLY THAT OTHER PEOPLE COULD HAVE NOTICED. OR THE OPPOSITE, BEING SO FIGETY OR RESTLESS THAT YOU HAVE BEEN MOVING AROUND A LOT MORE THAN USUAL: NOT AT ALL

## 2024-03-27 NOTE — PROGRESS NOTES
Collaborative Care (CoCM)  Progress Note    Type of Interaction: In Office    Start Time: 10:30 AM    End Time: 11:06 AM    Appointment: Scheduled    Reason for Visit:   Chief Complaint   Patient presents with    Follow-up      Interval History / Patient Symptoms:   Subjective   Diana Azul is a 83 y.o. female who presents for follow up for Collaborative Care services.     Patient Health Questionnaire-9 Score: 3 (3/27/2024 10:37 AM)  ROJELIO-7 Total Score: 4 (3/27/2024 10:36 AM)    Interventions Provided: Stress Management    Progress Made: Slight    Response to Intervention:   We discussed the following elements from the last appointment:   Pt going to [place] with her daughter to see her other daughter.   Identified frustrations are anything electronic- her car, ipad, tv, phone... and it really bothers her that she can't get the hang of how to work the devices.   Discussed how she gets in her head- thinking people don't like her, such as her great granddaughter, but had a really good visit yesterday with her.  Feeling better overall  1-2 glasses of wine within a month- talks self out of drinking wine and doing nutrisystem diet  Went to [employer], saying more prayers, confession and wants to go Thursday night  Weather helping with mood  Thinks she may have been scammed- discussed importance of not giving out information, such as SSN and credit card info.      Plan:   Continue to complete activities that provide happiness and contentment; continue to get out and stimulated.    Follow Up / Next Appointment: Next appointment: 04/16/24

## 2024-03-29 ENCOUNTER — DOCUMENTATION (OUTPATIENT)
Dept: PRIMARY CARE | Facility: CLINIC | Age: 84
End: 2024-03-29
Payer: MEDICARE

## 2024-03-29 DIAGNOSIS — F33.41 RECURRENT MAJOR DEPRESSIVE DISORDER, IN PARTIAL REMISSION (CMS-HCC): Primary | ICD-10-CM

## 2024-03-29 PROCEDURE — 99493 SBSQ PSYC COLLAB CARE MGMT: CPT | Performed by: FAMILY MEDICINE

## 2024-03-29 PROCEDURE — 99494 1ST/SBSQ PSYC COLLAB CARE: CPT | Performed by: FAMILY MEDICINE

## 2024-04-01 ENCOUNTER — OFFICE VISIT (OUTPATIENT)
Dept: HEMATOLOGY/ONCOLOGY | Facility: CLINIC | Age: 84
End: 2024-04-01
Payer: MEDICARE

## 2024-04-01 VITALS
RESPIRATION RATE: 16 BRPM | OXYGEN SATURATION: 97 % | SYSTOLIC BLOOD PRESSURE: 145 MMHG | WEIGHT: 182.54 LBS | DIASTOLIC BLOOD PRESSURE: 79 MMHG | BODY MASS INDEX: 31.16 KG/M2 | TEMPERATURE: 97 F | HEART RATE: 68 BPM | HEIGHT: 64 IN

## 2024-04-01 DIAGNOSIS — D72.829 LEUKOCYTOSIS, UNSPECIFIED TYPE: ICD-10-CM

## 2024-04-01 PROCEDURE — 99214 OFFICE O/P EST MOD 30 MIN: CPT | Performed by: NURSE PRACTITIONER

## 2024-04-01 PROCEDURE — 1123F ACP DISCUSS/DSCN MKR DOCD: CPT | Performed by: NURSE PRACTITIONER

## 2024-04-01 PROCEDURE — 1157F ADVNC CARE PLAN IN RCRD: CPT | Performed by: NURSE PRACTITIONER

## 2024-04-01 PROCEDURE — 1125F AMNT PAIN NOTED PAIN PRSNT: CPT | Performed by: NURSE PRACTITIONER

## 2024-04-01 PROCEDURE — 1159F MED LIST DOCD IN RCRD: CPT | Performed by: NURSE PRACTITIONER

## 2024-04-01 PROCEDURE — 1160F RVW MEDS BY RX/DR IN RCRD: CPT | Performed by: NURSE PRACTITIONER

## 2024-04-01 ASSESSMENT — PAIN SCALES - GENERAL: PAINLEVEL: 3

## 2024-04-01 NOTE — PROGRESS NOTES
Patient ID: Diana Azul is a 83 y.o. female.  Referring Physician: No referring provider defined for this encounter.  Primary Care Provider: Marilee Yao DO  Visit Type: Follow Up      Subjective    [place]  [facility] [place]: [place]13/2023  Reason: transient Leukocytosis diagnosed with CLL     Follow up visit  Patient is a 84 yo woman with a PMH of depression, chronic constipation, abdominal bloating, recent trouble with memory  referred to benign hematology for leukocytosis (Neutrophilia & Lymphocytosis) by Dr Marilee Yao.     She has had UTI's in the past and states that she was hospitalized at one time for a severe UTI in the recent past. Denies abnormal weight loss, night sweats, fever. Denies fatigue, chills, sob, cp, n/v/d, n/t. No PICA. Denies any abnormal bleeding or  bruising. No recurrent infections or lymphadenopathy. No joint/body pain. No known blood disorders in family. Has had surgery in past w/o issue. Never had blood/blood products. Denies NSAID use. The patient is accompanied by her daughter today. She admits  that she has been down in the dumps lately and is seeing a counselor for depression. She feels that her lack of exercise and sedentary life style has contributed to weight gain. Her   from pancreatic cancer 5 years ago, and she has had a hard  time dealing with her grief since that time.     ROS:  Patient reports fatigue and urinary urgency and some stress incontinence. She also states that she has been trying to lose weight unsuccessfully. She tried Nutra-Systems with no success and now another diet with energy bars. 10 point review of systems negative except as stated in HPI      Past Medical History : as above, also  ? 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 Dysfunction of both eustachian tubes (381.81) (H69.83)  ? Elevated BP without diagnosis of hypertension (796.2) (R03.0)  ? Elevated glucose (790.29) (R73.09)  ? Esophageal reflux (530.81) (K21.9)  ? GERD  "(gastroesophageal reflux disease) (530.81) (K21.9)  ? Hiatal hernia (553.3) (K44.9)  ? Hyperlipidemia (272.4) (E78.5)  ? Leukocytosis (288.60) (D72.829)  ? Low back pain (724.2) (M54.50)  ? Memory deficit (780.93) (R41.3)  ? 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 Mother  at 82 from MI.  patient has 3 daughters and 2 sons. 1 daughter had breast cancer but in remission  ? Family history of congenital heart disease (V19.5) (Z82.79)  ? Family history of myocardial infarction (V17.3) (Z82.49)  ? Family history of congenital heart disease (V19.5) (Z82.79)     Social History:  lives alone. Daughter is main social support  ? Never used tobacco (V49.89) (Z78.9)  ? Social alcohol use (Z78.9)        Family History: No Family History items are recorded  in the problem list.      Social History:   Social Substance History:  ·  Smoking Status never smoker (1)   ·  Tobacco Use denies   ·  Alcohol Use occasionally   ·  Drug Use denies        Objective   BSA: 1.93 meters squared  /79 (BP Location: Left arm, Patient Position: Sitting, BP Cuff Size: Adult)   Pulse 68   Temp 36.1 °C (97 °F) (Temporal)   Resp 16   Ht 1.626 m (5' 4.02\")   Wt 82.8 kg (182 lb 8.7 oz)   SpO2 97%   BMI 31.32 kg/m²      has a past medical history of Personal history of other mental and behavioral disorders (10/03/2013).   has no past surgical history on file.      Diana Azul  reports that she has never smoked. She has never used smokeless tobacco.  She  reports current alcohol use of about 7.0 standard drinks of alcohol per week.  She  reports no history of drug use.    Physical Exam  HENT:      Head: Normocephalic.      Nose: Nose normal.      Mouth/Throat:      Mouth: Mucous " membranes are moist.   Eyes:      Pupils: Pupils are equal, round, and reactive to light.   Cardiovascular:      Rate and Rhythm: Normal rate and regular rhythm.      Pulses: Normal pulses.      Heart sounds: Normal heart sounds.   Pulmonary:      Effort: Pulmonary effort is normal.      Breath sounds: Normal breath sounds.   Abdominal:      General: Bowel sounds are normal.      Palpations: Abdomen is soft.   Musculoskeletal:         General: Normal range of motion.   Skin:     General: Skin is warm and dry.   Neurological:      General: No focal deficit present.      Mental Status: She is alert and oriented to person, place, and time.   Psychiatric:         Mood and Affect: Mood normal.         Behavior: Behavior normal.         WBC   Date/Time Value Ref Range Status   03/25/2024 10:13 AM 14.1 (H) 4.4 - 11.3 x10*3/uL Final   12/08/2023 08:56 AM 15.5 (H) 4.4 - 11.3 x10*3/uL Final   10/26/2023 11:20 AM 14.1 (H) 4.4 - 11.3 x10*3/uL Final     nRBC   Date Value Ref Range Status   03/25/2024 0.0 0.0 - 0.0 /100 WBCs Final   12/08/2023   Final     Comment:     Not Measured   10/26/2023 0.0 0.0 - 0.0 /100 WBCs Final     RBC   Date Value Ref Range Status   03/25/2024 4.26 4.00 - 5.20 x10*6/uL Final   12/08/2023 4.11 4.00 - 5.20 x10*6/uL Final   10/26/2023 3.97 (L) 4.00 - 5.20 x10*6/uL Final     Hemoglobin   Date Value Ref Range Status   03/25/2024 12.8 12.0 - 16.0 g/dL Final   12/08/2023 12.5 12.0 - 16.0 g/dL Final   10/26/2023 11.9 (L) 12.0 - 16.0 g/dL Final     Hematocrit   Date Value Ref Range Status   03/25/2024 40.1 36.0 - 46.0 % Final   12/08/2023 39.4 36.0 - 46.0 % Final   10/26/2023 37.9 36.0 - 46.0 % Final     MCV   Date/Time Value Ref Range Status   03/25/2024 10:13 AM 94 80 - 100 fL Final   12/08/2023 08:56 AM 96 80 - 100 fL Final   10/26/2023 11:20 AM 96 80 - 100 fL Final     [facility]   Date/Time Value Ref Range Status   03/25/2024 10:13 AM 30.0 26.0 - 34.0 pg Final   12/08/2023 08:56 AM 30.4 26.0 - 34.0 pg Final    10/26/2023 11:20 AM 30.0 26.0 - 34.0 pg Final     MCHC   Date/Time Value Ref Range Status   03/25/2024 10:13 AM 31.9 (L) 32.0 - 36.0 g/dL Final   12/08/2023 08:56 AM 31.7 (L) 32.0 - 36.0 g/dL Final   10/26/2023 11:20 AM 31.4 (L) 32.0 - 36.0 g/dL Final     RDW   Date/Time Value Ref Range Status   03/25/2024 10:13 AM 13.7 11.5 - 14.5 % Final   12/08/2023 08:56 AM 13.9 11.5 - 14.5 % Final   10/26/2023 11:20 AM 14.2 11.5 - 14.5 % Final     Platelets   Date/Time Value Ref Range Status   03/25/2024 10:13  150 - 450 x10*3/uL Final   12/08/2023 08:56  150 - 450 x10*3/uL Final   10/26/2023 11:20  150 - 450 x10*3/uL Final     MPV   Date/Time Value Ref Range Status   10/26/2023 11:20 AM 12.2 (H) 7.5 - 11.5 fL Final     Neutrophils %   Date/Time Value Ref Range Status   03/25/2024 10:13 AM 30.7 40.0 - 80.0 % Final   12/08/2023 08:56 AM 33.1 40.0 - 80.0 % Final   10/26/2023 11:20 AM 37.4 40.0 - 80.0 % Final     Immature Granulocytes %, Automated   Date/Time Value Ref Range Status   03/25/2024 10:13 AM 0.3 0.0 - 0.9 % Final     Comment:     Immature Granulocyte Count (IG) includes promyelocytes, myelocytes and metamyelocytes but does not include bands. Percent differential counts (%) should be interpreted in the context of the absolute cell counts (cells/UL).   12/08/2023 08:56 AM 0.5 0.0 - 0.9 % Final     Comment:     Immature Granulocyte Count (IG) includes promyelocytes, myelocytes and metamyelocytes but does not include bands. Percent differential counts (%) should be interpreted in the context of the absolute cell counts (cells/UL).   10/26/2023 11:20 AM 0.4 0.0 - 0.9 % Final     Comment:     Immature Granulocyte Count (IG) includes promyelocytes, myelocytes and metamyelocytes but does not include bands. Percent differential counts (%) should be interpreted in the context of the absolute cell counts (cells/UL).     Lymphocytes %   Date/Time Value Ref Range Status   03/25/2024 10:13 AM 62.5 13.0 - 44.0 %  Final   12/08/2023 08:56 AM 60.6 13.0 - 44.0 % Final   10/26/2023 11:20 AM 54.9 13.0 - 44.0 % Final     Monocytes %   Date/Time Value Ref Range Status   03/25/2024 10:13 AM 5.1 2.0 - 10.0 % Final   12/08/2023 08:56 AM 4.9 2.0 - 10.0 % Final   10/26/2023 11:20 AM 5.8 2.0 - 10.0 % Final     Eosinophils %   Date/Time Value Ref Range Status   03/25/2024 10:13 AM 1.0 0.0 - 6.0 % Final   12/08/2023 08:56 AM 0.8 0.0 - 6.0 % Final   10/26/2023 11:20 AM 1.1 0.0 - 6.0 % Final     Basophils %   Date/Time Value Ref Range Status   03/25/2024 10:13 AM 0.4 0.0 - 2.0 % Final   12/08/2023 08:56 AM 0.1 0.0 - 2.0 % Final   10/26/2023 11:20 AM 0.4 0.0 - 2.0 % Final     Neutrophils Absolute   Date/Time Value Ref Range Status   03/25/2024 10:13 AM 4.34 1.60 - 5.50 x10*3/uL Final     Comment:     Percent differential counts (%) should be interpreted in the context of the absolute cell counts (cells/uL).   12/08/2023 08:56 AM 5.14 1.60 - 5.50 x10*3/uL Final     Comment:     Percent differential counts (%) should be interpreted in the context of the absolute cell counts (cells/uL).   10/26/2023 11:20 AM 5.27 1.60 - 5.50 x10*3/uL Final     Comment:     Percent differential counts (%) should be interpreted in the context of the absolute cell counts (cells/uL).     Immature Granulocytes Absolute, Automated   Date/Time Value Ref Range Status   03/25/2024 10:13 AM 0.04 0.00 - 0.50 x10*3/uL Final   12/08/2023 08:56 AM 0.07 0.00 - 0.50 x10*3/uL Final   10/26/2023 11:20 AM 0.05 0.00 - 0.50 x10*3/uL Final     Lymphocytes Absolute   Date/Time Value Ref Range Status   03/25/2024 10:13 AM 8.81 (H) 0.80 - 3.00 x10*3/uL Final   12/08/2023 08:56 AM 9.39 (H) 0.80 - 3.00 x10*3/uL Final   10/26/2023 11:20 AM 7.72 (H) 0.80 - 3.00 x10*3/uL Final     Monocytes Absolute   Date/Time Value Ref Range Status   03/25/2024 10:13 AM 0.72 0.05 - 0.80 x10*3/uL Final   12/08/2023 08:56 AM 0.76 0.05 - 0.80 x10*3/uL Final   10/26/2023 11:20 AM 0.82 (H) 0.05 - 0.80 x10*3/uL  Final     Eosinophils Absolute   Date/Time Value Ref Range Status   03/25/2024 10:13 AM 0.14 0.00 - 0.40 x10*3/uL Final   12/08/2023 08:56 AM 0.12 0.00 - 0.40 x10*3/uL Final   10/26/2023 11:20 AM 0.16 0.00 - 0.40 x10*3/uL Final     Basophils Absolute   Date/Time Value Ref Range Status   03/25/2024 10:13 AM 0.05 0.00 - 0.10 x10*3/uL Final   12/08/2023 08:56 AM 0.02 0.00 - 0.10 x10*3/uL Final     Comment:     Automated WBC differential has been confirmed by manual smear.   10/26/2023 11:20 AM 0.05 0.00 - 0.10 x10*3/uL Final     Assessment/Plan       82 yo woman with a PMH of depression, chronic constipation, abdominal bloating, recent trouble with memory  referred to benign hematology for leukocytosis (neutrophilia)  by Dr Marilee Yao. Review of her labwork noted a transient leukocytosis since May 2020. Only one diff noted elevation of both neutrophils and lymphocytes. The patient notes that she had a UTI this past December and was hospitalized and this correlates  with December elevation.     At the first visit we discussed the various possible causes of leukocytosis include infection, inflammation, steroid or other medication induced, smoking, and also heme malignancy. My suspicion was low for CML or CLL since both cell lines are elevated.  However we proceeded with peripheral flow cytometry we did note that she has a clonal population with a CLL/SLL-like phonotype.     CD5+ B-cell clonal population with a chronic lymphocytic leukemia/small lymphocytic lymphoma (CLL/SLL)-like phenotype.  No discrete blast population identified.  No abnormality of granulocytes or monocytes noted.    The monoclonal B-cell count is estimated at 3.3 x 10E9/L. While the clonal B-cell population has a phenotype consistent with CLL/SLL, in the absence of   lymphadenopathy, organomegaly or extramedullary disease, a clonal population less than 5x10E9/L is consistent with monoclonal B-cell lymphocytosis (MBL).   Of note, there is  "heterogeneity of the CD5+ B-cell population, with a subpopulation showing dimmer expression of CD19, CD45, and negative for sKappa/sLambda, and the other subpopulation with higher level of expression of CD19 and CD45, being dimly  positive  for CD25 and sLambda+. It is unclear if this represent two separate clonal processes or heterogeneity of a single clonal disorder (favored, as both are CD5+/CD23+). Clinical and morphologic correlation is suggested.         We discussed labwork today. She is stable and does not require any tup of treatment at this time. The patient does state that she fell at the ball game and is sore, but no serious injuries.. Given her diagnosis, I would recommend watchful waiting since  the patient has no \"B symptoms\" or other abnormal findings in the differential. Her spleen is normal size. Several months ago, we discussed with the patient and her daughter that she has a very early stage blood cancer and we would like to follow labwork every 6 months. If nothing changes we will increase the interval for labwork.        PLAN:  1. Patient will return in 6 months for CBC/diff, CMP with in person visit.   2. She should call in the interim if questions, problems or new symptoms arise.  3. Patient should follow up with PCP and therapist for non-hematology issues.     I had an extensive discussion with the patient regarding the diagnosis and discussed the plan of therapy, including general considerations regarding side effects and outcomes. Pt understood and gave appropriate teach back about the plan of care. All questions  were answered to the patient's satisfaction. The patient is instructed to contact us at any time if questions or problems arise. Thank you for the opportunity to participate in the care of this very pleasant patient.               Rosanne M Casal, APRN-CNP, DNP                         "

## 2024-04-16 ENCOUNTER — SOCIAL WORK (OUTPATIENT)
Dept: PRIMARY CARE | Facility: CLINIC | Age: 84
End: 2024-04-16
Payer: MEDICARE

## 2024-04-16 DIAGNOSIS — F33.41 RECURRENT MAJOR DEPRESSIVE DISORDER, IN PARTIAL REMISSION (CMS-HCC): Primary | ICD-10-CM

## 2024-04-16 ASSESSMENT — PATIENT HEALTH QUESTIONNAIRE - PHQ9
6. FEELING BAD ABOUT YOURSELF - OR THAT YOU ARE A FAILURE OR HAVE LET YOURSELF OR YOUR FAMILY DOWN: NOT AT ALL
5. POOR APPETITE OR OVEREATING: NOT AT ALL
10. IF YOU CHECKED OFF ANY PROBLEMS, HOW DIFFICULT HAVE THESE PROBLEMS MADE IT FOR YOU TO DO YOUR WORK, TAKE CARE OF THINGS AT HOME, OR GET ALONG WITH OTHER PEOPLE: NOT DIFFICULT AT ALL
SUM OF ALL RESPONSES TO PHQ9 QUESTIONS 1 & 2: 2
1. LITTLE INTEREST OR PLEASURE IN DOING THINGS: SEVERAL DAYS
9. THOUGHTS THAT YOU WOULD BE BETTER OFF DEAD, OR OF HURTING YOURSELF: NOT AT ALL
4. FEELING TIRED OR HAVING LITTLE ENERGY: SEVERAL DAYS
SUM OF ALL RESPONSES TO PHQ QUESTIONS 1-9: 3
2. FEELING DOWN, DEPRESSED OR HOPELESS: SEVERAL DAYS
3. TROUBLE FALLING OR STAYING ASLEEP: NOT AT ALL
7. TROUBLE CONCENTRATING ON THINGS, SUCH AS READING THE NEWSPAPER OR WATCHING TELEVISION: NOT AT ALL
8. MOVING OR SPEAKING SO SLOWLY THAT OTHER PEOPLE COULD HAVE NOTICED. OR THE OPPOSITE, BEING SO FIGETY OR RESTLESS THAT YOU HAVE BEEN MOVING AROUND A LOT MORE THAN USUAL: NOT AT ALL

## 2024-04-16 ASSESSMENT — ANXIETY QUESTIONNAIRES
1. FEELING NERVOUS, ANXIOUS, OR ON EDGE: SEVERAL DAYS
5. BEING SO RESTLESS THAT IT IS HARD TO SIT STILL: NOT AT ALL
2. NOT BEING ABLE TO STOP OR CONTROL WORRYING: NOT AT ALL
IF YOU CHECKED OFF ANY PROBLEMS ON THIS QUESTIONNAIRE, HOW DIFFICULT HAVE THESE PROBLEMS MADE IT FOR YOU TO DO YOUR WORK, TAKE CARE OF THINGS AT HOME, OR GET ALONG WITH OTHER PEOPLE: NOT DIFFICULT AT ALL
7. FEELING AFRAID AS IF SOMETHING AWFUL MIGHT HAPPEN: NOT AT ALL
3. WORRYING TOO MUCH ABOUT DIFFERENT THINGS: SEVERAL DAYS
GAD7 TOTAL SCORE: 4
4. TROUBLE RELAXING: SEVERAL DAYS
6. BECOMING EASILY ANNOYED OR IRRITABLE: SEVERAL DAYS

## 2024-04-16 NOTE — PROGRESS NOTES
Collaborative Care (CoCM)  Progress Note    Type of Interaction: In Office    Start Time: 11:05 AM    End Time: 11:50 AM    Appointment: Scheduled    Reason for Visit:   Chief Complaint   Patient presents with    Follow-up      Interval History / Patient Symptoms:   Subjective   Diana Azul is a 83 y.o. female who presents for follow up for Collaborative Care services.     Patient Health Questionnaire-9 Score: 3 (4/16/2024 11:10 AM)  ROJELIO-7 Total Score: 4 (4/16/2024 11:10 AM)    Interventions Provided: CBT    Progress Made: Slight    Response to Intervention:  We discussed the following elements from the last appointment:    Walking, continues diet and doing well  Going to [employer] every week at 4:30 mass  Went and visited friend in assisted living facility; talked about realistic expectations of taking her friend to lunch- friend is in memory care and may be too much for patient to manage, even with her daughter helping.    Plan:   Patient follow up in 4 weeks  Continue with exercise and diet routines    Follow Up / Next Appointment: Next appointment: 05/16/24

## 2024-04-30 ENCOUNTER — DOCUMENTATION (OUTPATIENT)
Dept: PRIMARY CARE | Facility: CLINIC | Age: 84
End: 2024-04-30
Payer: MEDICARE

## 2024-04-30 DIAGNOSIS — F33.41 RECURRENT MAJOR DEPRESSIVE DISORDER, IN PARTIAL REMISSION (CMS-HCC): Primary | ICD-10-CM

## 2024-04-30 PROCEDURE — 99493 SBSQ PSYC COLLAB CARE MGMT: CPT | Performed by: FAMILY MEDICINE

## 2024-05-16 ENCOUNTER — SOCIAL WORK (OUTPATIENT)
Dept: PRIMARY CARE | Facility: CLINIC | Age: 84
End: 2024-05-16
Payer: MEDICARE

## 2024-05-16 ENCOUNTER — TELEPHONE (OUTPATIENT)
Dept: PRIMARY CARE | Facility: CLINIC | Age: 84
End: 2024-05-16

## 2024-05-16 DIAGNOSIS — H69.83 OTHER SPECIFIED DISORDERS OF EUSTACHIAN TUBE, BILATERAL: ICD-10-CM

## 2024-05-16 DIAGNOSIS — F33.41 RECURRENT MAJOR DEPRESSIVE DISORDER, IN PARTIAL REMISSION (CMS-HCC): Primary | ICD-10-CM

## 2024-05-16 ASSESSMENT — PATIENT HEALTH QUESTIONNAIRE - PHQ9
2. FEELING DOWN, DEPRESSED OR HOPELESS: NOT AT ALL
4. FEELING TIRED OR HAVING LITTLE ENERGY: NOT AT ALL
SUM OF ALL RESPONSES TO PHQ QUESTIONS 1-9: 1
5. POOR APPETITE OR OVEREATING: NOT AT ALL
8. MOVING OR SPEAKING SO SLOWLY THAT OTHER PEOPLE COULD HAVE NOTICED. OR THE OPPOSITE, BEING SO FIGETY OR RESTLESS THAT YOU HAVE BEEN MOVING AROUND A LOT MORE THAN USUAL: NOT AT ALL
10. IF YOU CHECKED OFF ANY PROBLEMS, HOW DIFFICULT HAVE THESE PROBLEMS MADE IT FOR YOU TO DO YOUR WORK, TAKE CARE OF THINGS AT HOME, OR GET ALONG WITH OTHER PEOPLE: SOMEWHAT DIFFICULT
9. THOUGHTS THAT YOU WOULD BE BETTER OFF DEAD, OR OF HURTING YOURSELF: NOT AT ALL
SUM OF ALL RESPONSES TO PHQ9 QUESTIONS 1 & 2: 0
6. FEELING BAD ABOUT YOURSELF - OR THAT YOU ARE A FAILURE OR HAVE LET YOURSELF OR YOUR FAMILY DOWN: SEVERAL DAYS
1. LITTLE INTEREST OR PLEASURE IN DOING THINGS: NOT AT ALL
3. TROUBLE FALLING OR STAYING ASLEEP: NOT AT ALL
7. TROUBLE CONCENTRATING ON THINGS, SUCH AS READING THE NEWSPAPER OR WATCHING TELEVISION: NOT AT ALL

## 2024-05-16 ASSESSMENT — ANXIETY QUESTIONNAIRES
3. WORRYING TOO MUCH ABOUT DIFFERENT THINGS: NOT AT ALL
GAD7 TOTAL SCORE: 3
7. FEELING AFRAID AS IF SOMETHING AWFUL MIGHT HAPPEN: NOT AT ALL
6. BECOMING EASILY ANNOYED OR IRRITABLE: NOT AT ALL
4. TROUBLE RELAXING: SEVERAL DAYS
1. FEELING NERVOUS, ANXIOUS, OR ON EDGE: SEVERAL DAYS
5. BEING SO RESTLESS THAT IT IS HARD TO SIT STILL: NOT AT ALL
IF YOU CHECKED OFF ANY PROBLEMS ON THIS QUESTIONNAIRE, HOW DIFFICULT HAVE THESE PROBLEMS MADE IT FOR YOU TO DO YOUR WORK, TAKE CARE OF THINGS AT HOME, OR GET ALONG WITH OTHER PEOPLE: SOMEWHAT DIFFICULT
2. NOT BEING ABLE TO STOP OR CONTROL WORRYING: SEVERAL DAYS

## 2024-05-16 NOTE — PROGRESS NOTES
Celena needs a refill of flonase sent to [facility] in [place] ([facility]).    She also scheduled a 6 mo follow up visit for you in July.
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
## 2024-05-17 RX ORDER — FLUTICASONE PROPIONATE 50 MCG
2 SPRAY, SUSPENSION (ML) NASAL DAILY
Qty: 48 ML | Refills: 1 | Status: SHIPPED | OUTPATIENT
Start: 2024-05-17

## 2024-05-31 ENCOUNTER — DOCUMENTATION (OUTPATIENT)
Dept: PRIMARY CARE | Facility: CLINIC | Age: 84
End: 2024-05-31
Payer: MEDICARE

## 2024-05-31 DIAGNOSIS — F33.41 RECURRENT MAJOR DEPRESSIVE DISORDER, IN PARTIAL REMISSION (CMS-HCC): Primary | ICD-10-CM

## 2024-06-04 ENCOUNTER — OFFICE VISIT (OUTPATIENT)
Dept: PRIMARY CARE | Facility: CLINIC | Age: 84
End: 2024-06-04
Payer: MEDICARE

## 2024-06-04 VITALS
WEIGHT: 164 LBS | DIASTOLIC BLOOD PRESSURE: 68 MMHG | TEMPERATURE: 98 F | OXYGEN SATURATION: 96 % | SYSTOLIC BLOOD PRESSURE: 117 MMHG | BODY MASS INDEX: 28.14 KG/M2 | HEART RATE: 73 BPM

## 2024-06-04 DIAGNOSIS — L98.9 SKIN LESION: ICD-10-CM

## 2024-06-04 DIAGNOSIS — J01.10 ACUTE NON-RECURRENT FRONTAL SINUSITIS: Primary | ICD-10-CM

## 2024-06-04 PROCEDURE — 1125F AMNT PAIN NOTED PAIN PRSNT: CPT | Performed by: FAMILY MEDICINE

## 2024-06-04 PROCEDURE — 1123F ACP DISCUSS/DSCN MKR DOCD: CPT | Performed by: FAMILY MEDICINE

## 2024-06-04 PROCEDURE — 1157F ADVNC CARE PLAN IN RCRD: CPT | Performed by: FAMILY MEDICINE

## 2024-06-04 PROCEDURE — 99213 OFFICE O/P EST LOW 20 MIN: CPT | Performed by: FAMILY MEDICINE

## 2024-06-04 PROCEDURE — 1159F MED LIST DOCD IN RCRD: CPT | Performed by: FAMILY MEDICINE

## 2024-06-04 RX ORDER — CEFDINIR 300 MG/1
300 CAPSULE ORAL 2 TIMES DAILY
Qty: 20 CAPSULE | Refills: 0 | Status: SHIPPED | OUTPATIENT
Start: 2024-06-04 | End: 2024-06-14

## 2024-06-04 RX ORDER — PREDNISONE 20 MG/1
TABLET ORAL DAILY
Qty: 9 TABLET | Refills: 0 | Status: SHIPPED | OUTPATIENT
Start: 2024-06-04 | End: 2024-06-10

## 2024-06-04 ASSESSMENT — PAIN SCALES - GENERAL: PAINLEVEL: 6

## 2024-06-04 NOTE — PROGRESS NOTES
Subjective   Patient ID: Diana Azul is a 84 y.o. female who presents for Cough, Nasal Congestion, and Sinus Problem (3 WEEKS AGO).    HPI  SINUS SX:  Started 3 weeks ago  PND and coughing  Feeling hot and cold on and off  Coughing more with drainage - now with greenish discoloration    She was seen at [facility] and treated symptomatically with over-the-counter cold remedies  No change in symptoms, progressively worsening.    Review of Systems    Review of Systems negative except as noted in HPI and Chief complaint.     Objective             VITALS:  /68 (BP Location: Left arm, Patient Position: Sitting, BP Cuff Size: Adult)   Pulse 73   Temp 36.7 °C (98 °F) (Temporal)   Wt 74.4 kg (164 lb)   SpO2 96%   BMI 28.14 kg/m²      Physical Exam  Constitutional:       Appearance: Normal appearance.   HENT:      Head: Normocephalic and atraumatic.      Right Ear: Ear canal and external ear normal.      Left Ear: Ear canal and external ear normal.      Nose: Congestion present.      Comments: Nasal turbinates boggy and injected   Purulent discharge noted     Mouth/Throat:      Mouth: Mucous membranes are moist.      Pharynx: Posterior oropharyngeal erythema present.      Comments: Thick PND noted  Eyes:      Conjunctiva/sclera: Conjunctivae normal.      Pupils: Pupils are equal, round, and reactive to light.   Cardiovascular:      Rate and Rhythm: Normal rate and regular rhythm.      Pulses: Normal pulses.      Heart sounds: Normal heart sounds.   Pulmonary:      Effort: Pulmonary effort is normal.      Breath sounds: Normal breath sounds. No wheezing, rhonchi or rales.   Musculoskeletal:      Cervical back: No rigidity or tenderness.      Right lower leg: No edema.      Left lower leg: No edema.   Lymphadenopathy:      Cervical: No cervical adenopathy.   Skin:     Findings: No rash.   Neurological:      General: No focal deficit present.      Mental Status: She is alert.   Psychiatric:         Mood and  Affect: Mood normal.         Behavior: Behavior normal.         Assessment/Plan   Problem List Items Addressed This Visit    None  Visit Diagnoses       Acute non-recurrent frontal sinusitis    -  Primary    Symptomatic care in addition to ATB course.  Call if not improving.    Relevant Medications    cefdinir (Omnicef) 300 mg capsule    predniSONE (Deltasone) 20 mg tablet    Skin lesion        Relevant Orders    Referral to Dermatology            FOLLOW UP PRN, SOONER WITH ANY PROBLEMS OR CONCERNS.

## 2024-06-05 ASSESSMENT — ENCOUNTER SYMPTOMS
DEPRESSION: 0
OCCASIONAL FEELINGS OF UNSTEADINESS: 0
LOSS OF SENSATION IN FEET: 0

## 2024-06-13 ENCOUNTER — PATIENT MESSAGE (OUTPATIENT)
Dept: PRIMARY CARE | Facility: CLINIC | Age: 84
End: 2024-06-13
Payer: MEDICARE

## 2024-06-13 DIAGNOSIS — J01.11 ACUTE RECURRENT FRONTAL SINUSITIS: Primary | ICD-10-CM

## 2024-06-14 RX ORDER — AZITHROMYCIN 250 MG/1
TABLET, FILM COATED ORAL DAILY
Qty: 6 TABLET | Refills: 0 | Status: SHIPPED | OUTPATIENT
Start: 2024-06-14 | End: 2024-06-19

## 2024-06-20 ENCOUNTER — APPOINTMENT (OUTPATIENT)
Dept: PRIMARY CARE | Facility: CLINIC | Age: 84
End: 2024-06-20
Payer: MEDICARE

## 2024-06-22 DIAGNOSIS — K21.9 GASTROESOPHAGEAL REFLUX DISEASE WITHOUT ESOPHAGITIS: ICD-10-CM

## 2024-06-25 ENCOUNTER — TELEPHONE (OUTPATIENT)
Dept: PRIMARY CARE | Facility: CLINIC | Age: 84
End: 2024-06-25
Payer: MEDICARE

## 2024-07-09 ENCOUNTER — APPOINTMENT (OUTPATIENT)
Dept: PRIMARY CARE | Facility: CLINIC | Age: 84
End: 2024-07-09
Payer: MEDICARE

## 2024-07-09 ENCOUNTER — SOCIAL WORK (OUTPATIENT)
Dept: PRIMARY CARE | Facility: CLINIC | Age: 84
End: 2024-07-09
Payer: MEDICARE

## 2024-07-09 ENCOUNTER — LAB (OUTPATIENT)
Dept: LAB | Facility: LAB | Age: 84
End: 2024-07-09
Payer: MEDICARE

## 2024-07-09 VITALS — DIASTOLIC BLOOD PRESSURE: 70 MMHG | SYSTOLIC BLOOD PRESSURE: 122 MMHG | OXYGEN SATURATION: 98 % | HEART RATE: 75 BPM

## 2024-07-09 DIAGNOSIS — E03.9 ADULT HYPOTHYROIDISM: ICD-10-CM

## 2024-07-09 DIAGNOSIS — F33.41 RECURRENT MAJOR DEPRESSIVE DISORDER, IN PARTIAL REMISSION (CMS-HCC): Primary | ICD-10-CM

## 2024-07-09 DIAGNOSIS — F41.9 ANXIETY DISORDER, UNSPECIFIED: Primary | ICD-10-CM

## 2024-07-09 DIAGNOSIS — E78.2 MIXED HYPERLIPIDEMIA: ICD-10-CM

## 2024-07-09 DIAGNOSIS — F33.41 RECURRENT MAJOR DEPRESSIVE DISORDER, IN PARTIAL REMISSION (CMS-HCC): ICD-10-CM

## 2024-07-09 DIAGNOSIS — H69.83 OTHER SPECIFIED DISORDERS OF EUSTACHIAN TUBE, BILATERAL: ICD-10-CM

## 2024-07-09 LAB
CHOLEST SERPL-MCNC: 147 MG/DL (ref 0–199)
CHOLESTEROL/HDL RATIO: 3.1
HDLC SERPL-MCNC: 46.8 MG/DL
LDLC SERPL CALC-MCNC: 76 MG/DL
NON HDL CHOLESTEROL: 100 MG/DL (ref 0–149)
TRIGL SERPL-MCNC: 120 MG/DL (ref 0–149)
VLDL: 24 MG/DL (ref 0–40)

## 2024-07-09 PROCEDURE — 99214 OFFICE O/P EST MOD 30 MIN: CPT | Performed by: FAMILY MEDICINE

## 2024-07-09 PROCEDURE — 1036F TOBACCO NON-USER: CPT | Performed by: FAMILY MEDICINE

## 2024-07-09 PROCEDURE — 36415 COLL VENOUS BLD VENIPUNCTURE: CPT

## 2024-07-09 PROCEDURE — 1123F ACP DISCUSS/DSCN MKR DOCD: CPT | Performed by: FAMILY MEDICINE

## 2024-07-09 PROCEDURE — 1157F ADVNC CARE PLAN IN RCRD: CPT | Performed by: FAMILY MEDICINE

## 2024-07-09 PROCEDURE — 1159F MED LIST DOCD IN RCRD: CPT | Performed by: FAMILY MEDICINE

## 2024-07-09 PROCEDURE — 1160F RVW MEDS BY RX/DR IN RCRD: CPT | Performed by: FAMILY MEDICINE

## 2024-07-09 PROCEDURE — 80061 LIPID PANEL: CPT

## 2024-07-09 RX ORDER — SIMVASTATIN 40 MG/1
40 TABLET, FILM COATED ORAL DAILY
Qty: 90 TABLET | Refills: 1 | Status: SHIPPED | OUTPATIENT
Start: 2024-07-09

## 2024-07-09 RX ORDER — CITALOPRAM 20 MG/1
20 TABLET, FILM COATED ORAL DAILY
Qty: 100 TABLET | Refills: 1 | Status: SHIPPED | OUTPATIENT
Start: 2024-07-09

## 2024-07-09 RX ORDER — LEVOTHYROXINE SODIUM 25 UG/1
25 TABLET ORAL DAILY
Qty: 100 TABLET | Refills: 1 | Status: SHIPPED | OUTPATIENT
Start: 2024-07-09

## 2024-07-09 RX ORDER — FLUTICASONE PROPIONATE 50 MCG
2 SPRAY, SUSPENSION (ML) NASAL DAILY
Qty: 48 ML | Refills: 1 | Status: SHIPPED | OUTPATIENT
Start: 2024-07-09

## 2024-07-09 ASSESSMENT — PATIENT HEALTH QUESTIONNAIRE - PHQ9
9. THOUGHTS THAT YOU WOULD BE BETTER OFF DEAD, OR OF HURTING YOURSELF: NOT AT ALL
SUM OF ALL RESPONSES TO PHQ QUESTIONS 1-9: 5
5. POOR APPETITE OR OVEREATING: NOT AT ALL
1. LITTLE INTEREST OR PLEASURE IN DOING THINGS: MORE THAN HALF THE DAYS
4. FEELING TIRED OR HAVING LITTLE ENERGY: SEVERAL DAYS
SUM OF ALL RESPONSES TO PHQ9 QUESTIONS 1 & 2: 3
7. TROUBLE CONCENTRATING ON THINGS, SUCH AS READING THE NEWSPAPER OR WATCHING TELEVISION: SEVERAL DAYS
3. TROUBLE FALLING OR STAYING ASLEEP: NOT AT ALL
6. FEELING BAD ABOUT YOURSELF - OR THAT YOU ARE A FAILURE OR HAVE LET YOURSELF OR YOUR FAMILY DOWN: NOT AT ALL
10. IF YOU CHECKED OFF ANY PROBLEMS, HOW DIFFICULT HAVE THESE PROBLEMS MADE IT FOR YOU TO DO YOUR WORK, TAKE CARE OF THINGS AT HOME, OR GET ALONG WITH OTHER PEOPLE: SOMEWHAT DIFFICULT
2. FEELING DOWN, DEPRESSED OR HOPELESS: SEVERAL DAYS
8. MOVING OR SPEAKING SO SLOWLY THAT OTHER PEOPLE COULD HAVE NOTICED. OR THE OPPOSITE, BEING SO FIGETY OR RESTLESS THAT YOU HAVE BEEN MOVING AROUND A LOT MORE THAN USUAL: NOT AT ALL

## 2024-07-09 ASSESSMENT — ANXIETY QUESTIONNAIRES
2. NOT BEING ABLE TO STOP OR CONTROL WORRYING: SEVERAL DAYS
7. FEELING AFRAID AS IF SOMETHING AWFUL MIGHT HAPPEN: NOT AT ALL
5. BEING SO RESTLESS THAT IT IS HARD TO SIT STILL: NOT AT ALL
4. TROUBLE RELAXING: SEVERAL DAYS
GAD7 TOTAL SCORE: 5
3. WORRYING TOO MUCH ABOUT DIFFERENT THINGS: SEVERAL DAYS
1. FEELING NERVOUS, ANXIOUS, OR ON EDGE: SEVERAL DAYS
IF YOU CHECKED OFF ANY PROBLEMS ON THIS QUESTIONNAIRE, HOW DIFFICULT HAVE THESE PROBLEMS MADE IT FOR YOU TO DO YOUR WORK, TAKE CARE OF THINGS AT HOME, OR GET ALONG WITH OTHER PEOPLE: NOT DIFFICULT AT ALL
6. BECOMING EASILY ANNOYED OR IRRITABLE: SEVERAL DAYS

## 2024-07-09 ASSESSMENT — ENCOUNTER SYMPTOMS
LOSS OF SENSATION IN FEET: 0
DEPRESSION: 1
OCCASIONAL FEELINGS OF UNSTEADINESS: 0

## 2024-07-09 NOTE — PROGRESS NOTES
"Subjective   Patient ID: Diana Azul is a 84 y.o. female who presents for Follow-up (6 month follow up. ).    HPI  Accompanied by her daughter, Ivana, today.    HYPERLIPIDEMIA:  Patient is tolerating regular statin dosing without muscle achiness or weakness.  Review of last fasting lipids with good control.  Lab Results   Component Value Date    CHOL 147 07/09/2024     Lab Results   Component Value Date    HDL 46.8 07/09/2024     Lab Results   Component Value Date    LDLCALC 76 07/09/2024     Lab Results   Component Value Date    TRIG 120 07/09/2024     No components found for: \"CHOLHDL\"       MEMORY ISSUES: she is concerned that sh is more forgetful  Difficulty finding words at times.  Her daughter does not notice significant decline - she does notice that when her mom gets more flustered when she can't think of a word, but once she is calm is much better    DEPRESSION: followed by our behavioral health team and seems to be doing well  Encouraging her to branch out with her activities - adding more social activities is encouraged.    ELEVATED BMI:  she and her daughter ar on a supplement and diet program  She has lost almost 30 pounds since December and feeling much improved.  Cutting out alcohol to follow dietary guidelines for her program    Review of Systems    Review of Systems negative except as noted in HPI and Chief complaint.     Objective               VITALS:  /70 (BP Location: Left arm, Patient Position: Sitting, BP Cuff Size: Adult)   Pulse 75   SpO2 98%      Physical Exam  Constitutional:       General: She is not in acute distress.     Appearance: Normal appearance. She is not ill-appearing.   HENT:      Head: Normocephalic and atraumatic.   Neck:      Vascular: No carotid bruit.   Cardiovascular:      Rate and Rhythm: Normal rate and regular rhythm.      Pulses: Normal pulses.      Heart sounds: Normal heart sounds. No murmur heard.     No gallop.   Pulmonary:      Effort: Pulmonary " effort is normal.      Breath sounds: Normal breath sounds. No wheezing, rhonchi or rales.   Musculoskeletal:      Cervical back: Normal range of motion and neck supple. No rigidity or tenderness.   Lymphadenopathy:      Cervical: No cervical adenopathy.   Skin:     General: Skin is warm and dry.   Neurological:      Mental Status: She is alert.   Psychiatric:         Mood and Affect: Mood normal.         Behavior: Behavior normal.         Assessment/Plan   Problem List Items Addressed This Visit       Depression, major, in partial remission (CMS-HCC)     Stable - continue with current Citalopram and Bupropion dosage.  Encouraging socialization activities and continued follow up with Behavioral Health Collaborative Care team.  Follow up appointment in our office in 3-6 months.         Hyperlipidemia     Stable on Simvastatin.  Continue at current dosage.  Rechecking lipids at least annually.         Relevant Medications    simvastatin (Zocor) 40 mg tablet    Other Relevant Orders    Lipid Panel (Completed)    Adult hypothyroidism     Stable - continue with current Levothyroxine dosage.  Repeat labs in 6 months.         Relevant Medications    levothyroxine (Synthroid, Levoxyl) 25 mcg tablet     Other Visit Diagnoses       Anxiety disorder, unspecified    -  Primary    Relevant Medications    citalopram (CeleXA) 20 mg tablet    Other specified disorders of eustachian tube, bilateral        Relevant Medications    fluticasone (Flonase) 50 mcg/actuation nasal spray            FOLLOW UP  3-6 months , SOONER WITH ANY PROBLEMS OR CONCERNS.

## 2024-07-09 NOTE — PROGRESS NOTES
"Collaborative Care (CoCM)  Progress Note    Type of Interaction: In Office    Start Time: 9:45 AM    End Time: 10:40 AM    Appointment: Scheduled    Reason for Visit:   Chief Complaint   Patient presents with    Follow-up      Interval History / Patient Symptoms:   Subjective   Diana Azul is a 84 y.o. female who presents for follow up for Collaborative Care services.     Patient Health Questionnaire-9 Score: 5 (7/9/2024 10:52 AM)  ROJELIO-7 Total Score: 5 (7/9/2024 10:51 AM)     Interventions Provided: Problem Solving Treatment, Behavioral Activation, and Solution Focused Therapy    Progress Made: Slight    Response to Intervention:  We discussed the following elements from the last appointment:   Pt's daughter accompanied her to appointment today.  Discussed patient's 'glass half full' attitude and often saying \"I can't\", as well as lack of motivation to accomplish tasks/activities.    Pt recently went to a wedding out of town and enjoyed her time, but feels some guilt that her family has to support her monetarily at times.    Pt continues to be anxious, often thinking ahead about 'what if' scenarios.  Discussed that patient has to have motivation and want to change her ways of thinking and doing things.  Pt daughter is going to work with patient to have activities identified.    Plan:   Call friend to go to lunch, try to walk a few times/week  Follow up in 3 weeks; determine frequency and continued treatment next appointment    Follow Up / Next Appointment: Next appointment: 07/30/24   "

## 2024-07-12 RX ORDER — FAMOTIDINE 40 MG/1
40 TABLET, FILM COATED ORAL 2 TIMES DAILY
Qty: 180 TABLET | Refills: 1 | Status: SHIPPED | OUTPATIENT
Start: 2024-07-12

## 2024-07-15 ASSESSMENT — LIFESTYLE VARIABLES
SKIP TO QUESTIONS 9-10: 1
AUDIT-C TOTAL SCORE: 1
HOW MANY STANDARD DRINKS CONTAINING ALCOHOL DO YOU HAVE ON A TYPICAL DAY: 1 OR 2
HOW OFTEN DO YOU HAVE A DRINK CONTAINING ALCOHOL: MONTHLY OR LESS
HOW OFTEN DO YOU HAVE SIX OR MORE DRINKS ON ONE OCCASION: NEVER

## 2024-07-15 NOTE — ASSESSMENT & PLAN NOTE
Stable - continue with current Citalopram and Bupropion dosage.  Encouraging socialization activities and continued follow up with Behavioral Health Collaborative Care team.  Follow up appointment in our office in 3-6 months.

## 2024-07-18 DIAGNOSIS — K21.9 GASTRO-ESOPHAGEAL REFLUX DISEASE WITHOUT ESOPHAGITIS: ICD-10-CM

## 2024-07-19 RX ORDER — OMEPRAZOLE 40 MG/1
40 CAPSULE, DELAYED RELEASE ORAL DAILY
Qty: 90 CAPSULE | Refills: 1 | Status: SHIPPED | OUTPATIENT
Start: 2024-07-19

## 2024-07-30 ENCOUNTER — APPOINTMENT (OUTPATIENT)
Dept: PRIMARY CARE | Facility: CLINIC | Age: 84
End: 2024-07-30
Payer: MEDICARE

## 2024-07-30 DIAGNOSIS — F33.41 RECURRENT MAJOR DEPRESSIVE DISORDER, IN PARTIAL REMISSION (CMS-HCC): Primary | ICD-10-CM

## 2024-07-30 ASSESSMENT — PATIENT HEALTH QUESTIONNAIRE - PHQ9
8. MOVING OR SPEAKING SO SLOWLY THAT OTHER PEOPLE COULD HAVE NOTICED. OR THE OPPOSITE, BEING SO FIGETY OR RESTLESS THAT YOU HAVE BEEN MOVING AROUND A LOT MORE THAN USUAL: NOT AT ALL
3. TROUBLE FALLING OR STAYING ASLEEP: NOT AT ALL
7. TROUBLE CONCENTRATING ON THINGS, SUCH AS READING THE NEWSPAPER OR WATCHING TELEVISION: SEVERAL DAYS
1. LITTLE INTEREST OR PLEASURE IN DOING THINGS: SEVERAL DAYS
4. FEELING TIRED OR HAVING LITTLE ENERGY: NOT AT ALL
10. IF YOU CHECKED OFF ANY PROBLEMS, HOW DIFFICULT HAVE THESE PROBLEMS MADE IT FOR YOU TO DO YOUR WORK, TAKE CARE OF THINGS AT HOME, OR GET ALONG WITH OTHER PEOPLE: NOT DIFFICULT AT ALL
2. FEELING DOWN, DEPRESSED OR HOPELESS: SEVERAL DAYS
6. FEELING BAD ABOUT YOURSELF - OR THAT YOU ARE A FAILURE OR HAVE LET YOURSELF OR YOUR FAMILY DOWN: NOT AT ALL
9. THOUGHTS THAT YOU WOULD BE BETTER OFF DEAD, OR OF HURTING YOURSELF: NOT AT ALL
SUM OF ALL RESPONSES TO PHQ9 QUESTIONS 1 & 2: 2
5. POOR APPETITE OR OVEREATING: NOT AT ALL
SUM OF ALL RESPONSES TO PHQ QUESTIONS 1-9: 3

## 2024-07-30 ASSESSMENT — ANXIETY QUESTIONNAIRES
5. BEING SO RESTLESS THAT IT IS HARD TO SIT STILL: NOT AT ALL
IF YOU CHECKED OFF ANY PROBLEMS ON THIS QUESTIONNAIRE, HOW DIFFICULT HAVE THESE PROBLEMS MADE IT FOR YOU TO DO YOUR WORK, TAKE CARE OF THINGS AT HOME, OR GET ALONG WITH OTHER PEOPLE: NOT DIFFICULT AT ALL
4. TROUBLE RELAXING: NOT AT ALL
GAD7 TOTAL SCORE: 0
7. FEELING AFRAID AS IF SOMETHING AWFUL MIGHT HAPPEN: NOT AT ALL
6. BECOMING EASILY ANNOYED OR IRRITABLE: NOT AT ALL
3. WORRYING TOO MUCH ABOUT DIFFERENT THINGS: NOT AT ALL
2. NOT BEING ABLE TO STOP OR CONTROL WORRYING: NOT AT ALL
1. FEELING NERVOUS, ANXIOUS, OR ON EDGE: NOT AT ALL

## 2024-07-30 NOTE — PROGRESS NOTES
Collaborative Care (CoCM)  Progress Note    Type of Interaction: In Office    Start Time: 11:00 AM    End Time: 11:50 AM    Appointment: Scheduled    Reason for Visit:   Chief Complaint   Patient presents with    Follow-up      Interval History / Patient Symptoms:   Subjective   Diana Azul is a 84 y.o. female who presents for follow up for Collaborative Care services.   Patient Health Questionnaire-9 Score: 3 (7/30/2024 11:51 AM)  ROJELIO-7 Total Score: 0 (7/30/2024 11:51 AM)     Interventions Provided: Problem Solving Treatment and Behavioral Activation    Progress Made: Moderate    Response to Intervention:  We discussed the following elements from the last appointment:   Friend passed away recently- able to see her one last time.   Patient brought her schedule of activities she has completed since previous appointment.  Patient acknowledges more energy and motivation to do activities when she keeps busy.  Pt also has more motivation to move as she has been able to loose weight and wants to keep the weight off.    Discussed patient sometimes being nervous to initiate having lunch or dinner with a friend, but needing to work through the uncomfortable and just do it.    Is going to start attending [employer] [facility].      Plan:   [employer]- send check  Asking friends for lunch/dinner  Continue to complete activities calendar    Follow Up / Next Appointment: Next appointment: 08/27/24

## 2024-07-31 ENCOUNTER — DOCUMENTATION (OUTPATIENT)
Dept: PRIMARY CARE | Facility: CLINIC | Age: 84
End: 2024-07-31
Payer: MEDICARE

## 2024-07-31 DIAGNOSIS — F33.41 RECURRENT MAJOR DEPRESSIVE DISORDER, IN PARTIAL REMISSION (CMS-HCC): Primary | ICD-10-CM

## 2024-07-31 PROCEDURE — 99494 1ST/SBSQ PSYC COLLAB CARE: CPT | Performed by: FAMILY MEDICINE

## 2024-07-31 PROCEDURE — 99493 SBSQ PSYC COLLAB CARE MGMT: CPT | Performed by: FAMILY MEDICINE

## 2024-08-09 ENCOUNTER — TELEPHONE (OUTPATIENT)
Dept: SURGICAL ONCOLOGY | Facility: HOSPITAL | Age: 84
End: 2024-08-09
Payer: MEDICARE

## 2024-08-09 NOTE — TELEPHONE ENCOUNTER
LEFT PATIENT MESSAGE IN REGARDING TO REFERRAL FOR CONSULT FOR MELANOMA OF THE LEFT LATERAL SHOULDER. LEFT OFFICE NUMBER ON HER VM.

## 2024-08-16 ENCOUNTER — LAB (OUTPATIENT)
Dept: LAB | Facility: HOSPITAL | Age: 84
End: 2024-08-16
Payer: MEDICARE

## 2024-08-16 ENCOUNTER — OFFICE VISIT (OUTPATIENT)
Dept: SURGICAL ONCOLOGY | Facility: HOSPITAL | Age: 84
End: 2024-08-16
Payer: MEDICARE

## 2024-08-16 ENCOUNTER — LAB REQUISITION (OUTPATIENT)
Dept: DERMATOPATHOLOGY | Facility: CLINIC | Age: 84
End: 2024-08-16
Payer: MEDICARE

## 2024-08-16 VITALS
BODY MASS INDEX: 27.04 KG/M2 | RESPIRATION RATE: 16 BRPM | HEART RATE: 82 BPM | WEIGHT: 157.63 LBS | DIASTOLIC BLOOD PRESSURE: 74 MMHG | OXYGEN SATURATION: 98 % | TEMPERATURE: 97 F | SYSTOLIC BLOOD PRESSURE: 141 MMHG

## 2024-08-16 DIAGNOSIS — C43.62 MALIGNANT MELANOMA OF LEFT SHOULDER (MULTI): Primary | ICD-10-CM

## 2024-08-16 DIAGNOSIS — C43.62 MALIGNANT MELANOMA OF LEFT UPPER LIMB, INCLUDING SHOULDER (MULTI): ICD-10-CM

## 2024-08-16 DIAGNOSIS — C43.62 MALIGNANT MELANOMA OF LEFT SHOULDER (MULTI): ICD-10-CM

## 2024-08-16 LAB
ANION GAP SERPL CALC-SCNC: 15 MMOL/L (ref 10–20)
BASOPHILS # BLD AUTO: 0.06 X10*3/UL (ref 0–0.1)
BASOPHILS NFR BLD AUTO: 0.4 %
BUN SERPL-MCNC: 23 MG/DL (ref 6–23)
CALCIUM SERPL-MCNC: 9.4 MG/DL (ref 8.6–10.3)
CHLORIDE SERPL-SCNC: 106 MMOL/L (ref 98–107)
CO2 SERPL-SCNC: 23 MMOL/L (ref 21–32)
CREAT SERPL-MCNC: 0.69 MG/DL (ref 0.5–1.05)
EGFRCR SERPLBLD CKD-EPI 2021: 85 ML/MIN/1.73M*2
EOSINOPHIL # BLD AUTO: 0.04 X10*3/UL (ref 0–0.4)
EOSINOPHIL NFR BLD AUTO: 0.2 %
ERYTHROCYTE [DISTWIDTH] IN BLOOD BY AUTOMATED COUNT: 14.9 % (ref 11.5–14.5)
ERYTHROCYTE [DISTWIDTH] IN BLOOD BY AUTOMATED COUNT: 14.9 % (ref 11.5–14.5)
GLUCOSE SERPL-MCNC: 126 MG/DL (ref 74–99)
HCT VFR BLD AUTO: 36.8 % (ref 36–46)
HCT VFR BLD AUTO: 36.8 % (ref 36–46)
HGB BLD-MCNC: 12.1 G/DL (ref 12–16)
HGB BLD-MCNC: 12.1 G/DL (ref 12–16)
IMM GRANULOCYTES # BLD AUTO: 0.08 X10*3/UL (ref 0–0.5)
IMM GRANULOCYTES NFR BLD AUTO: 0.5 % (ref 0–0.9)
LYMPHOCYTES # BLD AUTO: 9.49 X10*3/UL (ref 0.8–3)
LYMPHOCYTES NFR BLD AUTO: 55.6 %
MCH RBC QN AUTO: 30.1 PG (ref 26–34)
MCH RBC QN AUTO: 30.1 PG (ref 26–34)
MCHC RBC AUTO-ENTMCNC: 32.9 G/DL (ref 32–36)
MCHC RBC AUTO-ENTMCNC: 32.9 G/DL (ref 32–36)
MCV RBC AUTO: 92 FL (ref 80–100)
MCV RBC AUTO: 92 FL (ref 80–100)
MONOCYTES # BLD AUTO: 0.69 X10*3/UL (ref 0.05–0.8)
MONOCYTES NFR BLD AUTO: 4 %
NEUTROPHILS # BLD AUTO: 6.72 X10*3/UL (ref 1.6–5.5)
NEUTROPHILS NFR BLD AUTO: 39.3 %
NRBC BLD-RTO: 0 /100 WBCS (ref 0–0)
NRBC BLD-RTO: 0 /100 WBCS (ref 0–0)
PLATELET # BLD AUTO: 186 X10*3/UL (ref 150–450)
PLATELET # BLD AUTO: 186 X10*3/UL (ref 150–450)
POTASSIUM SERPL-SCNC: 4 MMOL/L (ref 3.5–5.3)
RBC # BLD AUTO: 4.02 X10*6/UL (ref 4–5.2)
RBC # BLD AUTO: 4.02 X10*6/UL (ref 4–5.2)
SODIUM SERPL-SCNC: 140 MMOL/L (ref 136–145)
WBC # BLD AUTO: 17.2 X10*3/UL (ref 4.4–11.3)
WBC # BLD AUTO: 17.2 X10*3/UL (ref 4.4–11.3)

## 2024-08-16 PROCEDURE — 1036F TOBACCO NON-USER: CPT | Performed by: SURGERY

## 2024-08-16 PROCEDURE — 1125F AMNT PAIN NOTED PAIN PRSNT: CPT | Performed by: SURGERY

## 2024-08-16 PROCEDURE — 36415 COLL VENOUS BLD VENIPUNCTURE: CPT

## 2024-08-16 PROCEDURE — 80048 BASIC METABOLIC PNL TOTAL CA: CPT

## 2024-08-16 PROCEDURE — 99205 OFFICE O/P NEW HI 60 MIN: CPT | Performed by: SURGERY

## 2024-08-16 PROCEDURE — 99215 OFFICE O/P EST HI 40 MIN: CPT | Mod: 57 | Performed by: SURGERY

## 2024-08-16 PROCEDURE — 1157F ADVNC CARE PLAN IN RCRD: CPT | Performed by: SURGERY

## 2024-08-16 PROCEDURE — 1159F MED LIST DOCD IN RCRD: CPT | Performed by: SURGERY

## 2024-08-16 PROCEDURE — 85027 COMPLETE CBC AUTOMATED: CPT

## 2024-08-16 PROCEDURE — 1123F ACP DISCUSS/DSCN MKR DOCD: CPT | Performed by: SURGERY

## 2024-08-16 RX ORDER — SODIUM CHLORIDE, SODIUM LACTATE, POTASSIUM CHLORIDE, CALCIUM CHLORIDE 600; 310; 30; 20 MG/100ML; MG/100ML; MG/100ML; MG/100ML
20 INJECTION, SOLUTION INTRAVENOUS CONTINUOUS
OUTPATIENT
Start: 2024-08-16

## 2024-08-16 ASSESSMENT — PAIN SCALES - GENERAL: PAINLEVEL: 2

## 2024-08-16 NOTE — H&P (VIEW-ONLY)
History and Physical    Referring Provider:  Patti Durán MD Kim M Robusto, DO    Chief Complaint:  No chief complaint on file.      History of Present Illness:  This is a 84 y.o. 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 Noted a change of that lesion over the month prior to biopsy. No bleeding or trauma. TBSE was not performed at that time, but the patient has no other concerning lesions at this time.     Past Medical History:  Past Medical History:  10/03/2013: Personal history of other mental and behavioral disorders      Comment:  History of anxiety disorder   IBS    Past Surgical History:  Bilateral TKA    Medications:  Current Outpatient Medications   Medication Instructions    albuterol (ProAir HFA) 90 mcg/actuation inhaler 2 puffs, inhalation, Every 4 hours PRN    aspirin 81 mg EC tablet oral    buPROPion XL (Wellbutrin XL) 150 mg 24 hr tablet TAKE 1 TABLET BY MOUTH EVERY DAY    cholecalciferol (Vitamin D-3) 50 MCG (2000 UT) tablet oral    citalopram (CELEXA) 20 mg, oral, Daily    cyanocobalamin, vitamin B-12, (VITAMIN B-12 ORAL) oral, TH Vitamin B12 TABS    famotidine (PEPCID) 40 mg, oral, 2 times daily    fluticasone (Flonase) 50 mcg/actuation nasal spray 2 sprays, Each Nostril, Daily, Shake gently. Before first use, prime pump. After use, clean tip and replace cap.    levothyroxine (SYNTHROID, LEVOXYL) 25 mcg, oral, Daily, as directed    magnesium oxide (Mag-Ox) 400 mg tablet oral    multivitamin (Daily Multi-Vitamin) tablet oral    omeprazole (PRILOSEC) 40 mg, oral, Daily, Do not crush or chew.    propranolol (Inderal) 10 mg tablet TAKE 1 TABLET BY MOUTH EVERY DAY AS NEEDED    simvastatin (ZOCOR) 40 mg, oral, Daily        Allergies:  No Known Allergies     Family History:  family history includes Heart attack in her father; Heart disease in her brother and father.   Daughter with Breast Cancer    Social History:    "reports that she has never smoked. She has never used smokeless tobacco. She reports current alcohol use of about 1.0 standard drink of alcohol per week. She reports that she does not use drugs.   Lives in [place] independently. Daughters are her support system.     Review of Systems:  A complete 12 point review of systems was performed and is negative except as noted in the history of present illness.    Vital Signs:  Vitals:    08/16/24 0938   BP: 141/74   Pulse: 82   Resp: 16   Temp: 36.1 °C (97 °F)   SpO2: 98%        Physical Exam:  GEN: No acute distress, Healthy appearing  HEENT: Moist mucus membranes, normocephalic  CARDS: RRR  PULM: No respiratory distress  GI: Soft, non-distended  LYMPH: No palpable lymphadenopathy in the left cervical and axillary basins  SKIN: Left shoulder biopsy site without residual disease   NEURO: No gross sensorimotor deficits  EXT: No arm or leg swelling    Laboratory Values:  Lab Results   Component Value Date    WBC 17.2 (H) 08/16/2024    HGB 12.1 08/16/2024    HCT 36.8 08/16/2024    MCV 92 08/16/2024     08/16/2024        Chemistry    Lab Results   Component Value Date/Time     08/16/2024 1054    K 4.0 08/16/2024 1054     08/16/2024 1054    CO2 23 08/16/2024 1054    BUN 23 08/16/2024 1054    CREATININE 0.69 08/16/2024 1054    Lab Results   Component Value Date/Time    CALCIUM 9.4 08/16/2024 1054    ALKPHOS 59 03/25/2024 1013    AST 21 03/25/2024 1013    ALT 28 03/25/2024 1013    BILITOT 0.5 03/25/2024 1013           No results found for: \"PR1\"      Imaging:  I have personally reviewed the images and the radiologist's report.  No images are attached to the encounter or orders placed in the encounter.     Assessment:  This is a 84 y.o. female who presents with a left shoudler melanoma that is at least 3.7mm in Breslow depth and non-ulcerated. No clinically palpable lymphadenopathy. Recommended for wide excision and sentinel node biopsy. Understands the risks and " benefits. Offered participation in the SWOG  clinical trial and she is interested.     Plan:  -- Wide excision of the left shoulder melanoma with 2cm margins and SLNB  -- Consider enrollment in SWOG   -- Labs/EKG  -- Informed consent obtained  -- The patient asked very appropriate questions that were answered to the best of my ability with the current information at hand. She knows to call with any questions or concerns that arise    Kulwinder Juarez MD, MPH

## 2024-08-16 NOTE — PROGRESS NOTES
History and Physical    Referring Provider:  Patti Durán MD Kim M Robusto, DO    Chief Complaint:  No chief complaint on file.      History of Present Illness:  This is a 84 y.o. 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 Noted a change of that lesion over the month prior to biopsy. No bleeding or trauma. TBSE was not performed at that time, but the patient has no other concerning lesions at this time.     Past Medical History:  Past Medical History:  10/03/2013: Personal history of other mental and behavioral disorders      Comment:  History of anxiety disorder   IBS    Past Surgical History:  Bilateral TKA    Medications:  Current Outpatient Medications   Medication Instructions    albuterol (ProAir HFA) 90 mcg/actuation inhaler 2 puffs, inhalation, Every 4 hours PRN    aspirin 81 mg EC tablet oral    buPROPion XL (Wellbutrin XL) 150 mg 24 hr tablet TAKE 1 TABLET BY MOUTH EVERY DAY    cholecalciferol (Vitamin D-3) 50 MCG (2000 UT) tablet oral    citalopram (CELEXA) 20 mg, oral, Daily    cyanocobalamin, vitamin B-12, (VITAMIN B-12 ORAL) oral, TH Vitamin B12 TABS    famotidine (PEPCID) 40 mg, oral, 2 times daily    fluticasone (Flonase) 50 mcg/actuation nasal spray 2 sprays, Each Nostril, Daily, Shake gently. Before first use, prime pump. After use, clean tip and replace cap.    levothyroxine (SYNTHROID, LEVOXYL) 25 mcg, oral, Daily, as directed    magnesium oxide (Mag-Ox) 400 mg tablet oral    multivitamin (Daily Multi-Vitamin) tablet oral    omeprazole (PRILOSEC) 40 mg, oral, Daily, Do not crush or chew.    propranolol (Inderal) 10 mg tablet TAKE 1 TABLET BY MOUTH EVERY DAY AS NEEDED    simvastatin (ZOCOR) 40 mg, oral, Daily        Allergies:  No Known Allergies     Family History:  family history includes Heart attack in her father; Heart disease in her brother and father.   Daughter with Breast Cancer    Social History:    "reports that she has never smoked. She has never used smokeless tobacco. She reports current alcohol use of about 1.0 standard drink of alcohol per week. She reports that she does not use drugs.   Lives in [place] independently. Daughters are her support system.     Review of Systems:  A complete 12 point review of systems was performed and is negative except as noted in the history of present illness.    Vital Signs:  Vitals:    08/16/24 0938   BP: 141/74   Pulse: 82   Resp: 16   Temp: 36.1 °C (97 °F)   SpO2: 98%        Physical Exam:  GEN: No acute distress, Healthy appearing  HEENT: Moist mucus membranes, normocephalic  CARDS: RRR  PULM: No respiratory distress  GI: Soft, non-distended  LYMPH: No palpable lymphadenopathy in the left cervical and axillary basins  SKIN: Left shoulder biopsy site without residual disease   NEURO: No gross sensorimotor deficits  EXT: No arm or leg swelling    Laboratory Values:  Lab Results   Component Value Date    WBC 17.2 (H) 08/16/2024    HGB 12.1 08/16/2024    HCT 36.8 08/16/2024    MCV 92 08/16/2024     08/16/2024        Chemistry    Lab Results   Component Value Date/Time     08/16/2024 1054    K 4.0 08/16/2024 1054     08/16/2024 1054    CO2 23 08/16/2024 1054    BUN 23 08/16/2024 1054    CREATININE 0.69 08/16/2024 1054    Lab Results   Component Value Date/Time    CALCIUM 9.4 08/16/2024 1054    ALKPHOS 59 03/25/2024 1013    AST 21 03/25/2024 1013    ALT 28 03/25/2024 1013    BILITOT 0.5 03/25/2024 1013           No results found for: \"PR1\"      Imaging:  I have personally reviewed the images and the radiologist's report.  No images are attached to the encounter or orders placed in the encounter.     Assessment:  This is a 84 y.o. female who presents with a left shoudler melanoma that is at least 3.7mm in Breslow depth and non-ulcerated. No clinically palpable lymphadenopathy. Recommended for wide excision and sentinel node biopsy. Understands the risks and " benefits. Offered participation in the SWOG  clinical trial and she is interested.     Plan:  -- Wide excision of the left shoulder melanoma with 2cm margins and SLNB  -- Consider enrollment in SWOG   -- Labs/EKG  -- Informed consent obtained  -- The patient asked very appropriate questions that were answered to the best of my ability with the current information at hand. She knows to call with any questions or concerns that arise    Kulwinder Juarez MD, MPH

## 2024-08-19 ENCOUNTER — DOCUMENTATION (OUTPATIENT)
Dept: HEMATOLOGY/ONCOLOGY | Facility: HOSPITAL | Age: 84
End: 2024-08-19
Payer: MEDICARE

## 2024-08-19 DIAGNOSIS — C43.62 MALIGNANT MELANOMA OF LEFT SHOULDER (MULTI): ICD-10-CM

## 2024-08-19 DIAGNOSIS — D72.829 LEUKOCYTOSIS, UNSPECIFIED TYPE: ICD-10-CM

## 2024-08-19 NOTE — PROGRESS NOTES
Research Note     Diana Azul was called today to discuss , per referral from Dr. Juarez.  Discussed  with patient, she states she is interested in the study. Consent sent to her email at [email or web address].  Will follow up with patient. Patient surgical date is 9/4/24.      Education Documentation  No documentation found.  Education Comments  No comments found.

## 2024-08-20 LAB
PATH REPORT.FINAL DX SPEC: NORMAL
PATH REPORT.GROSS SPEC: NORMAL
PATH REPORT.RELEVANT HX SPEC: NORMAL
PATH REPORT.TOTAL CANCER: NORMAL
PATHOLOGY SYNOPTIC REPORT: NORMAL

## 2024-08-26 ENCOUNTER — TUMOR BOARD CONFERENCE (OUTPATIENT)
Dept: HEMATOLOGY/ONCOLOGY | Facility: HOSPITAL | Age: 84
End: 2024-08-26
Payer: MEDICARE

## 2024-08-26 DIAGNOSIS — C43.62 MALIGNANT MELANOMA OF LEFT SHOULDER (MULTI): Primary | ICD-10-CM

## 2024-08-26 NOTE — TUMOR BOARD NOTE
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 Moderately transected on the deep margin    Recommendation:  WLE with 2 cm margins and SLNB.    Review Multidisciplinary Cutaneous Oncology Conference recommendation with patient.  Continue routine follow up and total body skin exams with Dermatology.    Follow Up:  Patti Rothman      History and Physical Exam  Dermatologic History:   84 y.o. female with a biopsy of the left lateral shoulder on 7/31/2024 showing a non-ulcerated melanoma,  spindle cell  type, Breslow: at least 3.6 mm. Moderately transected on the deep margin    Past Medical History:    Past Medical History:   Diagnosis Date    Personal history of other mental and behavioral disorders 10/03/2013    History of anxiety disorder       Family History of DNS/MM:  Unknown    Skin:  Left shoulder biopsy site without residual disease     Lymphatic:  No LAD noted      Pathology  Derm Consult: [id number]   BX: 7/31/2024    3 SLIDES, [facility], #[id number] (BX: 07/31/2024)     SKIN, LEFT LATERAL SHOULDER, SHAVE REMOVAL:  MALIGNANT MELANOMA, BRESLOW THICKNESS AT LEAST 3.6 MM, PRESENT ON THE DEEP MARGIN, SEE NOTE.     Note: Microscopic examination reveals a specimen that extends into the subcutaneous fat. There is an asymmetric proliferation of nested and single atypical melanocytes along the dermal-epidermal junction in a continuous or near continuous fashion. There are fascicles of spindle cells in the dermis with a patchy perivascular lymphocytic infiltrate. The spindle cells stain with antibodies against  SOX-10 as do the epidermal melanocytes.      ** Electronically signed out by Mary Ellen Heller MD **  SPECIMEN   Procedure  Biopsy, shave   Specimen Laterality  Left   TUMOR   Tumor Site  Skin of upper limb and shoulder: Left lateral shoulder        Histologic Type  It has features of a spindle cell melanoma.   Maximum Tumor (Breslow) Thickness (Millimeters)  At least: 3.6 mm     Moderately transected on the deep margin.   Ulceration  Not identified   Anatomic (Steve) Level  At least level: V     Moderately transected on the deep margin.   Mitotic Rate  None identified   Microsatellite(s)  Not identified   Lymphovascular Invasion  Not identified   Neurotropism  Not identified   Tumor-Infiltrating Lymphocytes  Present, nonbrisk   Tumor Regression  Not identified   MARGINS     Margin Status for Invasive Melanoma  Invasive melanoma present at margin   Margin(s) Involved by Invasive Melanoma  Deep   Margin Status for Melanoma in situ  All margins negative for melanoma in situ   PATHOLOGIC STAGE CLASSIFICATION (pTNM, AJCC 8th Edition)     pT Category  pT3a       Radiology      Clinical Images  8/9/2024

## 2024-08-27 ENCOUNTER — APPOINTMENT (OUTPATIENT)
Dept: PRIMARY CARE | Facility: CLINIC | Age: 84
End: 2024-08-27
Payer: MEDICARE

## 2024-08-27 ENCOUNTER — HOSPITAL ENCOUNTER (OUTPATIENT)
Dept: CARDIOLOGY | Facility: CLINIC | Age: 84
Discharge: HOME | End: 2024-08-27
Payer: MEDICARE

## 2024-08-27 DIAGNOSIS — F33.41 RECURRENT MAJOR DEPRESSIVE DISORDER, IN PARTIAL REMISSION (CMS-HCC): Primary | ICD-10-CM

## 2024-08-27 DIAGNOSIS — C43.62 MALIGNANT MELANOMA OF LEFT SHOULDER (MULTI): ICD-10-CM

## 2024-08-27 PROCEDURE — 93010 ELECTROCARDIOGRAM REPORT: CPT | Performed by: INTERNAL MEDICINE

## 2024-08-27 PROCEDURE — 93005 ELECTROCARDIOGRAM TRACING: CPT

## 2024-08-28 LAB
ANION GAP SERPL CALC-SCNC: 15 MMOL/L (ref 10–20)
ATRIAL RATE: 62 BPM
BUN SERPL-MCNC: 23 MG/DL (ref 6–23)
CALCIUM SERPL-MCNC: 9.4 MG/DL (ref 8.6–10.3)
CHLORIDE SERPL-SCNC: 106 MMOL/L (ref 98–107)
CO2 SERPL-SCNC: 23 MMOL/L (ref 21–32)
CREAT SERPL-MCNC: 0.69 MG/DL (ref 0.5–1.05)
EGFRCR SERPLBLD CKD-EPI 2021: 85 ML/MIN/1.73M*2
GLUCOSE SERPL-MCNC: 126 MG/DL (ref 74–99)
P OFFSET: 193 MS
P ONSET: 152 MS
POTASSIUM SERPL-SCNC: 4 MMOL/L (ref 3.5–5.3)
PR INTERVAL: 136 MS
Q ONSET: 220 MS
QRS COUNT: 10 BEATS
QRS DURATION: 84 MS
QT INTERVAL: 428 MS
QTC CALCULATION(BAZETT): 434 MS
QTC FREDERICIA: 432 MS
R AXIS: 38 DEGREES
SODIUM SERPL-SCNC: 140 MMOL/L (ref 136–145)
T AXIS: 59 DEGREES
T OFFSET: 434 MS
VENTRICULAR RATE: 62 BPM

## 2024-08-28 ASSESSMENT — PATIENT HEALTH QUESTIONNAIRE - PHQ9
1. LITTLE INTEREST OR PLEASURE IN DOING THINGS: NOT AT ALL
2. FEELING DOWN, DEPRESSED OR HOPELESS: SEVERAL DAYS
4. FEELING TIRED OR HAVING LITTLE ENERGY: NOT AT ALL
8. MOVING OR SPEAKING SO SLOWLY THAT OTHER PEOPLE COULD HAVE NOTICED. OR THE OPPOSITE, BEING SO FIGETY OR RESTLESS THAT YOU HAVE BEEN MOVING AROUND A LOT MORE THAN USUAL: NOT AT ALL
SUM OF ALL RESPONSES TO PHQ QUESTIONS 1-9: 2
SUM OF ALL RESPONSES TO PHQ9 QUESTIONS 1 & 2: 1
6. FEELING BAD ABOUT YOURSELF - OR THAT YOU ARE A FAILURE OR HAVE LET YOURSELF OR YOUR FAMILY DOWN: NOT AT ALL
7. TROUBLE CONCENTRATING ON THINGS, SUCH AS READING THE NEWSPAPER OR WATCHING TELEVISION: SEVERAL DAYS
3. TROUBLE FALLING OR STAYING ASLEEP: NOT AT ALL
9. THOUGHTS THAT YOU WOULD BE BETTER OFF DEAD, OR OF HURTING YOURSELF: NOT AT ALL
10. IF YOU CHECKED OFF ANY PROBLEMS, HOW DIFFICULT HAVE THESE PROBLEMS MADE IT FOR YOU TO DO YOUR WORK, TAKE CARE OF THINGS AT HOME, OR GET ALONG WITH OTHER PEOPLE: NOT DIFFICULT AT ALL
5. POOR APPETITE OR OVEREATING: NOT AT ALL

## 2024-08-28 ASSESSMENT — ANXIETY QUESTIONNAIRES
6. BECOMING EASILY ANNOYED OR IRRITABLE: NOT AT ALL
4. TROUBLE RELAXING: NOT AT ALL
5. BEING SO RESTLESS THAT IT IS HARD TO SIT STILL: NOT AT ALL
3. WORRYING TOO MUCH ABOUT DIFFERENT THINGS: NOT AT ALL
GAD7 TOTAL SCORE: 6
2. NOT BEING ABLE TO STOP OR CONTROL WORRYING: MORE THAN HALF THE DAYS
7. FEELING AFRAID AS IF SOMETHING AWFUL MIGHT HAPPEN: MORE THAN HALF THE DAYS
1. FEELING NERVOUS, ANXIOUS, OR ON EDGE: MORE THAN HALF THE DAYS
IF YOU CHECKED OFF ANY PROBLEMS ON THIS QUESTIONNAIRE, HOW DIFFICULT HAVE THESE PROBLEMS MADE IT FOR YOU TO DO YOUR WORK, TAKE CARE OF THINGS AT HOME, OR GET ALONG WITH OTHER PEOPLE: VERY DIFFICULT

## 2024-08-29 ENCOUNTER — ANESTHESIA EVENT (OUTPATIENT)
Dept: OPERATING ROOM | Facility: HOSPITAL | Age: 84
End: 2024-08-29
Payer: MEDICARE

## 2024-08-29 ENCOUNTER — OFFICE VISIT (OUTPATIENT)
Dept: PRIMARY CARE | Facility: CLINIC | Age: 84
End: 2024-08-29
Payer: MEDICARE

## 2024-08-29 VITALS
WEIGHT: 155 LBS | HEART RATE: 81 BPM | BODY MASS INDEX: 26.59 KG/M2 | DIASTOLIC BLOOD PRESSURE: 71 MMHG | SYSTOLIC BLOOD PRESSURE: 131 MMHG | OXYGEN SATURATION: 98 % | TEMPERATURE: 98 F

## 2024-08-29 DIAGNOSIS — R21 RASH: Primary | ICD-10-CM

## 2024-08-29 DIAGNOSIS — L50.9 URTICARIA: ICD-10-CM

## 2024-08-29 PROCEDURE — 1123F ACP DISCUSS/DSCN MKR DOCD: CPT | Performed by: FAMILY MEDICINE

## 2024-08-29 PROCEDURE — 99213 OFFICE O/P EST LOW 20 MIN: CPT | Performed by: FAMILY MEDICINE

## 2024-08-29 PROCEDURE — 1036F TOBACCO NON-USER: CPT | Performed by: FAMILY MEDICINE

## 2024-08-29 PROCEDURE — 1159F MED LIST DOCD IN RCRD: CPT | Performed by: FAMILY MEDICINE

## 2024-08-29 PROCEDURE — 1157F ADVNC CARE PLAN IN RCRD: CPT | Performed by: FAMILY MEDICINE

## 2024-08-29 PROCEDURE — 1126F AMNT PAIN NOTED NONE PRSNT: CPT | Performed by: FAMILY MEDICINE

## 2024-08-29 RX ORDER — HYDROXYZINE HYDROCHLORIDE 25 MG/1
25 TABLET, FILM COATED ORAL 2 TIMES DAILY
Qty: 60 TABLET | Refills: 0 | Status: SHIPPED | OUTPATIENT
Start: 2024-08-29 | End: 2024-09-28

## 2024-08-29 RX ORDER — TRIAMCINOLONE ACETONIDE 1 MG/G
CREAM TOPICAL 2 TIMES DAILY
Qty: 30 G | Refills: 0 | Status: SHIPPED | OUTPATIENT
Start: 2024-08-29

## 2024-08-29 ASSESSMENT — PAIN SCALES - GENERAL: PAINLEVEL: 0-NO PAIN

## 2024-08-29 ASSESSMENT — ENCOUNTER SYMPTOMS
OCCASIONAL FEELINGS OF UNSTEADINESS: 0
LOSS OF SENSATION IN FEET: 0
DEPRESSION: 0

## 2024-08-29 NOTE — PROGRESS NOTES
Subjective   Patient ID: Diana Azul is a 84 y.o. female who presents for Rash (3 weeks ).    HPI    RASH:  started approximately 3 weeks ago  Itchy and scratching at night  NO new products or foods  She had traveled with her daughter and stayed in a hotel on vacation but her daughter is not currently itching  She had a pest control expert come in to check her mattress but no signs of bedbugs or other causes    She has upcoming surgical procedure for wide excision of left shoulder lesion scheduled for next Wednesday  No associated sypmtoms.    Review of Systems    Review of Systems negative except as noted in HPI and Chief complaint.     Objective   Patient Health Questionnaire-9 Score: 2           VITALS:  /71 (BP Location: Left arm, Patient Position: Sitting, BP Cuff Size: Small adult)   Pulse 81   Temp 36.7 °C (98 °F) (Temporal)   Wt 70.3 kg (155 lb)   SpO2 98%   BMI 26.59 kg/m²      Physical Exam  Constitutional:       General: She is not in acute distress.     Appearance: Normal appearance. She is not ill-appearing.   HENT:      Head: Normocephalic and atraumatic.   Neck:      Vascular: No carotid bruit.   Cardiovascular:      Rate and Rhythm: Normal rate and regular rhythm.      Pulses: Normal pulses.      Heart sounds: Normal heart sounds. No murmur heard.     No gallop.   Pulmonary:      Effort: Pulmonary effort is normal.      Breath sounds: Normal breath sounds. No wheezing, rhonchi or rales.   Musculoskeletal:      Cervical back: Normal range of motion and neck supple. No rigidity or tenderness.   Lymphadenopathy:      Cervical: No cervical adenopathy.   Skin:     General: Skin is warm and dry.      Findings: Rash (scattered papular lesions on arms, trunk, legs and back, no vesicular lesions no pustules) present.   Neurological:      Mental Status: She is alert.   Psychiatric:         Mood and Affect: Mood normal.         Behavior: Behavior normal.         Assessment/Plan   Problem List  Items Addressed This Visit    None  Visit Diagnoses       Rash    -  Primary    Antihistamine rx given.  Holding on steroids for now with upcoming procedure.  Topical agents recommended.    Urticaria        Relevant Medications    hydrOXYzine HCL (Atarax) 25 mg tablet    triamcinolone (Kenalog) 0.1 % cream            FOLLOW UP PRN,  WITH ANY PROBLEMS OR CONCERNS.

## 2024-08-29 NOTE — PATIENT INSTRUCTIONS
Atarax (Hydroxyzine) 25 mg - can take twice daily - might make you sleepy.  You could Allegra or Claritin during the day will also help without making you sleepy.    Triamcinolone cream twice daily as needed.    SARNA lotion - can use as needed - ask pharmacist if they have.

## 2024-08-30 ENCOUNTER — TELEPHONE (OUTPATIENT)
Dept: PREADMISSION TESTING | Facility: HOSPITAL | Age: 84
End: 2024-08-30

## 2024-08-30 ENCOUNTER — DOCUMENTATION (OUTPATIENT)
Dept: PRIMARY CARE | Facility: CLINIC | Age: 84
End: 2024-08-30
Payer: MEDICARE

## 2024-08-30 DIAGNOSIS — F33.41 RECURRENT MAJOR DEPRESSIVE DISORDER, IN PARTIAL REMISSION (CMS-HCC): Primary | ICD-10-CM

## 2024-08-30 NOTE — TELEPHONE ENCOUNTER
SURGERY PRE-OPERATIVE INSTRUCTIONS    *You will receive a phone call the day before your procedure  after 2pm, (or the Friday before your surgery if scheduled on a Monday.) Generally the hospital will be calling you with this information after that time.    *You are not to eat after midnight the night before the surgery. You may have 8oz of a clear liquid up until 2 hours prior to arriving to the hospital. The exception is with medications you were instructed to take day of surgery.    *You may take tylenol for pain/discomfort as needed.     *Stop taking all aspirin products, ibuprofen (motrin/advil), naproxen (aleve/naprosyn) for one week prior to surgery.    *Stop taking all vitamins and supplements one week prior to surgery.     *You should not have alcoholic beverages for 24 hours before surgery.     *You should not smoke 24 hours prior to surgery.     *To help prevent surgical infections bathe/shower with Dial soap the evening before surgery.    *You can wear deodorant but no lotion, powder, or perfume/cologne. You should remove all make-up and nail polish at home.    *If you wear glasses, please bring a case for the glasses with you.    *You will be asked to remove dentures and contacts.     *Please leave all valuables at home.    *You should wear loose, comfortable clothing that will accommodate bandages and/or casts.    *You should notify your doctor of any change in your condition (fever, cold, rash, etc). Surgery may need to be re-scheduled until a time you are in better health.    *A responsible adult is required to accompany you to and from the hospital if you are receiving anesthesia or a sedative. Patients are not permitted to drive for 24 hours after anesthesia.     *You can use the [employer] parking if you wish.     *If you have any further questions please call [phone]. Pt. Staed she stopped asa 1 week ago, will take regular meds am of or.  LT

## 2024-09-04 ENCOUNTER — HOSPITAL ENCOUNTER (OUTPATIENT)
Facility: HOSPITAL | Age: 84
Setting detail: OUTPATIENT SURGERY
Discharge: HOME | End: 2024-09-04
Attending: SURGERY | Admitting: SURGERY
Payer: MEDICARE

## 2024-09-04 ENCOUNTER — HOSPITAL ENCOUNTER (OUTPATIENT)
Dept: RADIOLOGY | Facility: HOSPITAL | Age: 84
Setting detail: OUTPATIENT SURGERY
Discharge: HOME | End: 2024-09-04
Payer: MEDICARE

## 2024-09-04 ENCOUNTER — PHARMACY VISIT (OUTPATIENT)
Dept: PHARMACY | Facility: CLINIC | Age: 84
End: 2024-09-04
Payer: MEDICARE

## 2024-09-04 ENCOUNTER — ANESTHESIA (OUTPATIENT)
Dept: OPERATING ROOM | Facility: HOSPITAL | Age: 84
End: 2024-09-04
Payer: MEDICARE

## 2024-09-04 VITALS
SYSTOLIC BLOOD PRESSURE: 130 MMHG | HEIGHT: 64 IN | RESPIRATION RATE: 15 BRPM | BODY MASS INDEX: 25.97 KG/M2 | OXYGEN SATURATION: 98 % | HEART RATE: 75 BPM | TEMPERATURE: 97.7 F | DIASTOLIC BLOOD PRESSURE: 79 MMHG | WEIGHT: 152.12 LBS

## 2024-09-04 DIAGNOSIS — C43.62 MALIGNANT MELANOMA OF LEFT SHOULDER (MULTI): Primary | ICD-10-CM

## 2024-09-04 DIAGNOSIS — C43.62 MALIGNANT MELANOMA OF LEFT SHOULDER (MULTI): ICD-10-CM

## 2024-09-04 PROCEDURE — 88189 FLOWCYTOMETRY/READ 16 & >: CPT | Performed by: PATHOLOGY

## 2024-09-04 PROCEDURE — 3700000001 HC GENERAL ANESTHESIA TIME - INITIAL BASE CHARGE: Performed by: SURGERY

## 2024-09-04 PROCEDURE — 7100000010 HC PHASE TWO TIME - EACH INCREMENTAL 1 MINUTE: Performed by: SURGERY

## 2024-09-04 PROCEDURE — 2720000007 HC OR 272 NO HCPCS: Performed by: SURGERY

## 2024-09-04 PROCEDURE — 3600000008 HC OR TIME - EACH INCREMENTAL 1 MINUTE - PROCEDURE LEVEL THREE: Performed by: SURGERY

## 2024-09-04 PROCEDURE — 2500000005 HC RX 250 GENERAL PHARMACY W/O HCPCS: Performed by: SURGERY

## 2024-09-04 PROCEDURE — 11606 EXC TR-EXT MAL+MARG >4 CM: CPT | Performed by: SURGERY

## 2024-09-04 PROCEDURE — 2500000001 HC RX 250 WO HCPCS SELF ADMINISTERED DRUGS (ALT 637 FOR MEDICARE OP): Performed by: NURSE ANESTHETIST, CERTIFIED REGISTERED

## 2024-09-04 PROCEDURE — 7100000001 HC RECOVERY ROOM TIME - INITIAL BASE CHARGE: Performed by: SURGERY

## 2024-09-04 PROCEDURE — 2500000004 HC RX 250 GENERAL PHARMACY W/ HCPCS (ALT 636 FOR OP/ED): Performed by: SURGERY

## 2024-09-04 PROCEDURE — 2500000005 HC RX 250 GENERAL PHARMACY W/O HCPCS: Performed by: NURSE ANESTHETIST, CERTIFIED REGISTERED

## 2024-09-04 PROCEDURE — 3700000002 HC GENERAL ANESTHESIA TIME - EACH INCREMENTAL 1 MINUTE: Performed by: SURGERY

## 2024-09-04 PROCEDURE — 2500000004 HC RX 250 GENERAL PHARMACY W/ HCPCS (ALT 636 FOR OP/ED): Performed by: NURSE ANESTHETIST, CERTIFIED REGISTERED

## 2024-09-04 PROCEDURE — 78830 RP LOCLZJ TUM SPECT W/CT 1: CPT

## 2024-09-04 PROCEDURE — RXMED WILLOW AMBULATORY MEDICATION CHARGE

## 2024-09-04 PROCEDURE — A9520 TC99 TILMANOCEPT DIAG 0.5MCI: HCPCS | Performed by: SURGERY

## 2024-09-04 PROCEDURE — 3430000001 HC RX 343 DIAGNOSTIC RADIOPHARMACEUTICALS: Performed by: SURGERY

## 2024-09-04 PROCEDURE — 7100000002 HC RECOVERY ROOM TIME - EACH INCREMENTAL 1 MINUTE: Performed by: SURGERY

## 2024-09-04 PROCEDURE — 3600000003 HC OR TIME - INITIAL BASE CHARGE - PROCEDURE LEVEL THREE: Performed by: SURGERY

## 2024-09-04 PROCEDURE — 38792 RA TRACER ID OF SENTINL NODE: CPT | Performed by: SURGERY

## 2024-09-04 PROCEDURE — 13121 CMPLX RPR S/A/L 2.6-7.5 CM: CPT | Performed by: SURGERY

## 2024-09-04 PROCEDURE — 14021 TIS TRNFR S/A/L 10.1-30 SQCM: CPT | Performed by: SURGERY

## 2024-09-04 PROCEDURE — 38500 BIOPSY/REMOVAL LYMPH NODES: CPT | Performed by: SURGERY

## 2024-09-04 PROCEDURE — 13122 CMPLX RPR S/A/L ADDL 5 CM/>: CPT | Performed by: SURGERY

## 2024-09-04 PROCEDURE — 88185 FLOWCYTOMETRY/TC ADD-ON: CPT | Mod: TC | Performed by: SURGERY

## 2024-09-04 PROCEDURE — 7100000009 HC PHASE TWO TIME - INITIAL BASE CHARGE: Performed by: SURGERY

## 2024-09-04 RX ORDER — TRAMADOL HYDROCHLORIDE 50 MG/1
50 TABLET ORAL EVERY 6 HOURS PRN
Qty: 6 TABLET | Refills: 0 | Status: SHIPPED | OUTPATIENT
Start: 2024-09-04

## 2024-09-04 RX ORDER — FENTANYL CITRATE 50 UG/ML
INJECTION, SOLUTION INTRAMUSCULAR; INTRAVENOUS AS NEEDED
Status: DISCONTINUED | OUTPATIENT
Start: 2024-09-04 | End: 2024-09-04

## 2024-09-04 RX ORDER — ONDANSETRON HYDROCHLORIDE 2 MG/ML
4 INJECTION, SOLUTION INTRAVENOUS ONCE AS NEEDED
Status: DISCONTINUED | OUTPATIENT
Start: 2024-09-04 | End: 2024-09-04 | Stop reason: HOSPADM

## 2024-09-04 RX ORDER — DROPERIDOL 2.5 MG/ML
0.62 INJECTION, SOLUTION INTRAMUSCULAR; INTRAVENOUS ONCE AS NEEDED
Status: DISCONTINUED | OUTPATIENT
Start: 2024-09-04 | End: 2024-09-04 | Stop reason: HOSPADM

## 2024-09-04 RX ORDER — LIDOCAINE HYDROCHLORIDE 40 MG/ML
SOLUTION TOPICAL AS NEEDED
Status: DISCONTINUED | OUTPATIENT
Start: 2024-09-04 | End: 2024-09-04

## 2024-09-04 RX ORDER — PROPOFOL 10 MG/ML
INJECTION, EMULSION INTRAVENOUS AS NEEDED
Status: DISCONTINUED | OUTPATIENT
Start: 2024-09-04 | End: 2024-09-04

## 2024-09-04 RX ORDER — DIPHENHYDRAMINE HYDROCHLORIDE 50 MG/ML
12.5 INJECTION INTRAMUSCULAR; INTRAVENOUS ONCE AS NEEDED
Status: DISCONTINUED | OUTPATIENT
Start: 2024-09-04 | End: 2024-09-04 | Stop reason: HOSPADM

## 2024-09-04 RX ORDER — ALBUTEROL SULFATE 0.83 MG/ML
2.5 SOLUTION RESPIRATORY (INHALATION) ONCE AS NEEDED
Status: DISCONTINUED | OUTPATIENT
Start: 2024-09-04 | End: 2024-09-04 | Stop reason: HOSPADM

## 2024-09-04 RX ORDER — SODIUM CHLORIDE, SODIUM LACTATE, POTASSIUM CHLORIDE, CALCIUM CHLORIDE 600; 310; 30; 20 MG/100ML; MG/100ML; MG/100ML; MG/100ML
20 INJECTION, SOLUTION INTRAVENOUS CONTINUOUS
Status: DISCONTINUED | OUTPATIENT
Start: 2024-09-04 | End: 2024-09-04 | Stop reason: HOSPADM

## 2024-09-04 RX ORDER — CEFAZOLIN 1 G/1
INJECTION, POWDER, FOR SOLUTION INTRAVENOUS AS NEEDED
Status: DISCONTINUED | OUTPATIENT
Start: 2024-09-04 | End: 2024-09-04

## 2024-09-04 RX ORDER — OXYCODONE HYDROCHLORIDE 5 MG/1
5 TABLET ORAL EVERY 4 HOURS PRN
Status: DISCONTINUED | OUTPATIENT
Start: 2024-09-04 | End: 2024-09-04 | Stop reason: HOSPADM

## 2024-09-04 RX ORDER — MEPERIDINE HYDROCHLORIDE 25 MG/ML
12.5 INJECTION INTRAMUSCULAR; INTRAVENOUS; SUBCUTANEOUS EVERY 10 MIN PRN
Status: DISCONTINUED | OUTPATIENT
Start: 2024-09-04 | End: 2024-09-04 | Stop reason: HOSPADM

## 2024-09-04 RX ORDER — LIDOCAINE HCL/PF 100 MG/5ML
SYRINGE (ML) INTRAVENOUS AS NEEDED
Status: DISCONTINUED | OUTPATIENT
Start: 2024-09-04 | End: 2024-09-04

## 2024-09-04 RX ORDER — ONDANSETRON HYDROCHLORIDE 2 MG/ML
INJECTION, SOLUTION INTRAVENOUS AS NEEDED
Status: DISCONTINUED | OUTPATIENT
Start: 2024-09-04 | End: 2024-09-04

## 2024-09-04 RX ORDER — IPRATROPIUM BROMIDE AND ALBUTEROL SULFATE 2.5; .5 MG/3ML; MG/3ML
3 SOLUTION RESPIRATORY (INHALATION) ONCE
Status: CANCELLED | OUTPATIENT
Start: 2024-09-04 | End: 2024-09-04

## 2024-09-04 RX ORDER — SUCCINYLCHOLINE CHLORIDE 20 MG/ML
INJECTION INTRAMUSCULAR; INTRAVENOUS AS NEEDED
Status: DISCONTINUED | OUTPATIENT
Start: 2024-09-04 | End: 2024-09-04

## 2024-09-04 RX ORDER — SODIUM CHLORIDE, SODIUM LACTATE, POTASSIUM CHLORIDE, CALCIUM CHLORIDE 600; 310; 30; 20 MG/100ML; MG/100ML; MG/100ML; MG/100ML
100 INJECTION, SOLUTION INTRAVENOUS CONTINUOUS
Status: DISCONTINUED | OUTPATIENT
Start: 2024-09-04 | End: 2024-09-04 | Stop reason: HOSPADM

## 2024-09-04 SDOH — HEALTH STABILITY: MENTAL HEALTH: CURRENT SMOKER: 0

## 2024-09-04 ASSESSMENT — PAIN - FUNCTIONAL ASSESSMENT
PAIN_FUNCTIONAL_ASSESSMENT: 0-10

## 2024-09-04 ASSESSMENT — PAIN SCALES - GENERAL
PAINLEVEL_OUTOF10: 0 - NO PAIN
PAINLEVEL_OUTOF10: 0 - NO PAIN
PAIN_LEVEL: 2
PAINLEVEL_OUTOF10: 0 - NO PAIN

## 2024-09-04 NOTE — ANESTHESIA PREPROCEDURE EVALUATION
Patient: Diana Azul    Procedure Information       Date/Time: 09/04/24 0915    Procedures:       *7 INJECTION* WIDE EXCISION LEFT SHOULDER MELANOMA (Left)      BIOPSY SENTINEL LYMPH NODE AXILLARY (Left)    Location: [facility] / [facility]    Surgeons: Kulwinder Juarez MD MPH            Relevant Problems   Cardiac   (+) Hyperlipidemia      Neuro   (+) Depression, major, in partial remission (CMS-HCC)   (+) Primary dysthymia   (+) Situational mixed anxiety and depressive disorder      GI   (+) GERD (gastroesophageal reflux disease)   (+) Hiatal hernia      Endocrine   (+) Adult hypothyroidism   (+) Class 1 obesity with body mass index (BMI) of 31.0 to 31.9 in adult      Skin   (+) Dermatitis, eczematoid       Clinical information reviewed:   Tobacco  Allergies  Meds   Med Hx  Surg Hx  OB Status  Fam Hx  Soc   Hx        NPO Detail:  NPO/Void Status  Carbohydrate Drink Given Prior to Surgery? : N  Date of Last Liquid: 09/03/24  Time of Last Liquid: 2300  Date of Last Solid: 09/03/24  Time of Last Solid: 2300  Last Intake Type: Clear fluids  Time of Last Void: 0600         Physical Exam    Airway  Mallampati: II  TM distance: >3 FB     Cardiovascular - normal exam     Dental    Pulmonary - normal exam     Abdominal - normal exam         Anesthesia Plan    History of general anesthesia?: yes  History of complications of general anesthesia?: no    ASA 2     general     The patient is not a current smoker.    Anesthetic plan and risks discussed with patient.    Plan discussed with CRNA and attending.

## 2024-09-04 NOTE — ANESTHESIA POSTPROCEDURE EVALUATION
Patient: Diana Azul    Procedure Summary       Date: 09/04/24 Room / Location: [facility] / [facility]    Anesthesia Start: 0917 Anesthesia Stop: 1128    Procedures:       *7 INJECTION* WIDE EXCISION LEFT SHOULDER MELANOMA (Left)      BIOPSY SENTINEL LYMPH NODE AXILLARY (Left) Diagnosis:       Malignant melanoma of left shoulder (Multi)      (Malignant melanoma of left shoulder (Multi) [C43.62])    Surgeons: Kulwinder Juarez MD MPH Responsible Provider: Lior Merchant MD    Anesthesia Type: general ASA Status: 2            Anesthesia Type: general    Vitals Value Taken Time   /69 09/04/24 1200   Temp 36.5 °C (97.7 °F) 09/04/24 1124   Pulse 67 09/04/24 1200   Resp 15 09/04/24 1200   SpO2  09/04/24 1233       Anesthesia Post Evaluation    Patient location during evaluation: PACU  Patient participation: complete - patient participated  Level of consciousness: awake  Pain score: 2  Pain management: adequate  Multimodal analgesia pain management approach  Airway patency: patent  Two or more strategies used to mitigate risk of obstructive sleep apnea  Cardiovascular status: acceptable  Respiratory status: acceptable  Hydration status: acceptable  Postoperative Nausea and Vomiting: none      No notable events documented.

## 2024-09-04 NOTE — OP NOTE
*7 INJECTION* WIDE EXCISION LEFT SHOULDER MELANOMA (L), BIOPSY SENTINEL LYMPH NODE AXILLARY (L) Operative Note     Date: 2024  OR Location: [facility]    Name: Diana Azul, : 1940, Age: 84 y.o., MRN: [id number], Sex: female    Diagnosis  Pre-op Diagnosis      * Malignant melanoma of left shoulder (Multi) [C43.62] Post-op Diagnosis     * Malignant melanoma of left shoulder (Multi) [C43.62]     Procedures  *7 INJECTION* WIDE EXCISION LEFT SHOULDER MELANOMA  26561 - NE EXCISION MALIGNANT LESION TRUNK/ARM/LEG > 4.0 CM    *7 INJECTION* WIDE EXCISION LEFT SHOULDER MELANOMA  49042 - NE REPAIR COMPLEX SCALP/ARM/LEG 2.6-7.5 CM    *7 INJECTION* WIDE EXCISION LEFT SHOULDER MELANOMA  92658 - NE REPAIR COMPLEX SCALP/ARM/LEG EA ADDL 5 CM/<    BIOPSY SENTINEL LYMPH NODE AXILLARY  58150 - NE BX/EXC LYMPH NODE OPEN SUPERFICIAL    *7 INJECTION* WIDE EXCISION LEFT SHOULDER MELANOMA  31217 - NE ADJT/REARRGMT SCALP/ARM/LEG 10.1-30.0 SQ CM    NE INJ RADIOACTIVE TRACER FOR ID OF SENTINEL NODE [[id number]]  Surgeons      * Kulwinder Juarez - Primary    Resident/Fellow/Other Assistant:  Surgeons and Role:  * No surgeons found with a matching role *    Procedure Summary  Anesthesia: General  ASA: II  Anesthesia Staff: Anesthesiologist: Lior Merchant MD  CRNA: GAIL Pinedo-CRNA  Estimated Blood Loss: 10mL  Intra-op Medications:   Administrations occurring from 0915 to 1130 on 24:   Medication Name Total Dose   BUPivacaine HCl (Marcaine) 0.5 % (5 mg/mL) 20 mL, lidocaine (Xylocaine) 20 mL syringe 20 mL              Anesthesia Record               Intraprocedure I/O Totals       None           Specimen:   ID Type Source Tests Collected by Time   1 : LEFT AXILLARY SENTINEL LYMPH NODE #1 COUNT 215 Tissue SENTINEL LYMPH NODE OTHER DERMPATH LAB- DERMATOPATHOLOGY Kulwinder Juarez MD MPH 2024 1005   2 : LEFT AXILLARY LYMPH NODE Tissue AXILLARY LYMPH NODE DISSECTION LEFT - MELANOMA FLOW CYTOMETRY TEST Kulwinder Juarez  MD MPH 9/4/2024 1014   3 : LEFT AXILLARY LYMPH NODE Tissue AXILLARY LYMPH NODE DISSECTION LEFT - MELANOMA SURGICAL PATHOLOGY EXAM Kulwinder Juarez MD MPH 9/4/2024 1020   4 : LEFT AXILLARY SENTINEL LYMPH NODE #2 COUNT 476 Tissue SENTINEL LYMPH NODE OTHER DERMPATH LAB- DERMATOPATHOLOGY Kulwinder Juarez MD MPH 9/4/2024 1024   5 : LEFT SHOULDER MELANOMA SHORT PROXIMAL 12 O'CLOCK, LONG ANTERIOR 9 O'CLOCK Tissue SKIN WIDE EXCISION DERMPATH LAB- DERMATOPATHOLOGY Kulwinder Juarez MD MPH 9/4/2024 1040        Staff:   Circulator: Patrica MEDINAA: Lior  Circulator: Shanell  Scrub Person: Kimber  Scrub Person: Sony      Findings: Left shoulder melanoma biopsy site noted.  Lymphoscintigraphy demonstrating drainage to the left axilla only    Indications: Diana Azul is an 84 y.o. female who is having surgery for Malignant melanoma of left shoulder (Multi) [C43.62].  The patient presented with a left shoulder melanoma that was at least 3.6 mm in Breslow depth, spindle cell type, and nonulcerated.  She had no clinically palpable lymphadenopathy.  She was recommended for a wide excision with 2 cm margins and sentinel lymph node biopsy.  Although clinical trial consideration was made, she was not eligible for participation    The patient was seen in the preoperative area. The risks, benefits, complications, treatment options, non-operative alternatives, expected recovery and outcomes were discussed with the patient. The possibilities of reaction to medication, pulmonary aspiration, injury to surrounding structures, bleeding, recurrent infection, the need for additional procedures, failure to diagnose a condition, and creating a complication requiring transfusion or operation were discussed with the patient. The patient concurred with the proposed plan, giving informed consent.  The site of surgery was properly noted/marked if necessary per policy. The patient has been actively warmed in preoperative area. Preoperative  antibiotics have been ordered and given within 1 hours of incision. Venous thrombosis prophylaxis have been ordered including bilateral sequential compression devices    Procedure Details: The patient was brought to the OR and placed on the OR table in supine position. General anesthesia was induced and an ET tube was placed without difficulty. The patient was positioned in right lateral decubitus position with the left arm prepped out and the surgical sites were prepped and draped in sterile fashion.    Attention was turned to the left axilla.  An incision was made sharply at the base of the hair-bearing area and dissection carried into the axillary basin using cautery.  2 sentinel nodes were identified using the gamma probe and excised. Once all sentinel nodes were excised, the basin was irrigated and hemostasis was assured. The wound was then closed in layers with 3-0 vicryl for the subcutaneous and deep dermal layers, and 4-0 monocryl for the skin. Skin glue was used as a dressing.     Notably, a 0.8x0.8cm section of the sentinel lymph node #1 was excised sharply and sent fresh for lymphoma processing due to the patient's persistent leukocytosis, and the large node concerning for pathology. The excised portion was ~4cm from the area of hottest gamma signal at this site.     At this point, the primary melanoma on the apex of the left shoulder was addressed. A 2cm margin was marked around the biopsy site. This marking was extended in elliptical fashion resulting in a 5.5 x 15.0 cm marking. The skin was incised sharply and dissection carried to but not through the underlying muscular fascia. The specimen was marked with a short stitch proximally at 12:00, and a long stitch anteriorly at 9:00. Skin and subcutaneous flaps were raised for 2cm in all directions. This resulted in a 9.5 by 18.0cm wound in order to mobilize the skin flaps. The wound was irrigated and hemostasis was obtained. The wound was closed in layers  with 2-0 and 3-0 Vicryl for the scarpas and deep dermal layers, and 4-0 monocryl for the skin. Skin glue was used as a dressing.     Complications:  None; patient tolerated the procedure well.    Disposition: PACU - hemodynamically stable.  Condition: stable     Wide Local Excision for Primary Cutaneous Melanoma  Operation performed with curative intent Yes   Original Breslow thickness of the lesion 3.6 mm (to the tenth of a millimeter)   Clinical margin width 2 cm   Depth of excision Full-thickness skin/subcutaneous tissue down to fascia (melanoma)     Attending Attestation: I was present for the entire procedure.    Kulwinder Juarez  Phone Number: [phone]

## 2024-09-04 NOTE — ANESTHESIA PROCEDURE NOTES
Airway  Date/Time: 9/4/2024 9:31 AM  Urgency: elective    Airway not difficult    Staffing  Performed: CRNA   Authorized by: Lior Merchant MD    Performed by: GAIL Pinedo-ELADIO  Patient location during procedure: OR    Indications and Patient Condition  Indications for airway management: anesthesia  Spontaneous Ventilation: absent  Sedation level: deep  Preoxygenated: yes  Mask difficulty assessment: 0 - not attempted    Final Airway Details  Final airway type: endotracheal airway      Successful airway: ETT  Cuffed: yes   Successful intubation technique: video laryngoscopy  Facilitating devices/methods: intubating stylet  Blade: Lamin  Blade size: #4  ETT size (mm): 7.0  Cormack-Lehane Classification: grade IIa - partial view of glottis  Placement verified by: chest auscultation and capnometry   Measured from: lips  ETT to lips (cm): 22  Number of attempts at approach: 1

## 2024-09-07 LAB
CELL COUNT (BLOOD): 19.9 X10*3/UL
CELL POPULATIONS: NORMAL
DIAGNOSIS: NORMAL
FLOW DIFFERENTIAL: NORMAL
FLOW TEST ORDERED: NORMAL
LAB TEST METHOD: NORMAL
NUMBER OF CELLS COLLECTED: NORMAL PER TUBE
PATH REPORT.TOTAL CANCER: NORMAL
SIGNATURE COMMENT: NORMAL
SPECIMEN VIABILITY: NORMAL

## 2024-09-12 ENCOUNTER — APPOINTMENT (OUTPATIENT)
Dept: PRIMARY CARE | Facility: CLINIC | Age: 84
End: 2024-09-12
Payer: MEDICARE

## 2024-09-12 DIAGNOSIS — F33.41 RECURRENT MAJOR DEPRESSIVE DISORDER, IN PARTIAL REMISSION (CMS-HCC): Primary | ICD-10-CM

## 2024-09-12 ASSESSMENT — PATIENT HEALTH QUESTIONNAIRE - PHQ9
7. TROUBLE CONCENTRATING ON THINGS, SUCH AS READING THE NEWSPAPER OR WATCHING TELEVISION: SEVERAL DAYS
2. FEELING DOWN, DEPRESSED OR HOPELESS: SEVERAL DAYS
10. IF YOU CHECKED OFF ANY PROBLEMS, HOW DIFFICULT HAVE THESE PROBLEMS MADE IT FOR YOU TO DO YOUR WORK, TAKE CARE OF THINGS AT HOME, OR GET ALONG WITH OTHER PEOPLE: NOT DIFFICULT AT ALL
SUM OF ALL RESPONSES TO PHQ9 QUESTIONS 1 & 2: 1
SUM OF ALL RESPONSES TO PHQ QUESTIONS 1-9: 3
1. LITTLE INTEREST OR PLEASURE IN DOING THINGS: NOT AT ALL
3. TROUBLE FALLING OR STAYING ASLEEP: NOT AT ALL
8. MOVING OR SPEAKING SO SLOWLY THAT OTHER PEOPLE COULD HAVE NOTICED. OR THE OPPOSITE, BEING SO FIGETY OR RESTLESS THAT YOU HAVE BEEN MOVING AROUND A LOT MORE THAN USUAL: NOT AT ALL
4. FEELING TIRED OR HAVING LITTLE ENERGY: SEVERAL DAYS
5. POOR APPETITE OR OVEREATING: NOT AT ALL
6. FEELING BAD ABOUT YOURSELF - OR THAT YOU ARE A FAILURE OR HAVE LET YOURSELF OR YOUR FAMILY DOWN: NOT AT ALL
9. THOUGHTS THAT YOU WOULD BE BETTER OFF DEAD, OR OF HURTING YOURSELF: NOT AT ALL

## 2024-09-12 ASSESSMENT — ANXIETY QUESTIONNAIRES
GAD7 TOTAL SCORE: 6
2. NOT BEING ABLE TO STOP OR CONTROL WORRYING: SEVERAL DAYS
6. BECOMING EASILY ANNOYED OR IRRITABLE: NOT AT ALL
IF YOU CHECKED OFF ANY PROBLEMS ON THIS QUESTIONNAIRE, HOW DIFFICULT HAVE THESE PROBLEMS MADE IT FOR YOU TO DO YOUR WORK, TAKE CARE OF THINGS AT HOME, OR GET ALONG WITH OTHER PEOPLE: SOMEWHAT DIFFICULT
5. BEING SO RESTLESS THAT IT IS HARD TO SIT STILL: NOT AT ALL
4. TROUBLE RELAXING: SEVERAL DAYS
7. FEELING AFRAID AS IF SOMETHING AWFUL MIGHT HAPPEN: SEVERAL DAYS
3. WORRYING TOO MUCH ABOUT DIFFERENT THINGS: SEVERAL DAYS
1. FEELING NERVOUS, ANXIOUS, OR ON EDGE: MORE THAN HALF THE DAYS

## 2024-09-12 NOTE — PROGRESS NOTES
Collaborative Care ([facility])  Progress Note    Type of Interaction: In Office    Start Time: 11:00 AM    End Time: 11:35 AM    Appointment: Scheduled    Reason for Visit:   Chief Complaint   Patient presents with    Follow-up       Interval History / Patient Symptoms:   Diana Azul is a 84 y.o. female currently enrolled in the [facility] program for symptom monitoring, brief therapy, and support. Patient presents today for follow up for Collaborative Care services.     Symptom Monitoring:   Anxiety has decreased since surgery for melanoma    Metrics:  Patient Health Questionnaire-9 Score: 3 (9/12/2024 11:07 AM)  ROJELIO-7 Total Score: 6 (9/12/2024 11:06 AM)    Interventions Provided:   Review progress/symptom monitoring    Progress Made:   Moderate    Response to Intervention:  We discussed the following elements during appointment:   Patient reports doing well; had surgery to remove melanoma, which went well.  Patient is relieved it is over and her mindset has changed that she can't worry about what the results might be- it won't change anything.    Excited about going to a wedding next week in [place]; has follow up with surgeon prior to leaving for the wedding.    Currently patient endorses being able to manage stressors well and had few concerns.     Patient was engaged, responsive, and interactive.     Plan:   Next appointment will be last for collaborative care, at which time patient will graduate from the program. Patient is aware that [facility] will be available for consultation even when closed from [facility].    Follow Up / Next Appointment: Next appointment: 10/10/24

## 2024-09-16 LAB
LAB AP ASR DISCLAIMER: NORMAL
LABORATORY COMMENT REPORT: NORMAL
PATH REPORT.FINAL DX SPEC: NORMAL
PATH REPORT.GROSS SPEC: NORMAL
PATH REPORT.MICROSCOPIC SPEC OTHER STN: NORMAL
PATH REPORT.RELEVANT HX SPEC: NORMAL
PATH REPORT.TOTAL CANCER: NORMAL

## 2024-09-17 ENCOUNTER — OFFICE VISIT (OUTPATIENT)
Dept: SURGICAL ONCOLOGY | Facility: CLINIC | Age: 84
End: 2024-09-17
Payer: MEDICARE

## 2024-09-17 VITALS
RESPIRATION RATE: 18 BRPM | BODY MASS INDEX: 26.73 KG/M2 | HEART RATE: 155 BPM | OXYGEN SATURATION: 98 % | TEMPERATURE: 97.2 F | WEIGHT: 155.7 LBS | SYSTOLIC BLOOD PRESSURE: 130 MMHG | DIASTOLIC BLOOD PRESSURE: 72 MMHG

## 2024-09-17 DIAGNOSIS — C91.10 CLL (CHRONIC LYMPHOCYTIC LEUKEMIA) (MULTI): ICD-10-CM

## 2024-09-17 DIAGNOSIS — C43.62 MALIGNANT MELANOMA OF LEFT SHOULDER (MULTI): Primary | ICD-10-CM

## 2024-09-17 PROCEDURE — 1123F ACP DISCUSS/DSCN MKR DOCD: CPT | Performed by: SURGERY

## 2024-09-17 PROCEDURE — 1126F AMNT PAIN NOTED NONE PRSNT: CPT | Performed by: SURGERY

## 2024-09-17 PROCEDURE — 1157F ADVNC CARE PLAN IN RCRD: CPT | Performed by: SURGERY

## 2024-09-17 PROCEDURE — 99211 OFF/OP EST MAY X REQ PHY/QHP: CPT | Performed by: SURGERY

## 2024-09-17 PROCEDURE — 1036F TOBACCO NON-USER: CPT | Performed by: SURGERY

## 2024-09-17 PROCEDURE — 1159F MED LIST DOCD IN RCRD: CPT | Performed by: SURGERY

## 2024-09-17 ASSESSMENT — PAIN SCALES - GENERAL: PAINLEVEL: 0-NO PAIN

## 2024-09-17 NOTE — PROGRESS NOTES
Postoperative Visit    Referring Provider:  MD Marilee Olivera DO    Chief Complaint:  Chief Complaint   Patient presents with    Follow-up       History of Present Illness:  This is a 84 y.o. 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 Noted a change of that lesion over the month prior to biopsy. No bleeding or trauma. TBSE was not performed at that time, but the patient has no other concerning lesions at this time.     Surgery 9/4/2024 - Wide excision left shoulder melanoma with 2cm margins, left axillary SLNB (a portion of the node was sent for flow cytometry due to clinical characteristics of the node)  Pathology - No residual melanoma; 0/2 LN involved with melanoma; CLL diagnosed     9/17/2024 - POV. Overall doing well. Some soreness in the left axilla, but minor. No wound issues. The patient understands the CLL diagnosis and that she will be referred to Heme/Onc for ongoing evaluation      Review of Systems:  A complete 12 point review of systems was performed and is negative except as noted in the history of present illness.    Vital Signs:  Vitals:    09/17/24 1144   BP: 130/72   Pulse: (!) 155   Resp: 18   Temp: 36.2 °C (97.2 °F)   SpO2: 98%        Physical Exam:  GEN: No acute distress, Healthy appearing  HEENT: Moist mucus membranes, normocephalic  CARDS: RRR  PULM: No respiratory distress  LYMPH: Small seroma in the left axilla  SKIN: Left shoulder incisioin c/d/I.  NEURO: No gross sensorimotor deficits  EXT: No arm or leg swelling    Laboratory Values:  Lab Results   Component Value Date    WBC 17.2 (H) 08/16/2024    WBC 17.2 (H) 08/16/2024    HGB 12.1 08/16/2024    HGB 12.1 08/16/2024    HCT 36.8 08/16/2024    HCT 36.8 08/16/2024    MCV 92 08/16/2024    MCV 92 08/16/2024     08/16/2024     08/16/2024        Chemistry    Lab Results   Component Value Date/Time     08/16/2024 1054    K  "4.0 08/16/2024 1054     08/16/2024 1054    CO2 23 08/16/2024 1054    BUN 23 08/16/2024 1054    CREATININE 0.69 08/16/2024 1054    Lab Results   Component Value Date/Time    CALCIUM 9.4 08/16/2024 1054    ALKPHOS 59 03/25/2024 1013    AST 21 03/25/2024 1013    ALT 28 03/25/2024 1013    BILITOT 0.5 03/25/2024 1013           No results found for: \"PR1\"      Imaging:  I have personally reviewed the images and the radiologist's report.  No images are attached to the encounter or orders placed in the encounter.     Assessment:  This is a 84 y.o. female who presents with a left shoudler melanoma that is at least 3.7mm in Breslow depth and non-ulcerated. No clinically palpable lymphadenopathy. Recommended for wide excision and sentinel node biopsy. Understands the risks and benefits.     Stage IIA melanoma  New diagnosis of CLL    Plan:  -- Primary melanoma surveillance with Dermatology (Dr. Durán) for total body skin and david exams.   -- Referral to CLL specialist  -- The patient asked very appropriate questions that were answered to the best of my ability with the current information at hand. She knows to call with any questions or concerns that arise    Kulwinder Juarez MD, MPH  "

## 2024-09-20 ENCOUNTER — APPOINTMENT (OUTPATIENT)
Dept: SURGICAL ONCOLOGY | Facility: HOSPITAL | Age: 84
End: 2024-09-20
Payer: MEDICARE

## 2024-09-24 ENCOUNTER — DOCUMENTATION (OUTPATIENT)
Dept: HEMATOLOGY/ONCOLOGY | Facility: HOSPITAL | Age: 84
End: 2024-09-24
Payer: MEDICARE

## 2024-09-24 DIAGNOSIS — C91.10 CLL (CHRONIC LYMPHOCYTIC LEUKEMIA) (MULTI): ICD-10-CM

## 2024-09-24 NOTE — PROGRESS NOTES
9/24/24 1300  Patient initially presented to [facility] for leukocytosis and found to have CLL in 4/2024. Since then, patent has an itchy rash and is referred to [facility] ling from [facility]. Spoke with patient who recently attended her grandson's wedding in [place]. She said other than the itching she really isnt having any complaints. We have scheduled her for 10/24/24 with Dr. Miguel since patient prefers to go to [facility]. I have updated hematology with appointment details and patient has my contact information. Patient is also seeing Kathryn on 9/30/24. MJ Richter

## 2024-09-25 ENCOUNTER — LAB (OUTPATIENT)
Dept: LAB | Facility: LAB | Age: 84
End: 2024-09-25
Payer: MEDICARE

## 2024-09-25 DIAGNOSIS — D72.829 LEUKOCYTOSIS, UNSPECIFIED TYPE: ICD-10-CM

## 2024-09-25 LAB
ALBUMIN SERPL BCP-MCNC: 4.3 G/DL (ref 3.4–5)
ALP SERPL-CCNC: 63 U/L (ref 33–136)
ALT SERPL W P-5'-P-CCNC: 26 U/L (ref 7–45)
ANION GAP SERPL CALC-SCNC: 14 MMOL/L (ref 10–20)
AST SERPL W P-5'-P-CCNC: 21 U/L (ref 9–39)
BASOPHILS # BLD AUTO: 0.05 X10*3/UL (ref 0–0.1)
BASOPHILS NFR BLD AUTO: 0.3 %
BILIRUB SERPL-MCNC: 0.3 MG/DL (ref 0–1.2)
BUN SERPL-MCNC: 14 MG/DL (ref 6–23)
CALCIUM SERPL-MCNC: 9.3 MG/DL (ref 8.6–10.6)
CHLORIDE SERPL-SCNC: 109 MMOL/L (ref 98–107)
CO2 SERPL-SCNC: 25 MMOL/L (ref 21–32)
CREAT SERPL-MCNC: 0.8 MG/DL (ref 0.5–1.05)
EGFRCR SERPLBLD CKD-EPI 2021: 72 ML/MIN/1.73M*2
EOSINOPHIL # BLD AUTO: 0.2 X10*3/UL (ref 0–0.4)
EOSINOPHIL NFR BLD AUTO: 1.1 %
ERYTHROCYTE [DISTWIDTH] IN BLOOD BY AUTOMATED COUNT: 14.4 % (ref 11.5–14.5)
GLUCOSE SERPL-MCNC: 101 MG/DL (ref 74–99)
HCT VFR BLD AUTO: 37.4 % (ref 36–46)
HGB BLD-MCNC: 11.3 G/DL (ref 12–16)
IMM GRANULOCYTES # BLD AUTO: 0.07 X10*3/UL (ref 0–0.5)
IMM GRANULOCYTES NFR BLD AUTO: 0.4 % (ref 0–0.9)
LYMPHOCYTES # BLD AUTO: 11.87 X10*3/UL (ref 0.8–3)
LYMPHOCYTES NFR BLD AUTO: 65.1 %
MCH RBC QN AUTO: 29.4 PG (ref 26–34)
MCHC RBC AUTO-ENTMCNC: 30.2 G/DL (ref 32–36)
MCV RBC AUTO: 97 FL (ref 80–100)
MONOCYTES # BLD AUTO: 0.73 X10*3/UL (ref 0.05–0.8)
MONOCYTES NFR BLD AUTO: 4 %
NEUTROPHILS # BLD AUTO: 5.31 X10*3/UL (ref 1.6–5.5)
NEUTROPHILS NFR BLD AUTO: 29.1 %
NRBC BLD-RTO: 0 /100 WBCS (ref 0–0)
PLATELET # BLD AUTO: 208 X10*3/UL (ref 150–450)
POTASSIUM SERPL-SCNC: 4 MMOL/L (ref 3.5–5.3)
PROT SERPL-MCNC: 6.1 G/DL (ref 6.4–8.2)
RBC # BLD AUTO: 3.84 X10*6/UL (ref 4–5.2)
SODIUM SERPL-SCNC: 144 MMOL/L (ref 136–145)
WBC # BLD AUTO: 18.2 X10*3/UL (ref 4.4–11.3)

## 2024-09-25 PROCEDURE — 88185 FLOWCYTOMETRY/TC ADD-ON: CPT

## 2024-09-25 PROCEDURE — 88184 FLOWCYTOMETRY/ TC 1 MARKER: CPT

## 2024-09-25 PROCEDURE — 80053 COMPREHEN METABOLIC PANEL: CPT

## 2024-09-25 PROCEDURE — 85025 COMPLETE CBC W/AUTO DIFF WBC: CPT

## 2024-09-25 PROCEDURE — 36415 COLL VENOUS BLD VENIPUNCTURE: CPT

## 2024-09-25 PROCEDURE — 88189 FLOWCYTOMETRY/READ 16 & >: CPT | Performed by: SURGERY

## 2024-09-30 ENCOUNTER — OFFICE VISIT (OUTPATIENT)
Dept: HEMATOLOGY/ONCOLOGY | Facility: CLINIC | Age: 84
End: 2024-09-30
Payer: MEDICARE

## 2024-09-30 ENCOUNTER — DOCUMENTATION (OUTPATIENT)
Dept: PRIMARY CARE | Facility: CLINIC | Age: 84
End: 2024-09-30
Payer: MEDICARE

## 2024-09-30 VITALS
DIASTOLIC BLOOD PRESSURE: 76 MMHG | BODY MASS INDEX: 26.94 KG/M2 | OXYGEN SATURATION: 99 % | WEIGHT: 156.97 LBS | SYSTOLIC BLOOD PRESSURE: 145 MMHG | RESPIRATION RATE: 16 BRPM | HEART RATE: 65 BPM | TEMPERATURE: 97.5 F

## 2024-09-30 DIAGNOSIS — D72.829 LEUKOCYTOSIS, UNSPECIFIED TYPE: ICD-10-CM

## 2024-09-30 DIAGNOSIS — F33.41 RECURRENT MAJOR DEPRESSIVE DISORDER, IN PARTIAL REMISSION (CMS-HCC): Primary | ICD-10-CM

## 2024-09-30 PROCEDURE — 99493 SBSQ PSYC COLLAB CARE MGMT: CPT | Performed by: FAMILY MEDICINE

## 2024-09-30 PROCEDURE — 1159F MED LIST DOCD IN RCRD: CPT | Performed by: NURSE PRACTITIONER

## 2024-09-30 PROCEDURE — 99215 OFFICE O/P EST HI 40 MIN: CPT | Performed by: NURSE PRACTITIONER

## 2024-09-30 PROCEDURE — 1123F ACP DISCUSS/DSCN MKR DOCD: CPT | Performed by: NURSE PRACTITIONER

## 2024-09-30 PROCEDURE — 1157F ADVNC CARE PLAN IN RCRD: CPT | Performed by: NURSE PRACTITIONER

## 2024-09-30 PROCEDURE — 1160F RVW MEDS BY RX/DR IN RCRD: CPT | Performed by: NURSE PRACTITIONER

## 2024-09-30 PROCEDURE — 1126F AMNT PAIN NOTED NONE PRSNT: CPT | Performed by: NURSE PRACTITIONER

## 2024-09-30 PROCEDURE — 1036F TOBACCO NON-USER: CPT | Performed by: NURSE PRACTITIONER

## 2024-09-30 ASSESSMENT — PAIN SCALES - GENERAL: PAINLEVEL: 0-NO PAIN

## 2024-09-30 NOTE — PROGRESS NOTES
"Patient ID: Diana Azul is a 84 y.o. female.  Referring Physician: Rosanne M Casal, APRN-CNP, DNP  [address] [place] [address]  Primary Care Provider: Marilee Yao DO  Visit Type: Follow Up      Subjective      \"Celena\" is a 85 yo woman with a PMH of depression, chronic constipation, abdominal bloating, recent trouble with memory  referred to benign hematology for leukocytosis (Neutrophilia & Lymphocytosis) by Dr Marilee Yao. We did peripheral flow in 3/23 and she was found to have:   CD5+ B-cell clonal population with a chronic lymphocytic leukemia/small  lymphocytic lymphoma (CLL/SLL)-like phenotype, see note.    No discrete blast population identified.    No abnormality of granulocytes or monocytes noted.   Note SEE BELOW   Comment: The monoclonal B-cell count is estimated at 3.3 x 10E9/L. While the clonal  B-cell population has a phenotype consistent with CLL/SLL, in the absence of  lymphadenopathy, organomegaly or extramedullary disease, a clonal population  less than 5x10E9/L is consistent with monoclonal B-cell lymphocytosis (MBL).  Of note, there is heterogeneity of the CD5+ B-cell population, with a  subpopulation showing dimmer expression of CD19, CD45, and negative for  sKappa/sLambda, and the other subpopulation with higher level of expression  of CD19 and CD45, being dimly  positive for CD25 and sLambda+. It is unclear  if this represent two separate clonal processes or heterogeneity of a single  clonal disorder (favored, as both are CD5+/CD23+). Clinical and morphologic  correlation is suggested.     We discussed the significance and since she was not symptomatic we watched her labs very closely and she remained stable.     She presents today for follow up accompanied by her daughter. For the past several months the patient states that she has been very itchy all over. She has a macular/papular rash on her back and arms and has seen Derm for this. She is currently using triamcinalone " 1% cream with minimal relief.   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 Noted a change of that lesion over the month prior to biopsy. No bleeding or trauma. TBSE was not performed at that time, but the patient had no other concerning lesions at this time.      Surgery 9/4/2024 - Wide excision left shoulder melanoma with 2cm margins, left axillary SLNB (a portion of the node was sent for flow cytometry due to clinical characteristics of the node)  Pathology - No residual melanoma; 0/2 LN involved with melanoma; CLL diagnosed      9/17/2024 - POV. Overall doing well. Some soreness in the left axilla, but minor. No wound issues. Per Dr Juarez: The patient understands the CLL diagnosis and that she will be referred to Heme/Onc for ongoing evaluation. I contacted the patient and reviewed the diagnosis with her, noting that I would like her to see a malignant heme MD for further workup since her disease appears to be progressing. The patient stated that she wanted to attend her [facility]'s wedding last week and would like to follow up with me today to review new findings.      ROS: 10 point review of systems negative except as stated in HPI    Past Medical History : as above, also  ? 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 Dysfunction of both eustachian tubes (381.81) (H69.83)  ? Elevated BP without diagnosis of hypertension (796.2) (R03.0)  ? Elevated glucose (790.29) (R73.09)  ? Esophageal reflux (530.81) (K21.9)  ? GERD (gastroesophageal reflux disease) (530.81) (K21.9)  ? Hiatal hernia (553.3) (K44.9)  ? Hyperlipidemia (272.4) (E78.5)  ? Leukocytosis (288.60) (D72.829)  ? Low back pain (724.2) (M54.50)  ? Memory deficit (780.93) (R41.3)  ? 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 Situational mixed anxiety and depressive disorder  (309.28) (F43.23)     Surgical History: bilateral knee replacements about 20 years ago     Family History: father  of cancer (she thinks pancreatic) at age 85. Mother  at 82 from MI.  patient has 3 daughters and 2 sons. 1 daughter had breast cancer but in remission  ? Family history of congenital heart disease (V19.5) (Z82.79)  ? Family history of myocardial infarction (V17.3) (Z82.49)  ? Family history of congenital heart disease (V19.5) (Z82.79)     Social History:  lives alone. Daughter is main social support  ? Never used tobacco (V49.89) (Z78.9)  ? Social alcohol use (Z78.9)    IObjective   BSA: 1.79 meters squared  /76   Pulse 65   Temp 36.4 °C (97.5 °F)   Resp 16   Wt 71.2 kg (156 lb 15.5 oz)   SpO2 99%   BMI 26.94 kg/m²      has a past medical history of Anxiety, Compression fracture of T10 vertebra, Depression, Disorder of Eustachian tube, bilateral, GERD (gastroesophageal reflux disease), Hiatal hernia, History of anxiety disorder (10/03/2013), HLD (hyperlipidemia), Hypothyroid, Malignant melanoma of left shoulder, and Memory deficit.   has no past surgical history on file.  Family History   Problem Relation Name Age of Onset    Heart disease Father          congenital    Heart attack Father      Heart disease Brother          congenital     Oncology History    No history exists.       Diana Azul  reports that she has never smoked. She has never used smokeless tobacco.  She  reports current alcohol use of about 1.0 standard drink of alcohol per week.  She  reports no history of drug use.    Physical Exam  HENT:      Head: Normocephalic.      Nose: Nose normal.      Mouth/Throat:      Mouth: Mucous membranes are moist.   Eyes:      Pupils: Pupils are equal, round, and reactive to light.   Cardiovascular:      Rate and Rhythm: Normal rate and regular rhythm.      Pulses: Normal pulses.      Heart sounds: Normal heart sounds.   Pulmonary:      Effort: Pulmonary effort is  normal.      Breath sounds: Normal breath sounds.   Abdominal:      General: Bowel sounds are normal.      Palpations: Abdomen is soft.      Comments: No hepatosplenomegaly palpated. No abdominal tenderness on exam.    Musculoskeletal:         General: Normal range of motion.   Lymphadenopathy:      Cervical: Cervical adenopathy (as well as axillary lymph node palpated in right axilla. Scar in left axilla from biospy is healing.) present.   Skin:     General: Skin is warm and dry.   Neurological:      General: No focal deficit present.      Mental Status: She is alert and oriented to person, place, and time.   Psychiatric:         Mood and Affect: Mood normal.         Behavior: Behavior normal.         WBC   Date/Time Value Ref Range Status   09/25/2024 09:32 AM 18.2 (H) 4.4 - 11.3 x10*3/uL Final   08/16/2024 10:54 AM 17.2 (H) 4.4 - 11.3 x10*3/uL Final   08/16/2024 10:54 AM 17.2 (H) 4.4 - 11.3 x10*3/uL Final     nRBC   Date Value Ref Range Status   09/25/2024 0.0 0.0 - 0.0 /100 WBCs Final   08/16/2024 0.0 0.0 - 0.0 /100 WBCs Final   08/16/2024 0.0 0.0 - 0.0 /100 WBCs Final     RBC   Date Value Ref Range Status   09/25/2024 3.84 (L) 4.00 - 5.20 x10*6/uL Final   08/16/2024 4.02 4.00 - 5.20 x10*6/uL Final   08/16/2024 4.02 4.00 - 5.20 x10*6/uL Final     Hemoglobin   Date Value Ref Range Status   09/25/2024 11.3 (L) 12.0 - 16.0 g/dL Final   08/16/2024 12.1 12.0 - 16.0 g/dL Final   08/16/2024 12.1 12.0 - 16.0 g/dL Final     Hematocrit   Date Value Ref Range Status   09/25/2024 37.4 36.0 - 46.0 % Final   08/16/2024 36.8 36.0 - 46.0 % Final   08/16/2024 36.8 36.0 - 46.0 % Final     MCV   Date/Time Value Ref Range Status   09/25/2024 09:32 AM 97 80 - 100 fL Final   08/16/2024 10:54 AM 92 80 - 100 fL Final   08/16/2024 10:54 AM 92 80 - 100 fL Final     MCH   Date/Time Value Ref Range Status   09/25/2024 09:32 AM 29.4 26.0 - 34.0 pg Final   08/16/2024 10:54 AM 30.1 26.0 - 34.0 pg Final   08/16/2024 10:54 AM 30.1 26.0 - 34.0  pg Final     MCHC   Date/Time Value Ref Range Status   09/25/2024 09:32 AM 30.2 (L) 32.0 - 36.0 g/dL Final   08/16/2024 10:54 AM 32.9 32.0 - 36.0 g/dL Final   08/16/2024 10:54 AM 32.9 32.0 - 36.0 g/dL Final     RDW   Date/Time Value Ref Range Status   09/25/2024 09:32 AM 14.4 11.5 - 14.5 % Final   08/16/2024 10:54 AM 14.9 (H) 11.5 - 14.5 % Final   08/16/2024 10:54 AM 14.9 (H) 11.5 - 14.5 % Final     Platelets   Date/Time Value Ref Range Status   09/25/2024 09:32  150 - 450 x10*3/uL Final   08/16/2024 10:54  150 - 450 x10*3/uL Final   08/16/2024 10:54  150 - 450 x10*3/uL Final     MPV   Date/Time Value Ref Range Status   10/26/2023 11:20 AM 12.2 (H) 7.5 - 11.5 fL Final     Neutrophils %   Date/Time Value Ref Range Status   09/25/2024 09:32 AM 29.1 40.0 - 80.0 % Final   08/16/2024 10:54 AM 39.3 40.0 - 80.0 % Final   03/25/2024 10:13 AM 30.7 40.0 - 80.0 % Final     Immature Granulocytes %, Automated   Date/Time Value Ref Range Status   09/25/2024 09:32 AM 0.4 0.0 - 0.9 % Final     Comment:     Immature Granulocyte Count (IG) includes promyelocytes, myelocytes and metamyelocytes but does not include bands. Percent differential counts (%) should be interpreted in the context of the absolute cell counts (cells/UL).   08/16/2024 10:54 AM 0.5 0.0 - 0.9 % Final     Comment:     Immature Granulocyte Count (IG) includes promyelocytes, myelocytes and metamyelocytes but does not include bands. Percent differential counts (%) should be interpreted in the context of the absolute cell counts (cells/UL).   03/25/2024 10:13 AM 0.3 0.0 - 0.9 % Final     Comment:     Immature Granulocyte Count (IG) includes promyelocytes, myelocytes and metamyelocytes but does not include bands. Percent differential counts (%) should be interpreted in the context of the absolute cell counts (cells/UL).     Lymphocytes %   Date/Time Value Ref Range Status   09/25/2024 09:32 AM 65.1 13.0 - 44.0 % Final   08/16/2024 10:54 AM 55.6 13.0 -  44.0 % Final   03/25/2024 10:13 AM 62.5 13.0 - 44.0 % Final     Monocytes %   Date/Time Value Ref Range Status   09/25/2024 09:32 AM 4.0 2.0 - 10.0 % Final   08/16/2024 10:54 AM 4.0 2.0 - 10.0 % Final   03/25/2024 10:13 AM 5.1 2.0 - 10.0 % Final     Eosinophils %   Date/Time Value Ref Range Status   09/25/2024 09:32 AM 1.1 0.0 - 6.0 % Final   08/16/2024 10:54 AM 0.2 0.0 - 6.0 % Final   03/25/2024 10:13 AM 1.0 0.0 - 6.0 % Final     Basophils %   Date/Time Value Ref Range Status   09/25/2024 09:32 AM 0.3 0.0 - 2.0 % Final   08/16/2024 10:54 AM 0.4 0.0 - 2.0 % Final   03/25/2024 10:13 AM 0.4 0.0 - 2.0 % Final     Neutrophils Absolute   Date/Time Value Ref Range Status   09/25/2024 09:32 AM 5.31 1.60 - 5.50 x10*3/uL Final     Comment:     Percent differential counts (%) should be interpreted in the context of the absolute cell counts (cells/uL).   08/16/2024 10:54 AM 6.72 (H) 1.60 - 5.50 x10*3/uL Final     Comment:     Percent differential counts (%) should be interpreted in the context of the absolute cell counts (cells/uL).   03/25/2024 10:13 AM 4.34 1.60 - 5.50 x10*3/uL Final     Comment:     Percent differential counts (%) should be interpreted in the context of the absolute cell counts (cells/uL).     Immature Granulocytes Absolute, Automated   Date/Time Value Ref Range Status   09/25/2024 09:32 AM 0.07 0.00 - 0.50 x10*3/uL Final   08/16/2024 10:54 AM 0.08 0.00 - 0.50 x10*3/uL Final   03/25/2024 10:13 AM 0.04 0.00 - 0.50 x10*3/uL Final     Lymphocytes Absolute   Date/Time Value Ref Range Status   09/25/2024 09:32 AM 11.87 (H) 0.80 - 3.00 x10*3/uL Final   08/16/2024 10:54 AM 9.49 (H) 0.80 - 3.00 x10*3/uL Final   03/25/2024 10:13 AM 8.81 (H) 0.80 - 3.00 x10*3/uL Final     Monocytes Absolute   Date/Time Value Ref Range Status   09/25/2024 09:32 AM 0.73 0.05 - 0.80 x10*3/uL Final   08/16/2024 10:54 AM 0.69 0.05 - 0.80 x10*3/uL Final   03/25/2024 10:13 AM 0.72 0.05 - 0.80 x10*3/uL Final     Eosinophils Absolute    Date/Time Value Ref Range Status   09/25/2024 09:32 AM 0.20 0.00 - 0.40 x10*3/uL Final   08/16/2024 10:54 AM 0.04 0.00 - 0.40 x10*3/uL Final   03/25/2024 10:13 AM 0.14 0.00 - 0.40 x10*3/uL Final     3/23  CD5+ B-cell clonal population with a chronic lymphocytic leukemia/small lymphocytic lymphoma (CLL/SLL)-like phenotype.  No discrete blast population identified.  No abnormality of granulocytes or monocytes noted.    The monoclonal B-cell count is estimated at 3.3 x 10E9/L. While the clonal B-cell population has a phenotype consistent with CLL/SLL, in the absence of   lymphadenopathy, organomegaly or extramedullary disease, a clonal population less than 5x10E9/L is consistent with monoclonal B-cell lymphocytosis (MBL).   Of note, there is heterogeneity of the CD5+ B-cell population, with a subpopulation showing dimmer expression of CD19, CD45, and negative for sKappa/sLambda, and the other subpopulation with higher level of expression of CD19 and CD45, being dimly  positive  for CD25 and sLambda+. It is unclear if this represent two separate clonal processes or heterogeneity of a single clonal disorder (favored, as both are CD5+/CD23+). Clinical and morphologic correlation is suggested.      9/4/2024  Diagnosis   --Immunophenotypic findings most consistent with chronic lymphocytic leukemia/small lymphocytic lymphoma, see note.     Note: Clinical and morphologic correlation is suggested.         Electronically signed by Mode Mijares MD on 9/7/2024 at 1708        By the signature on this report, the individual or group listed as making the Final Interpretation/Diagnosis certifies that they have reviewed this case and the staining reactivity of the antibodies and reagents in the analysis were determined to be acceptable. Diagnostic interpretation performed at [facility]   Cell Populations    Abnormal Cell Population: Lymphocytes     Percentage:  70 %         Phenotype   Marker Interpretation      CD1c  Negative   CD2 Negative   CD3 Negative   CD4 Negative   CD5 Positive dim   CD7 Negative   CD8 Negative   CD10 Negative   CD11c Dim-negative   CD14 Negative   CD19 Positive moderate   CD20 Positive dim   CD23 Positive moderate   CD26 Negative   CD30 Negative   CD38 Negative   CD40 Positive moderate   CD43 Positive dim-moderate   CD45 Positive moderate   CD56 Negative   CD71 Dim-negative   CD79b Dim-negative      Kappa Negative   Lambda Positive dim-moderate                Flow Differential    Lymphocyte: 97 %                  CD3+CD4+: 17 % ;                                   CD3+CD8+: 4 % ;                                     Natural Killer Cells: <1 %                               CD19+: 74 %                                          B Cell Light Chain Expression: Monoclonal                                                       Surface Kappa/Surface Lambda: 1:95                                   Flow Test Ordered  not established Lymphoma Panel   Specimen Viability  not established High   Cell Count  not established x10*3/uL 19.90   Number of Cells Collected  not established per tube 100,000.00   Methodology    Reference ranges not established.     This test is a multicolor, whole blood lysis assay. It was developed and its performance characteristics determined by the Department of Pathology, [facility], and has not been cleared or approved by the U.S. Food and Drug Administration. The laboratory is regulated under CLIA as qualified to perform high complexity testing. This test is used for clinical purposes. It should not be regarded as investigational or for research.     Immunophenotypic analysis was performed using the following antibodies: 1A: CD45. 1B: CD71, CD30, CD40, CD95, CD14, CD45. 1C: CD56, CD7, CD4, CD8, CD3, CD45. 1D: CD2, CD26, CD4, CD5, CD3, CD45. 1E: CD43, CD23, CD20, CD5, CD19, CD45. 1F: CD11c, , CD1c, CD79b, CD19, CD45. 1G: TRBC1, TCR Gamma/Delta, CD4, CD8, CD3, CD45.  1H: Kappa Surface, Lambda Surface, CD38, CD10, CD19, CD45.           Assessment/Plan     83 yo woman with a PMH of depression, chronic constipation, abdominal bloating, recent trouble with memory  referred to benign hematology for leukocytosis (neutrophilia)  by Dr Marilee Yao. History noted above.    Recently found to have positive lymph nodes with CLL by flow cytometry, see above., found incidentally by Dr Juarez when performing sentinel lymph node biopsy for melanoma. (See above).    Patient asked to delay seeing malignant heme until after her granddaughters wedding that was last week. Chief complaint is itching all over. No night sweats, weight loss or other issues.     Patient is scheduled to see Dr Chris Fabian in a few weeks. I will contact him to see if he would like me to start the mal heme workup prior to her visit. I will contact the patient's daughter Ebony at [phone] once I receive recommendations from Dr Fabian.    I had an extensive discussion with the patient regarding the diagnosis and discussed the plan of therapy, including general considerations regarding side effects and outcomes. Pt understood and gave appropriate teach back about the plan of care. All questions were answered to the patient's satisfaction. The patient is instructed to contact us at any time if questions or problems arise. Thank you for the opportunity to participate in the care of this very pleasant patient.                 Rosanne M Casal, APRN-CNP, DNP

## 2024-09-30 NOTE — PATIENT INSTRUCTIONS
Office visit with Kathryn for follow up on Leukocytosis, Skin Lesion left shoulder  Lesion was positive Melanoma [no residual] and CLL    Diana has an appt on 10/21 with Dr. Miguel at 0845 in [place] to discuss diagnosis and plan  Call our office if need to follow in our office

## 2024-10-01 LAB
CELL COUNT (BLOOD): 18.21 X10*3/UL
CELL POPULATIONS: NORMAL
DIAGNOSIS: NORMAL
FLOW DIFFERENTIAL: NORMAL
FLOW TEST ORDERED: NORMAL
LAB TEST METHOD: NORMAL
NUMBER OF CELLS COLLECTED: NORMAL PER TUBE
PATH REPORT.TOTAL CANCER: NORMAL
SIGNATURE COMMENT: NORMAL
SPECIMEN VIABILITY: NORMAL

## 2024-10-10 ENCOUNTER — APPOINTMENT (OUTPATIENT)
Dept: PRIMARY CARE | Facility: CLINIC | Age: 84
End: 2024-10-10
Payer: MEDICARE

## 2024-10-10 DIAGNOSIS — F33.41 RECURRENT MAJOR DEPRESSIVE DISORDER, IN PARTIAL REMISSION (CMS-HCC): Primary | ICD-10-CM

## 2024-10-10 ASSESSMENT — PATIENT HEALTH QUESTIONNAIRE - PHQ9
SUM OF ALL RESPONSES TO PHQ9 QUESTIONS 1 & 2: 2
SUM OF ALL RESPONSES TO PHQ QUESTIONS 1-9: 4
3. TROUBLE FALLING OR STAYING ASLEEP: NOT AT ALL
6. FEELING BAD ABOUT YOURSELF - OR THAT YOU ARE A FAILURE OR HAVE LET YOURSELF OR YOUR FAMILY DOWN: SEVERAL DAYS
10. IF YOU CHECKED OFF ANY PROBLEMS, HOW DIFFICULT HAVE THESE PROBLEMS MADE IT FOR YOU TO DO YOUR WORK, TAKE CARE OF THINGS AT HOME, OR GET ALONG WITH OTHER PEOPLE: NOT DIFFICULT AT ALL
2. FEELING DOWN, DEPRESSED OR HOPELESS: SEVERAL DAYS
5. POOR APPETITE OR OVEREATING: NOT AT ALL
8. MOVING OR SPEAKING SO SLOWLY THAT OTHER PEOPLE COULD HAVE NOTICED. OR THE OPPOSITE, BEING SO FIGETY OR RESTLESS THAT YOU HAVE BEEN MOVING AROUND A LOT MORE THAN USUAL: NOT AT ALL
9. THOUGHTS THAT YOU WOULD BE BETTER OFF DEAD, OR OF HURTING YOURSELF: SEVERAL DAYS
1. LITTLE INTEREST OR PLEASURE IN DOING THINGS: SEVERAL DAYS
4. FEELING TIRED OR HAVING LITTLE ENERGY: NOT AT ALL
7. TROUBLE CONCENTRATING ON THINGS, SUCH AS READING THE NEWSPAPER OR WATCHING TELEVISION: NOT AT ALL

## 2024-10-10 ASSESSMENT — ANXIETY QUESTIONNAIRES
1. FEELING NERVOUS, ANXIOUS, OR ON EDGE: MORE THAN HALF THE DAYS
GAD7 TOTAL SCORE: 9
7. FEELING AFRAID AS IF SOMETHING AWFUL MIGHT HAPPEN: SEVERAL DAYS
2. NOT BEING ABLE TO STOP OR CONTROL WORRYING: SEVERAL DAYS
IF YOU CHECKED OFF ANY PROBLEMS ON THIS QUESTIONNAIRE, HOW DIFFICULT HAVE THESE PROBLEMS MADE IT FOR YOU TO DO YOUR WORK, TAKE CARE OF THINGS AT HOME, OR GET ALONG WITH OTHER PEOPLE: SOMEWHAT DIFFICULT
5. BEING SO RESTLESS THAT IT IS HARD TO SIT STILL: SEVERAL DAYS
3. WORRYING TOO MUCH ABOUT DIFFERENT THINGS: MORE THAN HALF THE DAYS
4. TROUBLE RELAXING: SEVERAL DAYS
6. BECOMING EASILY ANNOYED OR IRRITABLE: SEVERAL DAYS

## 2024-10-10 NOTE — PROGRESS NOTES
Collaborative Care (CoCM)  Progress Note    Type of Interaction: In Office    Start Time: 10:08 AM    End Time: 11:00 AM    Appointment: Scheduled    Reason for Visit:   Chief Complaint   Patient presents with    Follow-up       Interval History / Patient Symptoms:   Diana Azul is a 84 y.o. female currently enrolled in the CoC program for symptom monitoring, brief therapy, and support. Patient presents today for follow up for Collaborative Care services.     Metrics:  Patient Health Questionnaire-9 Score: 4 (10/10/2024 10:55 AM)  ROJELIO-7 Total Score: 9 (10/10/2024 10:46 AM)    Interventions Provided:   Review progress; symptom monitoring    Progress Made:   Moderate    Response to Intervention:  We discussed the following elements during appointment:   Feeling down- still has itchiness and was dx with cancer and has an appointment with  on 10/21.  Spending time with great-grandchildren this week while they have been visiting.  BHM normalized anxiety and difficulty keeping things straight when there are a lot of events or concerns going on in her life.  Has been taking propranolol in the morning to help get through some of the anxious thoughts.    When pt went to dermatologist yesterday, they said that she might have scabies and provided an rx for her to use.  Pt was itching during this appointment and discussed not itching with her fingernails, as it can get under her nails.  Also pt stated she needs to cut her nails shorter to easier clean.    Discussed ongoing behavioral health services.  Recommended that patient talk to the oncology doctor at appointment to see if there is a  in the department that can help her navigate the cancer diagnosis.  Also let patient know that M is available by phone if she needs anything.  Patient and BHM pulled up her ROJELIO and PHQ scores since starting the program- there have been some spikes, but overall trended down with both her anxiety and PHQ.  ROJELIO was  slightly higher today because of situational concerns and patient also acknowledged this.    Patient will follow up with BHM as needed through phone.      Patient was engaged, responsive, and interactive.     Plan:   Patient being discharged from collaborative care services; plan in place that patient can call BHM if needing to check in.      Follow Up / Next Appointment:   patient being discharged from collaborative care; no ongoing appointments scheduled.

## 2024-10-21 ENCOUNTER — TUMOR BOARD CONFERENCE (OUTPATIENT)
Dept: HEMATOLOGY/ONCOLOGY | Facility: HOSPITAL | Age: 84
End: 2024-10-21
Payer: MEDICARE

## 2024-10-21 ENCOUNTER — OFFICE VISIT (OUTPATIENT)
Dept: HEMATOLOGY/ONCOLOGY | Facility: CLINIC | Age: 84
End: 2024-10-21
Payer: MEDICARE

## 2024-10-21 ENCOUNTER — LAB (OUTPATIENT)
Dept: LAB | Facility: CLINIC | Age: 84
End: 2024-10-21
Payer: MEDICARE

## 2024-10-21 VITALS
HEIGHT: 64 IN | WEIGHT: 158.29 LBS | SYSTOLIC BLOOD PRESSURE: 173 MMHG | OXYGEN SATURATION: 97 % | TEMPERATURE: 98.1 F | DIASTOLIC BLOOD PRESSURE: 80 MMHG | HEART RATE: 64 BPM | RESPIRATION RATE: 18 BRPM | BODY MASS INDEX: 27.02 KG/M2

## 2024-10-21 DIAGNOSIS — C43.62 MALIGNANT MELANOMA OF LEFT SHOULDER (MULTI): Primary | ICD-10-CM

## 2024-10-21 DIAGNOSIS — C91.10 CLL (CHRONIC LYMPHOCYTIC LEUKEMIA) (MULTI): Primary | ICD-10-CM

## 2024-10-21 DIAGNOSIS — C91.10 CLL (CHRONIC LYMPHOCYTIC LEUKEMIA) (MULTI): ICD-10-CM

## 2024-10-21 LAB
ALBUMIN SERPL BCP-MCNC: 4.5 G/DL (ref 3.4–5)
ALP SERPL-CCNC: 68 U/L (ref 33–136)
ALT SERPL W P-5'-P-CCNC: 25 U/L (ref 7–45)
ANION GAP SERPL CALC-SCNC: 15 MMOL/L (ref 10–20)
AST SERPL W P-5'-P-CCNC: 23 U/L (ref 9–39)
BASOPHILS # BLD MANUAL: 0 X10*3/UL (ref 0–0.1)
BASOPHILS NFR BLD MANUAL: 0 %
BILIRUB SERPL-MCNC: 0.5 MG/DL (ref 0–1.2)
BUN SERPL-MCNC: 18 MG/DL (ref 6–23)
CALCIUM SERPL-MCNC: 10 MG/DL (ref 8.6–10.6)
CHLORIDE SERPL-SCNC: 105 MMOL/L (ref 98–107)
CO2 SERPL-SCNC: 29 MMOL/L (ref 21–32)
CREAT SERPL-MCNC: 0.79 MG/DL (ref 0.5–1.05)
EGFRCR SERPLBLD CKD-EPI 2021: 73 ML/MIN/1.73M*2
EOSINOPHIL # BLD MANUAL: 0 X10*3/UL (ref 0–0.4)
EOSINOPHIL NFR BLD MANUAL: 0 %
ERYTHROCYTE [DISTWIDTH] IN BLOOD BY AUTOMATED COUNT: 14.2 % (ref 11.5–14.5)
GLUCOSE SERPL-MCNC: 99 MG/DL (ref 74–99)
HCT VFR BLD AUTO: 38.5 % (ref 36–46)
HGB BLD-MCNC: 12.3 G/DL (ref 12–16)
IGA SERPL-MCNC: 18 MG/DL (ref 70–400)
IGG SERPL-MCNC: 265 MG/DL (ref 700–1600)
IGM SERPL-MCNC: <5 MG/DL (ref 40–230)
IMM GRANULOCYTES # BLD AUTO: 0.14 X10*3/UL (ref 0–0.5)
IMM GRANULOCYTES NFR BLD AUTO: 0.6 % (ref 0–0.9)
LDH SERPL L TO P-CCNC: 190 U/L (ref 84–246)
LYMPHOCYTES # BLD MANUAL: 10.72 X10*3/UL (ref 0.8–3)
LYMPHOCYTES NFR BLD MANUAL: 46 %
MCH RBC QN AUTO: 30.3 PG (ref 26–34)
MCHC RBC AUTO-ENTMCNC: 31.9 G/DL (ref 32–36)
MCV RBC AUTO: 95 FL (ref 80–100)
MONOCYTES # BLD MANUAL: 0.93 X10*3/UL (ref 0.05–0.8)
MONOCYTES NFR BLD MANUAL: 4 %
NEUTS SEG # BLD MANUAL: 9.32 X10*3/UL (ref 1.6–5)
NEUTS SEG NFR BLD MANUAL: 40 %
NRBC BLD-RTO: ABNORMAL /100{WBCS}
PLATELET # BLD AUTO: 212 X10*3/UL (ref 150–450)
POLYCHROMASIA BLD QL SMEAR: ABNORMAL
POTASSIUM SERPL-SCNC: 4.6 MMOL/L (ref 3.5–5.3)
PROT SERPL-MCNC: 6.3 G/DL (ref 6.4–8.2)
PROT SERPL-MCNC: 6.3 G/DL (ref 6.4–8.2)
RBC # BLD AUTO: 4.06 X10*6/UL (ref 4–5.2)
RBC MORPH BLD: ABNORMAL
SODIUM SERPL-SCNC: 144 MMOL/L (ref 136–145)
STOMATOCYTES BLD QL SMEAR: ABNORMAL
TOTAL CELLS COUNTED BLD: 100
VARIANT LYMPHS # BLD MANUAL: 2.33 X10*3/UL (ref 0–0.3)
VARIANT LYMPHS NFR BLD: 10 %
WBC # BLD AUTO: 23.3 X10*3/UL (ref 4.4–11.3)

## 2024-10-21 PROCEDURE — 99214 OFFICE O/P EST MOD 30 MIN: CPT | Performed by: STUDENT IN AN ORGANIZED HEALTH CARE EDUCATION/TRAINING PROGRAM

## 2024-10-21 PROCEDURE — 86334 IMMUNOFIX E-PHORESIS SERUM: CPT

## 2024-10-21 PROCEDURE — 1126F AMNT PAIN NOTED NONE PRSNT: CPT | Performed by: STUDENT IN AN ORGANIZED HEALTH CARE EDUCATION/TRAINING PROGRAM

## 2024-10-21 PROCEDURE — 85007 BL SMEAR W/DIFF WBC COUNT: CPT

## 2024-10-21 PROCEDURE — 36415 COLL VENOUS BLD VENIPUNCTURE: CPT

## 2024-10-21 PROCEDURE — 1157F ADVNC CARE PLAN IN RCRD: CPT | Performed by: STUDENT IN AN ORGANIZED HEALTH CARE EDUCATION/TRAINING PROGRAM

## 2024-10-21 PROCEDURE — 1159F MED LIST DOCD IN RCRD: CPT | Performed by: STUDENT IN AN ORGANIZED HEALTH CARE EDUCATION/TRAINING PROGRAM

## 2024-10-21 PROCEDURE — 84165 PROTEIN E-PHORESIS SERUM: CPT

## 2024-10-21 PROCEDURE — 82784 ASSAY IGA/IGD/IGG/IGM EACH: CPT

## 2024-10-21 PROCEDURE — 80053 COMPREHEN METABOLIC PANEL: CPT

## 2024-10-21 PROCEDURE — 1123F ACP DISCUSS/DSCN MKR DOCD: CPT | Performed by: STUDENT IN AN ORGANIZED HEALTH CARE EDUCATION/TRAINING PROGRAM

## 2024-10-21 PROCEDURE — 88185 FLOWCYTOMETRY/TC ADD-ON: CPT | Mod: TC

## 2024-10-21 PROCEDURE — 84155 ASSAY OF PROTEIN SERUM: CPT | Mod: 59

## 2024-10-21 PROCEDURE — 83615 LACTATE (LD) (LDH) ENZYME: CPT

## 2024-10-21 PROCEDURE — 85027 COMPLETE CBC AUTOMATED: CPT

## 2024-10-21 ASSESSMENT — PAIN SCALES - GENERAL: PAINLEVEL_OUTOF10: 0-NO PAIN

## 2024-10-21 NOTE — PROGRESS NOTES
History    Patient ID: Diana Azul is a 84 y.o. female.  Followed for B-cell monoclonal lymphocytosis, recently WBC increased and met definition of CLL  Also s/p surgery for melanoma       Review of Systems   All other systems reviewed and are negative.  She has itching she attributes to recent rash, seeing a dermatologist    Exam   Physical Exam  Vitals reviewed.   Constitutional:       Appearance: Normal appearance. She is normal weight.   HENT:      Head: Normocephalic and atraumatic.      Nose: Nose normal.      Mouth/Throat:      Mouth: Mucous membranes are moist.      Pharynx: Oropharynx is clear.   Eyes:      Conjunctiva/sclera: Conjunctivae normal.      Pupils: Pupils are equal, round, and reactive to light.   Cardiovascular:      Rate and Rhythm: Normal rate and regular rhythm.      Pulses: Normal pulses.      Heart sounds: Normal heart sounds.   Pulmonary:      Effort: Pulmonary effort is normal.      Breath sounds: Normal breath sounds.   Abdominal:      General: Abdomen is flat. Bowel sounds are normal.      Palpations: Abdomen is soft.   Musculoskeletal:      Cervical back: Normal range of motion.   Skin:     General: Skin is warm and dry.   Neurological:      General: No focal deficit present.      Mental Status: She is alert and oriented to person, place, and time. Mental status is at baseline.   Psychiatric:         Mood and Affect: Mood normal.         Behavior: Behavior normal.         Thought Content: Thought content normal.         Judgment: Judgment normal.       Diagnostic Results   Lab Results   Component Value Date    WBC 18.2 (H) 09/25/2024    HGB 11.3 (L) 09/25/2024    HCT 37.4 09/25/2024    MCV 97 09/25/2024     09/25/2024     Lab Results   Component Value Date    GLUCOSE 101 (H) 09/25/2024    CALCIUM 9.3 09/25/2024     09/25/2024    K 4.0 09/25/2024    CO2 25 09/25/2024     (H) 09/25/2024    BUN 14 09/25/2024    CREATININE 0.80 09/25/2024       Assessment/Plan    Diagnoses and all orders for this visit:  CLL (chronic lymphocytic leukemia) (Multi)  -     Referral to Hematology and Oncology  -     IgHV Mutation Analysis; Future  -     Flow Cytometry Test; Future  -     Serum Protein Electrophoresis; Future  -     Immunoglobulins (IgG, IgA, IgM); Future  -     CBC and Auto Differential; Standing  -     Comprehensive Metabolic Panel; Standing  -     Lactate Dehydrogenase; Standing  -     Clinic Appointment Request Follow Up; Future  - will start with CLL testing, plan for every 6 month monitoring  30 minutes were spent reviewing the patients chart, discussing symptoms, performing physical exam, reviewing lab results with patient and going over the plan of care

## 2024-10-21 NOTE — TUMOR BOARD NOTE
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 Moderately transected on the deep margin.     S/p WLE with 2 cm margins and SLNB, showing clear margins without residual melanoma identified. SLNB shows 2 left axillary lymph nodes consistent with chronic lymphocytic leukemia, no lymph nodes with melanoma (0/2). She was referred to medical oncology for additional management. Melanoma adequately surgically treated.    Recommendation: Annual H&P.    Review Multidisciplinary Cutaneous Oncology Conference recommendation with patient.  Continue routine follow up and total body skin exams with Patti Durán.    Follow Up:  Luke Rothermel, Amy Polster, Rosanne Casal      History and Physical Exam  Dermatologic History:   84 y.o. female with a biopsy of the left lateral shoulder on 7/31/2024 showing a non-ulcerated melanoma,  spindle cell  type, Breslow: at least 3.6 mm. Moderately transected on the deep margin.    S/p WLE with 2 cm marigns and SLNB on 9/4/2024    Past Medical History:    Past Medical History:   Diagnosis Date    Anxiety     Compression fracture of T10 vertebra     Depression     Disorder of Eustachian tube, bilateral     GERD (gastroesophageal reflux disease)     Hiatal hernia     History of anxiety disorder 10/03/2013    HLD (hyperlipidemia)     Hypothyroid     Malignant melanoma of left shoulder     Memory deficit        Family History of DNS/MM:  Unknown    Skin:  Left shoulder biopsy site without residual disease     Lymphatic:  No LAD noted      Pathology  Dermatopathology- DERM LAB: [id number]   Collected 9/4/2024  10:05     A. NODE, LEFT AXILLARY SENTINEL LYMPH NODE #1 COUNT 215, SENTINEL LYMPH NODE BIOPSY:  CONSISTENT WITH CHRONIC LYMPHOCYTIC LEUKEMIA IN 1/1 LYMPH NODE (SEE COMMENT):     Comment: Melanocytic immunostains for HMB45, Melan-A, and SOX-10 were performed on blocks A1 through A9 and are negative for definitive melanocytic malignancy. The lymph node is enlarged and filled with a monomorphic population of mononuclear cells. There is loss of germinal center formation and expected lymph node architecture. The monomorphic expansile population was evaluated with immunohistochemistry on block A3, and the population of interest is positive with CD20 (weakly), CD79a, CD43, CD5 (weak/patchy in areas), and CD23. CD3 highlights scattered background T-cells. All control slides stain appropriately.      The findings are consistent with the patient's known history of chronic lymphocytic leukemia involving a lymph node.     B. NODE, LEFT AXILLARY SENTINEL LYMPH NODE #2 COUNT 476, SENTINEL LYMPH NODE BIOPSY:  CONSISTENT WITH CHRONIC LYMPHOCYTIC LEUKEMIA IN 1/1 LYMPH NODE (SEE COMMENT):     Comment: Melanocytic immunostains for HMB45, SOX-10, and Melan-A (controls appropriate) are negative for melanocytic malignancy. The current lymph node has a similar architecture and replacement by monomorphic mononuclear cells as described in specimen A above, and chronic lymphocytic leukemia is favored.     C. SKIN, LEFT SHOULDER MELANOMA SHORT PROXIMAL 12 O'CLOCK, LONG ANTERIOR 9 O'CLOCK, WIDE EXCISION:  -REACTIVE EPIDERMAL AND DERMAL CHANGES, CONSISTENT WITH PRIOR PROCEDURE SITE, INKED MARGINS FREE IN THESE PLANES OF SECTION WITHOUT RESIDUAL MELANOMA IDENTIFIED.  -MULTIFOCAL INCIDENTAL MELANOCYTIC NEVI, CLEAR OF THE INKED MARGINS (SEE COMMENT):     Comment: Prior procedure site changes are present in blocks C11 through C13 and widely clear of the inked margins. In block C3, there is an incidental junctional lentiginous nevus that is clear of  the inked margins. In block C8, there is a compound dysplastic nevus with mild atypia that is clear of the inked margins. Deeper additional sectioning was performed on blocks C8 and C17.     **Electronically signed out by ZAKIA GALLAGHER MD**  ___________________________________________________________________________________________________    Derm Consult: [id number]   BX: 7/31/2024    3 SLIDES, [facility], #[id number] (BX: 07/31/2024)     SKIN, LEFT LATERAL SHOULDER, SHAVE REMOVAL:  MALIGNANT MELANOMA, BRESLOW THICKNESS AT LEAST 3.6 MM, PRESENT ON THE DEEP MARGIN, SEE NOTE.     Note: Microscopic examination reveals a specimen that extends into the subcutaneous fat. There is an asymmetric proliferation of nested and single atypical melanocytes along the dermal-epidermal junction in a continuous or near continuous fashion. There are fascicles of spindle cells in the dermis with a patchy perivascular lymphocytic infiltrate. The spindle cells stain with antibodies against SOX-10 as do the epidermal melanocytes.      ** Electronically signed out by Mary Ellen Heller MD **  SPECIMEN   Procedure  Biopsy, shave   Specimen Laterality  Left   TUMOR   Tumor Site  Skin of upper limb and shoulder: Left lateral shoulder        Histologic Type  It has features of a spindle cell melanoma.   Maximum Tumor (Breslow) Thickness (Millimeters)  At least: 3.6 mm     Moderately transected on the deep margin.   Ulceration  Not identified   Anatomic (Steve) Level  At least level: V     Moderately transected on the deep margin.   Mitotic Rate  None identified   Microsatellite(s)  Not identified   Lymphovascular Invasion  Not identified   Neurotropism  Not identified   Tumor-Infiltrating Lymphocytes  Present, nonbrisk   Tumor Regression  Not identified   MARGINS     Margin Status for Invasive Melanoma  Invasive melanoma present at margin   Margin(s) Involved by Invasive Melanoma  Deep   Margin Status for Melanoma in  situ  All margins negative for melanoma in situ   PATHOLOGIC STAGE CLASSIFICATION (pTNM, AJCC 8th Edition)     pT Category  pT3a       Radiology      Clinical Images  8/9/2024

## 2024-10-25 DIAGNOSIS — F41.9 ANXIETY DISORDER, UNSPECIFIED: ICD-10-CM

## 2024-10-25 LAB
ALBUMIN: 4.5 G/DL (ref 3.4–5)
ALPHA 1 GLOBULIN: 0.2 G/DL (ref 0.2–0.6)
ALPHA 2 GLOBULIN: 0.6 G/DL (ref 0.4–1.1)
BETA GLOBULIN: 0.7 G/DL (ref 0.5–1.2)
CELL COUNT (BLOOD): 23.3 X10*3/UL
CELL POPULATIONS: NORMAL
DIAGNOSIS: NORMAL
FLOW DIFFERENTIAL: NORMAL
FLOW TEST ORDERED: NORMAL
GAMMA GLOBULIN: 0.3 G/DL (ref 0.5–1.4)
IMMUNOFIXATION COMMENT: NORMAL
LAB TEST METHOD: NORMAL
NUMBER OF CELLS COLLECTED: NORMAL PER TUBE
PATH REPORT.TOTAL CANCER: NORMAL
PATH REVIEW - SERUM IMMUNOFIXATION: NORMAL
PATH REVIEW-SERUM PROTEIN ELECTROPHORESIS: ABNORMAL
PROTEIN ELECTROPHORESIS COMMENT: ABNORMAL
SIGNATURE COMMENT: NORMAL
SPECIMEN VIABILITY: NORMAL

## 2024-10-29 LAB
ELECTRONICALLY SIGNED BY: NORMAL
IGHV RESULTS: NORMAL

## 2024-10-31 ENCOUNTER — DOCUMENTATION (OUTPATIENT)
Dept: PRIMARY CARE | Facility: CLINIC | Age: 84
End: 2024-10-31
Payer: MEDICARE

## 2024-10-31 DIAGNOSIS — F33.41 RECURRENT MAJOR DEPRESSIVE DISORDER, IN PARTIAL REMISSION (CMS-HCC): Primary | ICD-10-CM

## 2024-10-31 PROCEDURE — 99493 SBSQ PSYC COLLAB CARE MGMT: CPT | Performed by: FAMILY MEDICINE

## 2024-11-01 RX ORDER — BUPROPION HYDROCHLORIDE 150 MG/1
TABLET ORAL
Qty: 90 TABLET | Refills: 1 | Status: SHIPPED | OUTPATIENT
Start: 2024-11-01

## 2024-11-22 ENCOUNTER — TELEPHONE (OUTPATIENT)
Dept: PRIMARY CARE | Facility: CLINIC | Age: 84
End: 2024-11-22
Payer: MEDICARE

## 2024-11-22 ENCOUNTER — OFFICE VISIT (OUTPATIENT)
Dept: URGENT CARE | Age: 84
End: 2024-11-22
Payer: MEDICARE

## 2024-11-22 VITALS
RESPIRATION RATE: 16 BRPM | DIASTOLIC BLOOD PRESSURE: 84 MMHG | TEMPERATURE: 96.4 F | SYSTOLIC BLOOD PRESSURE: 165 MMHG | HEART RATE: 73 BPM | OXYGEN SATURATION: 98 %

## 2024-11-22 DIAGNOSIS — Z20.7 SCABIES EXPOSURE: ICD-10-CM

## 2024-11-22 DIAGNOSIS — R21 RASH: Primary | ICD-10-CM

## 2024-11-22 RX ORDER — IVERMECTIN 3 MG/1
3 TABLET ORAL ONCE
Qty: 2 TABLET | Refills: 0 | Status: SHIPPED | OUTPATIENT
Start: 2024-11-22 | End: 2024-11-22

## 2024-11-22 NOTE — TELEPHONE ENCOUNTER
Patient call stating that the rash she came to see you back in August and the itching that she is still experiencing its due to scabies. She is getting this self-diagnosis  because her grandson and daughter  has the same symptoms and their doctor told them it was that. They went to a wedding back in June together and everybody was having the same symptoms. Patient want to be prescribe for Ivermectin tablets since they got prescribe for that too. Patient is aware that she might need to see you to confirm is scabies and to prescribe this medication but, I will send the request message. She went to her dermatologist but unfortunately he is out of town for this request.

## 2024-11-22 NOTE — PROGRESS NOTES
Subjective   History of Present Illness: Diana Azul is a 84 y.o. female. They present today with a chief complaint of Rash (Pt has a rash but her grandson and daughter have scabies ).  Rash is very pruritic lillian. At [facility]. Rash is on b/l arms and abdomen. Tried multiple ointments by derm w/o relief. Her grandson was diagnosed with scabies yesterday.     Past Medical History  Allergies as of 11/22/2024    (No Known Allergies)       (Not in a hospital admission)       Past Medical History:   Diagnosis Date    Anxiety     Compression fracture of T10 vertebra     Depression     Disorder of Eustachian tube, bilateral     GERD (gastroesophageal reflux disease)     Hiatal hernia     History of anxiety disorder 10/03/2013    HLD (hyperlipidemia)     Hypothyroid     Malignant melanoma of left shoulder     Memory deficit        No past surgical history on file.     reports that she has never smoked. She has never used smokeless tobacco. She reports current alcohol use of about 1.0 standard drink of alcohol per week. She reports that she does not use drugs.    Review of Systems  Review of Systems                               Objective    Vitals:    11/22/24 1430   BP: 165/84   BP Location: Left arm   Patient Position: Sitting   Pulse: 73   Resp: 16   Temp: 35.8 °C (96.4 °F)   SpO2: 98%     No LMP recorded. Patient is postmenopausal.    Physical Exam  Vitals reviewed.   HENT:      Head: Normocephalic and atraumatic.      Nose: Nose normal.      Mouth/Throat:      Mouth: Mucous membranes are moist.   Eyes:      Extraocular Movements: Extraocular movements intact.      Conjunctiva/sclera: Conjunctivae normal.      Pupils: Pupils are equal, round, and reactive to light.   Cardiovascular:      Rate and Rhythm: Normal rate and regular rhythm.   Pulmonary:      Effort: Pulmonary effort is normal.      Breath sounds: Normal breath sounds.   Skin:     General: Skin is warm.      Findings: Rash present.   Neurological:       Mental Status: She is alert and oriented to person, place, and time.   Psychiatric:         Mood and Affect: Mood normal.         Behavior: Behavior normal.             Point of Care Test & Imaging Results from this visit  No results found for this visit on 11/22/24.   No results found.    Diagnostic study results (if any) were reviewed by Jose Quiroga PA-C.    Assessment/Plan   Allergies, medications, history, and pertinent labs/EKGs/Imaging reviewed by Jose Quiroga PA-C.     Medical Decision Making  -         Patient is educated about their diagnoses.     -          Discussed medications benefits and adverse effects.     -          Answered all patient’s questions.     -          Patient will call 911 or go to the nearest ED if worsen symptoms .     -          Patient is agreeable to the plan of care and is deemed stable upon discharge.     -          Follow up with your primary care provider in two days.      Orders and Diagnoses  Diagnoses and all orders for this visit:  Rash  -     ivermectin (Stromectol) 3 mg tablet; Take 1 tablet (3 mg) by mouth 1 time for 1 dose. Repeat dose after 2 weeks from 1st dose.  Scabies exposure  -     ivermectin (Stromectol) 3 mg tablet; Take 1 tablet (3 mg) by mouth 1 time for 1 dose. Repeat dose after 2 weeks from 1st dose.      Medical Admin Record      Patient disposition: Home    Electronically signed by Jose Quiroga PA-C  3:02 PM

## 2024-12-08 ENCOUNTER — ANCILLARY PROCEDURE (OUTPATIENT)
Dept: URGENT CARE | Age: 84
End: 2024-12-08
Payer: MEDICARE

## 2024-12-08 ENCOUNTER — OFFICE VISIT (OUTPATIENT)
Dept: URGENT CARE | Age: 84
End: 2024-12-08
Payer: MEDICARE

## 2024-12-08 VITALS
DIASTOLIC BLOOD PRESSURE: 70 MMHG | TEMPERATURE: 97.9 F | OXYGEN SATURATION: 97 % | BODY MASS INDEX: 26.94 KG/M2 | SYSTOLIC BLOOD PRESSURE: 137 MMHG | WEIGHT: 158 LBS | RESPIRATION RATE: 18 BRPM | HEART RATE: 78 BPM

## 2024-12-08 DIAGNOSIS — J18.9 PNEUMONIA OF RIGHT UPPER LOBE DUE TO INFECTIOUS ORGANISM: Primary | ICD-10-CM

## 2024-12-08 DIAGNOSIS — R05.9 COUGH IN ADULT PATIENT: ICD-10-CM

## 2024-12-08 PROCEDURE — 71046 X-RAY EXAM CHEST 2 VIEWS: CPT | Performed by: NURSE PRACTITIONER

## 2024-12-08 PROCEDURE — 1123F ACP DISCUSS/DSCN MKR DOCD: CPT | Performed by: NURSE PRACTITIONER

## 2024-12-08 PROCEDURE — 99214 OFFICE O/P EST MOD 30 MIN: CPT | Performed by: NURSE PRACTITIONER

## 2024-12-08 PROCEDURE — 1157F ADVNC CARE PLAN IN RCRD: CPT | Performed by: NURSE PRACTITIONER

## 2024-12-08 PROCEDURE — 1159F MED LIST DOCD IN RCRD: CPT | Performed by: NURSE PRACTITIONER

## 2024-12-08 RX ORDER — BENZONATATE 200 MG/1
200 CAPSULE ORAL 3 TIMES DAILY PRN
Qty: 42 CAPSULE | Refills: 0 | Status: SHIPPED | OUTPATIENT
Start: 2024-12-08 | End: 2025-01-07

## 2024-12-08 RX ORDER — AZITHROMYCIN 250 MG/1
TABLET, FILM COATED ORAL
Qty: 6 TABLET | Refills: 0 | Status: SHIPPED | OUTPATIENT
Start: 2024-12-08 | End: 2024-12-13

## 2024-12-08 NOTE — PROGRESS NOTES
Subjective   Patient ID: Diana Azul is a 84 y.o. female. They present today with a chief complaint of Cough (Cough for 10 days ).    History of Present Illness  85 yo female coming in for a cough for the last 10 days. She states she has felt a little more tired lately. She states the cough is moist. She denies any shortness of breath.     Past Medical History  Allergies as of 12/08/2024    (No Known Allergies)       (Not in a hospital admission)       Past Medical History:   Diagnosis Date    Anxiety     Compression fracture of T10 vertebra     Depression     Disorder of Eustachian tube, bilateral     GERD (gastroesophageal reflux disease)     Hiatal hernia     History of anxiety disorder 10/03/2013    HLD (hyperlipidemia)     Hypothyroid     Malignant melanoma of left shoulder     Memory deficit        No past surgical history on file.     reports that she has never smoked. She has never used smokeless tobacco. She reports current alcohol use of about 1.0 standard drink of alcohol per week. She reports that she does not use drugs.    Review of Systems  Review of Systems:  General: No weight loss, fatigue, anorexia, insomnia, fever, chills.  Cardiac: No chest pain, palpitations, syncope, near syncope.  Pulmonary:  No shortness of breath, positive cough, no hemoptysis  Heme/lymph: No swollen glands, fever, bleeding  Musculoskeletal: No limb pain, joint pain, joint swelling.  Skin: No rashes  Neuro: No numbness, tingling, headaches                                 Objective    Vitals:    12/08/24 1044   BP: 137/70   Pulse: 78   Resp: 18   Temp: 36.6 °C (97.9 °F)   SpO2: 97%   Weight: 71.7 kg (158 lb)     No LMP recorded. Patient is postmenopausal.    Physical Exam  Physical Exam:  General: Vital noted, no distress. Afebrile  EENT: Posterior oropharynx unremarkable. Uvula in the midline and non-edematous. No PTA. No retropharyngeal mass. No Juan R's angina.  Cardiac: Regular rate and rhythm, no  murmur  Pulmonary: Lungs clear, but diminished on the left,  bilaterally with good aeration. No adventitious breath sounds.  Skin: No rashes  Neuro: No focal neurologic deficits    Procedures    Point of Care Test & Imaging Results from this visit  No results found for this visit on 12/08/24.   XR chest 2 views    Result Date: 12/8/2024  Interpreted By:  Fara Diehl, STUDY: XR CHEST 2 VIEWS;  12/8/2024 11:07 am   INDICATION: Signs/Symptoms:cough.   COMPARISON: 07/26/2022   ACCESSION NUMBER(S): [id number]   ORDERING CLINICIAN: JOSE COBIAN   FINDINGS: CARDIOMEDIASTINAL SILHOUETTE: Cardiomediastinal silhouette is normal in size and configuration.     LUNGS: There is a subtle right upper lobe interstitial pneumonia. There is no focal dense area of consolidation, congestion or pleural fluid. There is left basilar scarring. There is bandlike scarring or atelectasis at the right lateral costophrenic angle, not seen on 07/26/2022   ABDOMEN: No remarkable upper abdominal findings.     BONES: There is moderate compression of the superior endplate of T11, unchanged compared to radiographs of the thoracic spine dated 10/25/2022. There are surgical clips in the left axilla.       Right upper lobe interstitial pneumonia.   MACRO: None   Signed by: Fara Diehl 12/8/2024 11:26 AM Dictation workstation:   [id number]     Diagnostic study results (if any) were reviewed by CHRIS Herrera.    Assessment/Plan   Allergies, medications, history, and pertinent labs/EKGs/Imaging reviewed by CHRIS Herrera.     Medical Decision Making  Testing: chest XR  Treatment: Zithromax and tessalon prescribed  Differential: 1) pneumonia, 2) bronchitis, 3) uri  Plan: Patient will follow up with the PCP in the next 2-3 days. Return for any worsening symptoms or go to the ER for further evaluation. Patient understands return precautions and discharge insturctions.  Impression:   1) pneumonia      Orders and  Diagnoses  Diagnoses and all orders for this visit:  Pneumonia of right upper lobe due to infectious organism  -     azithromycin (Zithromax) 250 mg tablet; Take 2 tabs (500 mg) by mouth today, than 1 daily for 4 days.  -     benzonatate (Tessalon) 200 mg capsule; Take 1 capsule (200 mg) by mouth 3 times a day as needed for cough. Do not crush or chew.  Cough in adult patient  -     XR chest 2 views; Future      Medical Admin Record      Patient disposition: Home    Electronically signed by CHRIS Herrera  11:32 AM

## 2024-12-12 DIAGNOSIS — E78.2 MIXED HYPERLIPIDEMIA: ICD-10-CM

## 2024-12-12 RX ORDER — SIMVASTATIN 40 MG/1
40 TABLET, FILM COATED ORAL NIGHTLY
Qty: 90 TABLET | Refills: 1 | Status: SHIPPED | OUTPATIENT
Start: 2024-12-12

## 2024-12-12 NOTE — TELEPHONE ENCOUNTER
Follow-up visit: 1/16/24 (for rash)  Last visit: 8/29/24 for rash, 7/9/24 annual RTC in 3-6 months but lipids are annual  Last labs: 7/9/24 lipid wnl    Will send 90 day + 1 refills of simvastatin 40mg daily until labs are due

## 2025-01-04 DIAGNOSIS — E03.9 ADULT HYPOTHYROIDISM: ICD-10-CM

## 2025-01-08 RX ORDER — LEVOTHYROXINE SODIUM 25 UG/1
25 TABLET ORAL DAILY
Qty: 30 TABLET | Refills: 0 | Status: SHIPPED | OUTPATIENT
Start: 2025-01-08

## 2025-01-08 NOTE — TELEPHONE ENCOUNTER
Follow-up visit: 1/16/25  Last visit: 8/29/24  Last labs: 10/26/23 (TSH wnl but overdue)    Will send 30 day supply of levothyroxine 25 mcg daily until follow-up but patient is due for labs

## 2025-01-16 ENCOUNTER — LAB (OUTPATIENT)
Dept: LAB | Facility: LAB | Age: 85
End: 2025-01-16
Payer: MEDICARE

## 2025-01-16 ENCOUNTER — APPOINTMENT (OUTPATIENT)
Dept: PRIMARY CARE | Facility: CLINIC | Age: 85
End: 2025-01-16
Payer: MEDICARE

## 2025-01-16 ENCOUNTER — APPOINTMENT (OUTPATIENT)
Dept: LAB | Facility: LAB | Age: 85
End: 2025-01-16
Payer: MEDICARE

## 2025-01-16 VITALS
BODY MASS INDEX: 27.32 KG/M2 | DIASTOLIC BLOOD PRESSURE: 77 MMHG | HEIGHT: 65 IN | HEART RATE: 78 BPM | SYSTOLIC BLOOD PRESSURE: 140 MMHG | WEIGHT: 164 LBS | TEMPERATURE: 98 F | OXYGEN SATURATION: 97 %

## 2025-01-16 DIAGNOSIS — E66.3 OVERWEIGHT WITH BODY MASS INDEX (BMI) OF 27 TO 27.9 IN ADULT: ICD-10-CM

## 2025-01-16 DIAGNOSIS — E78.2 MIXED HYPERLIPIDEMIA: ICD-10-CM

## 2025-01-16 DIAGNOSIS — Z00.00 ANNUAL PHYSICAL EXAM: ICD-10-CM

## 2025-01-16 DIAGNOSIS — Z00.00 MEDICARE ANNUAL WELLNESS VISIT, SUBSEQUENT: Primary | ICD-10-CM

## 2025-01-16 DIAGNOSIS — Z00.00 ROUTINE GENERAL MEDICAL EXAMINATION AT HEALTH CARE FACILITY: ICD-10-CM

## 2025-01-16 DIAGNOSIS — E55.9 VITAMIN D DEFICIENCY: ICD-10-CM

## 2025-01-16 DIAGNOSIS — F41.9 ANXIETY DISORDER, UNSPECIFIED: ICD-10-CM

## 2025-01-16 DIAGNOSIS — E03.9 ADULT HYPOTHYROIDISM: ICD-10-CM

## 2025-01-16 DIAGNOSIS — C91.10 CLL (CHRONIC LYMPHOCYTIC LEUKEMIA) (MULTI): ICD-10-CM

## 2025-01-16 DIAGNOSIS — R41.3 MEMORY CHANGE: ICD-10-CM

## 2025-01-16 DIAGNOSIS — L30.9 DERMATITIS: ICD-10-CM

## 2025-01-16 LAB
25(OH)D3 SERPL-MCNC: 36 NG/ML (ref 30–100)
ALBUMIN SERPL BCP-MCNC: 4.6 G/DL (ref 3.4–5)
ALP SERPL-CCNC: 63 U/L (ref 33–136)
ALT SERPL W P-5'-P-CCNC: 19 U/L (ref 7–45)
ANION GAP SERPL CALC-SCNC: 14 MMOL/L (ref 10–20)
AST SERPL W P-5'-P-CCNC: 18 U/L (ref 9–39)
BILIRUB SERPL-MCNC: 0.5 MG/DL (ref 0–1.2)
BUN SERPL-MCNC: 18 MG/DL (ref 6–23)
CALCIUM SERPL-MCNC: 9.7 MG/DL (ref 8.6–10.6)
CHLORIDE SERPL-SCNC: 105 MMOL/L (ref 98–107)
CHOLEST SERPL-MCNC: 198 MG/DL (ref 0–199)
CHOLESTEROL/HDL RATIO: 3.2
CO2 SERPL-SCNC: 27 MMOL/L (ref 21–32)
CREAT SERPL-MCNC: 0.68 MG/DL (ref 0.5–1.05)
EGFRCR SERPLBLD CKD-EPI 2021: 85 ML/MIN/1.73M*2
GLUCOSE SERPL-MCNC: 104 MG/DL (ref 74–99)
HDLC SERPL-MCNC: 61.8 MG/DL
LDLC SERPL CALC-MCNC: 96 MG/DL
NON HDL CHOLESTEROL: 136 MG/DL (ref 0–149)
POTASSIUM SERPL-SCNC: 4 MMOL/L (ref 3.5–5.3)
PROT SERPL-MCNC: 6.6 G/DL (ref 6.4–8.2)
SODIUM SERPL-SCNC: 142 MMOL/L (ref 136–145)
TRIGL SERPL-MCNC: 202 MG/DL (ref 0–149)
TSH SERPL-ACNC: 3.28 MIU/L (ref 0.44–3.98)
VLDL: 40 MG/DL (ref 0–40)

## 2025-01-16 PROCEDURE — 1157F ADVNC CARE PLAN IN RCRD: CPT | Performed by: FAMILY MEDICINE

## 2025-01-16 PROCEDURE — 84443 ASSAY THYROID STIM HORMONE: CPT

## 2025-01-16 PROCEDURE — 1123F ACP DISCUSS/DSCN MKR DOCD: CPT | Performed by: FAMILY MEDICINE

## 2025-01-16 PROCEDURE — G0444 DEPRESSION SCREEN ANNUAL: HCPCS | Performed by: FAMILY MEDICINE

## 2025-01-16 PROCEDURE — 1159F MED LIST DOCD IN RCRD: CPT | Performed by: FAMILY MEDICINE

## 2025-01-16 PROCEDURE — 1170F FXNL STATUS ASSESSED: CPT | Performed by: FAMILY MEDICINE

## 2025-01-16 PROCEDURE — G0439 PPPS, SUBSEQ VISIT: HCPCS | Performed by: FAMILY MEDICINE

## 2025-01-16 PROCEDURE — 82306 VITAMIN D 25 HYDROXY: CPT

## 2025-01-16 PROCEDURE — 1126F AMNT PAIN NOTED NONE PRSNT: CPT | Performed by: FAMILY MEDICINE

## 2025-01-16 PROCEDURE — 80053 COMPREHEN METABOLIC PANEL: CPT

## 2025-01-16 PROCEDURE — 99214 OFFICE O/P EST MOD 30 MIN: CPT | Performed by: FAMILY MEDICINE

## 2025-01-16 PROCEDURE — 80061 LIPID PANEL: CPT

## 2025-01-16 RX ORDER — BUPROPION HYDROCHLORIDE 300 MG/1
300 TABLET ORAL DAILY
Qty: 90 TABLET | Refills: 1 | Status: SHIPPED | OUTPATIENT
Start: 2025-01-16

## 2025-01-16 RX ORDER — IVERMECTIN 3 MG/1
3 TABLET ORAL ONCE
COMMUNITY

## 2025-01-16 RX ORDER — HYDROXYZINE PAMOATE 25 MG/1
25 CAPSULE ORAL 3 TIMES DAILY PRN
Qty: 30 CAPSULE | Refills: 0 | Status: SHIPPED | OUTPATIENT
Start: 2025-01-16 | End: 2025-01-26

## 2025-01-16 ASSESSMENT — PATIENT HEALTH QUESTIONNAIRE - PHQ9
7. TROUBLE CONCENTRATING ON THINGS, SUCH AS READING THE NEWSPAPER OR WATCHING TELEVISION: SEVERAL DAYS
5. POOR APPETITE OR OVEREATING: NOT AT ALL
4. FEELING TIRED OR HAVING LITTLE ENERGY: SEVERAL DAYS
1. LITTLE INTEREST OR PLEASURE IN DOING THINGS: SEVERAL DAYS
2. FEELING DOWN, DEPRESSED OR HOPELESS: MORE THAN HALF THE DAYS
SUM OF ALL RESPONSES TO PHQ9 QUESTIONS 1 AND 2: 3
9. THOUGHTS THAT YOU WOULD BE BETTER OFF DEAD, OR OF HURTING YOURSELF: NOT AT ALL
6. FEELING BAD ABOUT YOURSELF - OR THAT YOU ARE A FAILURE OR HAVE LET YOURSELF OR YOUR FAMILY DOWN: SEVERAL DAYS
3. TROUBLE FALLING OR STAYING ASLEEP OR SLEEPING TOO MUCH: NOT AT ALL
SUM OF ALL RESPONSES TO PHQ QUESTIONS 1-9: 7
10. IF YOU CHECKED OFF ANY PROBLEMS, HOW DIFFICULT HAVE THESE PROBLEMS MADE IT FOR YOU TO DO YOUR WORK, TAKE CARE OF THINGS AT HOME, OR GET ALONG WITH OTHER PEOPLE: NOT DIFFICULT AT ALL
8. MOVING OR SPEAKING SO SLOWLY THAT OTHER PEOPLE COULD HAVE NOTICED. OR THE OPPOSITE, BEING SO FIGETY OR RESTLESS THAT YOU HAVE BEEN MOVING AROUND A LOT MORE THAN USUAL: SEVERAL DAYS

## 2025-01-16 ASSESSMENT — ACTIVITIES OF DAILY LIVING (ADL)
BATHING: INDEPENDENT
DOING_HOUSEWORK: INDEPENDENT
DRESSING: INDEPENDENT
MANAGING_FINANCES: INDEPENDENT
TAKING_MEDICATION: INDEPENDENT
GROCERY_SHOPPING: INDEPENDENT

## 2025-01-16 ASSESSMENT — ANXIETY QUESTIONNAIRES
GAD7 TOTAL SCORE: 7
3. WORRYING TOO MUCH ABOUT DIFFERENT THINGS: SEVERAL DAYS
6. BECOMING EASILY ANNOYED OR IRRITABLE: SEVERAL DAYS
IF YOU CHECKED OFF ANY PROBLEMS ON THIS QUESTIONNAIRE, HOW DIFFICULT HAVE THESE PROBLEMS MADE IT FOR YOU TO DO YOUR WORK, TAKE CARE OF THINGS AT HOME, OR GET ALONG WITH OTHER PEOPLE: SOMEWHAT DIFFICULT
5. BEING SO RESTLESS THAT IT IS HARD TO SIT STILL: SEVERAL DAYS
7. FEELING AFRAID AS IF SOMETHING AWFUL MIGHT HAPPEN: NOT AT ALL
4. TROUBLE RELAXING: SEVERAL DAYS
2. NOT BEING ABLE TO STOP OR CONTROL WORRYING: SEVERAL DAYS
1. FEELING NERVOUS, ANXIOUS, OR ON EDGE: MORE THAN HALF THE DAYS

## 2025-01-16 ASSESSMENT — ENCOUNTER SYMPTOMS
OCCASIONAL FEELINGS OF UNSTEADINESS: 0
LOSS OF SENSATION IN FEET: 0
DEPRESSION: 0

## 2025-01-16 ASSESSMENT — PAIN SCALES - GENERAL: PAINLEVEL_OUTOF10: 0-NO PAIN

## 2025-01-16 NOTE — ASSESSMENT & PLAN NOTE
Lifestyle Recommendations  We recommend a whole-food plant-based diet, an eating pattern that encourages the consumption of unrefined plant foods (such as fruits, vegetables, tubers, whole grains, legumes, nuts and seeds) and meat intake in moderation - emphasizing chicken, pork, fish.  Limiting or avoiding dairy products, eggs and processed foods.    The AHA/ACC recommends that the patient consume a dietary pattern that emphasizes intake of vegetables, fruits, and whole grains; includes low-fat dairy products, poultry, fish, legumes, non-tropical vegetable oils, and nuts; and limits intake of sodium, sweets, sugar-sweetened beverages, and red meats.  Adapt this dietary pattern to appropriate calorie requirements (a 500-750 kcal/day deficit to lose weight), personal and cultural food preferences, and nutrition therapy for other medical conditions (including diabetes).  Achieve this pattern by following plans such as the [place] Mediterranean, DASH dietary pattern, or AHA diet.    We also recommend goal of 150 minutes per week of active cardiovascular activity.

## 2025-01-16 NOTE — ASSESSMENT & PLAN NOTE
Stable - rechecking fasting labs and will adjust treatment plan accordingly.  Follow up at least annually.  Orders:    Comprehensive Metabolic Panel; Future    Lipid Panel; Future

## 2025-01-16 NOTE — PROGRESS NOTES
Subjective   Reason for Visit: Diana Azul is an 84 y.o. female here for a Medicare Wellness visit.     Past Medical, Surgical, and Family History reviewed and updated in chart.    Reviewed all medications by prescribing practitioner or clinical pharmacist (such as prescriptions, OTCs, herbal therapies and supplements) and documented in the medical record.    HPI    Accompanied by her daughter, Ivana, today.    RASH: dx with scabied by UC in December 2024 - she did have follow up with dermatology after topical treatment with Vermectin and seemed to have improved but she states it has not improved  Treated with topical treatment in October x 2 rounds without change in symptoms.  Had Triamcinolone for prn areas from dermatology but now with new areas and still itching    She did see UC and has rx for Ivermectin that she started this week and will repeat in 1 week    CLL: followed by Dr. Miguel  Plan is to follow up every 6 months, last visit 10/2024    SOC Hx: not   Tobacco Use: Low Risk  (1/16/2025)    Patient History     Smoking Tobacco Use: Never     Smokeless Tobacco Use: Never     Passive Exposure: Not on file     Alcohol Use: Not At Risk (7/15/2024)    AUDIT-C     Frequency of Alcohol Consumption: Monthly or less     Average Number of Drinks: 1 or 2     Frequency of Binge Drinking: Never     DIET:  regular    EXERCISE:  nothing regularly at this time    ELIMINATION: no constipation or diarrhea  COLON CA SCREENING: DISCONTINUE SCREENING DUE TO AGE RECOMMENDATION    URINARY SYSTEM: none    SLEEP:  minimally disturbed    MOODS:   Patient Health Questionnaire-9 Score: 7  ROJELIO-7 Total Score: 7     OB/GYN: LMP: No LMP recorded. Patient is postmenopausal.  Menstrual cycles - menopausal  MAMMO:  stopped due to age recommendations  DEXA: 11/15/22         Patient Care Team:  Marilee Yao DO as PCP - General (Family Medicine)  Marilee Yao DO as PCP - [employer] Advantage PCP  Rosanne M Casal,  "APRN-CNP, DNP as Nurse Practitioner (Hematology and Oncology)  OLIVER Simmons as  ()  Chris Miguel MD as Consulting Physician (Hematology and Oncology)     Review of Systems    Objective   Vitals:  /77 (BP Location: Left arm, Patient Position: Sitting, BP Cuff Size: Adult)   Pulse 78   Temp 36.7 °C (98 °F) (Temporal)   Ht 1.638 m (5' 4.5\")   Wt 74.4 kg (164 lb)   SpO2 97%   BMI 27.72 kg/m²       Physical Exam  Constitutional:       General: She is not in acute distress.     Appearance: Normal appearance.   HENT:      Head: Normocephalic and atraumatic.      Right Ear: Tympanic membrane, ear canal and external ear normal.      Left Ear: Tympanic membrane, ear canal and external ear normal.      Nose: Nose normal.      Mouth/Throat:      Mouth: Mucous membranes are moist.      Pharynx: No oropharyngeal exudate or posterior oropharyngeal erythema.   Eyes:      Extraocular Movements: Extraocular movements intact.      Conjunctiva/sclera: Conjunctivae normal.      Pupils: Pupils are equal, round, and reactive to light.   Cardiovascular:      Rate and Rhythm: Normal rate and regular rhythm.      Heart sounds: No murmur heard.  Pulmonary:      Effort: Pulmonary effort is normal.      Breath sounds: Normal breath sounds.   Abdominal:      General: Bowel sounds are normal.      Palpations: Abdomen is soft.   Musculoskeletal:         General: Normal range of motion.      Cervical back: No rigidity.   Lymphadenopathy:      Cervical: No cervical adenopathy.   Skin:     General: Skin is warm and dry.      Findings: No rash.      Comments: Scattered maculopapular patches along bilateral arms and abdomen.  Excoriated in multiple areas - no weeping or oozing.  Hands and webbing of fingers clear.   Neurological:      General: No focal deficit present.      Mental Status: She is alert and oriented to person, place, and time.      Cranial Nerves: No cranial nerve deficit.      " Gait: Gait normal.   Psychiatric:         Mood and Affect: Mood normal.         Behavior: Behavior normal.      Comments: Some anxiety with conversation         Assessment & Plan  Medicare annual wellness visit, subsequent    Orders:    1 Year Follow Up In [facility] - PCP - Wellness Exam; Future    Routine general medical examination at health care facility    Orders:    1 Year Follow Up In [facility] - PCP - Wellness Exam; Future    CLL (chronic lymphocytic leukemia) (Multi)  Stable - follow up with hematology as scheduled.       Adult hypothyroidism  Rechecking labs - will call with results and plan treatment accordingly.  Orders:    TSH with reflex to Free T4 if abnormal; Future    Overweight with body mass index (BMI) of 27 to 27.9 in adult  Lifestyle Recommendations  We recommend a whole-food plant-based diet, an eating pattern that encourages the consumption of unrefined plant foods (such as fruits, vegetables, tubers, whole grains, legumes, nuts and seeds) and meat intake in moderation - emphasizing chicken, pork, fish.  Limiting or avoiding dairy products, eggs and processed foods.    The AHA/ACC recommends that the patient consume a dietary pattern that emphasizes intake of vegetables, fruits, and whole grains; includes low-fat dairy products, poultry, fish, legumes, non-tropical vegetable oils, and nuts; and limits intake of sodium, sweets, sugar-sweetened beverages, and red meats.  Adapt this dietary pattern to appropriate calorie requirements (a 500-750 kcal/day deficit to lose weight), personal and cultural food preferences, and nutrition therapy for other medical conditions (including diabetes).  Achieve this pattern by following plans such as the [place] Mediterranean, DASH dietary pattern, or AHA diet.    We also recommend goal of 150 minutes per week of active cardiovascular activity.         Annual physical exam         Vitamin D deficiency    Orders:    Vitamin D  25-Hydroxy,Total (for eval of Vitamin D levels); Future    Mixed hyperlipidemia  Stable - rechecking fasting labs and will adjust treatment plan accordingly.  Follow up at least annually.  Orders:    Comprehensive Metabolic Panel; Future    Lipid Panel; Future    Anxiety disorder, unspecified  Sending referral to Behavioral Health Collaborative Care Team to resume enrollment in this team program.  Increased her Bupropion this visit.  Follow up pending re-evaluation by Behavioral Health Collaborative Care Team .  Orders:    buPROPion XL (Wellbutrin XL) 300 mg 24 hr tablet; Take 1 tablet (300 mg) by mouth once daily. Do not crush, chew, or split.    Memory change  Referring for full geriatric assessment with the [facility].  Follow up pending their recommendations.  Orders:    Referral to Geriatrics; Future    Dermatitis  Trial of Atarax for nighttime itch.  Finish course of Ivermectin s prescribed.  Will refer back to dermatology if not improving for possible biopsy of skin lesions.  Orders:    hydrOXYzine pamoate (VistariL) 25 mg capsule; Take 1 capsule (25 mg) by mouth 3 times a day as needed for itching for up to 10 days.           Depression Screening  5 - 10 minutes were spent screening for depression.    Medicare Wellness Billing Compliance Satisfied    *This is a visual tool to show completion of required items on the day of the visit. Green checks will only appear on the date of visit.    Review all medications by prescribing practitioner or clinical pharmacist (such as prescriptions, OTCs, herbal therapies and supplements) documented in the medical record    Past Medical, Surgical, and Family History reviewed and updated in chart    Tobacco Use Reviewed    Alcohol Use Reviewed    Illicit Drug Use Reviewed    PHQ2/9    Falls in Last Year Reviewed    Home Safety Risk Factors Reviewed    Cognitive Impairment Reviewed    Patient Self Assessment and Health Status    Current Diet Reviewed     Exercise Frequency    ADL - Hearing Impairment    ADL - Bathing    ADL - Dressing    ADL - Walks in Home    IADL - Managing Finances    IADL - Grocery Shopping    IADL - Taking Medications    IADL - Doing Housework

## 2025-01-16 NOTE — ASSESSMENT & PLAN NOTE
Rechecking labs - will call with results and plan treatment accordingly.  Orders:    TSH with reflex to Free T4 if abnormal; Future

## 2025-01-18 DIAGNOSIS — K21.9 GASTRO-ESOPHAGEAL REFLUX DISEASE WITHOUT ESOPHAGITIS: ICD-10-CM

## 2025-01-21 RX ORDER — OMEPRAZOLE 40 MG/1
40 CAPSULE, DELAYED RELEASE ORAL DAILY
Qty: 90 CAPSULE | Refills: 1 | Status: SHIPPED | OUTPATIENT
Start: 2025-01-21

## 2025-02-01 DIAGNOSIS — E03.9 ADULT HYPOTHYROIDISM: ICD-10-CM

## 2025-02-04 RX ORDER — LEVOTHYROXINE SODIUM 25 UG/1
25 TABLET ORAL DAILY
Qty: 90 TABLET | Refills: 1 | Status: SHIPPED | OUTPATIENT
Start: 2025-02-04

## 2025-02-05 NOTE — TELEPHONE ENCOUNTER
Follow-up visit: 3/11/25 for TG  Last visit: 1/16/25  Last labs: 1/16/25 (TSH 3.28 slightly increased)    Will send 6 month supply of levothyroxine 25mcg daily since thyroid levels are normal.

## 2025-02-24 ENCOUNTER — ANCILLARY PROCEDURE (OUTPATIENT)
Dept: URGENT CARE | Age: 85
End: 2025-02-24
Payer: MEDICARE

## 2025-02-24 ENCOUNTER — OFFICE VISIT (OUTPATIENT)
Dept: URGENT CARE | Age: 85
End: 2025-02-24
Payer: MEDICARE

## 2025-02-24 VITALS
SYSTOLIC BLOOD PRESSURE: 140 MMHG | RESPIRATION RATE: 18 BRPM | TEMPERATURE: 98.1 F | DIASTOLIC BLOOD PRESSURE: 65 MMHG | HEART RATE: 84 BPM | OXYGEN SATURATION: 100 %

## 2025-02-24 DIAGNOSIS — S20.212A CONTUSION OF RIBS, LEFT, INITIAL ENCOUNTER: ICD-10-CM

## 2025-02-24 DIAGNOSIS — R07.81 RIB PAIN ON LEFT SIDE: Primary | ICD-10-CM

## 2025-02-24 DIAGNOSIS — R07.81 RIB PAIN ON LEFT SIDE: ICD-10-CM

## 2025-02-24 PROCEDURE — 71101 X-RAY EXAM UNILAT RIBS/CHEST: CPT | Mod: LEFT SIDE | Performed by: NURSE PRACTITIONER

## 2025-02-24 ASSESSMENT — PAIN SCALES - GENERAL: PAINLEVEL_OUTOF10: 7

## 2025-02-24 ASSESSMENT — ENCOUNTER SYMPTOMS
OCCASIONAL FEELINGS OF UNSTEADINESS: 0
LOSS OF SENSATION IN FEET: 0
DEPRESSION: 0

## 2025-02-24 NOTE — PROGRESS NOTES
Subjective   Patient ID: Diana Azul is a 84 y.o. female. They present today with a chief complaint of L rib pain (C/O L rib pain post a fall on Saturday. No LOC. Pt does experience some pain when taking a deep breath.  ).    History of Present Illness  83 yo female coming in for left rib pain after falling on Saturday. She states she tripped over something while going to the bathroom in the hotel room. She denies any shoulder pain. She denies any shortness of breath but states it does hurt to take a deep breath. She denies any other complaints.     Past Medical History  Allergies as of 02/24/2025    (No Known Allergies)       (Not in a hospital admission)       Past Medical History:   Diagnosis Date    Anxiety     Compression fracture of T10 vertebra     Depression     Disorder of Eustachian tube, bilateral     GERD (gastroesophageal reflux disease)     Hiatal hernia     History of anxiety disorder 10/03/2013    HLD (hyperlipidemia)     Hypothyroid     Malignant melanoma of left shoulder     Memory deficit        History reviewed. No pertinent surgical history.     reports that she has never smoked. She has never used smokeless tobacco. She reports current alcohol use of about 1.0 standard drink of alcohol per week. She reports that she does not use drugs.    Review of Systems  Review of Systems:  General: No weight loss, fatigue, anorexia, insomnia, fever, chills.  Cardiac: No chest pain, palpitations, syncope, near syncope.  Pulmonary:  No shortness of breath, cough, hemoptysis  Heme/lymph: No swollen glands, fever, bleeding  Musculoskeletal: No limb pain, Positive left rib pain  Skin: No rashes  Neuro: No numbness, tingling, headaches                                 Objective    Vitals:    02/24/25 1658   BP: 140/65   BP Location: Left arm   Patient Position: Sitting   BP Cuff Size: Adult   Pulse: 84   Resp: 18   Temp: 36.7 °C (98.1 °F)   TempSrc: Oral   SpO2: 100%     No LMP recorded. Patient is  postmenopausal.    Physical Exam  Physical Exam:  General: Vital noted, no distress. Afebrile  Cardiac: Regular rate and rhythm, no murmur  Pulmonary: Lungs clear bilaterally with good aeration. No adventitious breath sounds.  Musculoskeletal: No peripheral edema. There is tenderness on palpation to the left mid-axillary and upper chest. No ecchymosis is noted. No hematoma is noted. No crepitus is noted on palpation.   Skin: No rashes      Procedures    Point of Care Test & Imaging Results from this visit  No results found for this visit on 02/24/25.   XR ribs 2 views left w chest pa or ap    Result Date: 2/24/2025  Interpreted By:  Adilene Monique, STUDY: XR RIBS 2 VIEWS LEFT WITH CHEST PA OR AP; ;  2/24/2025 5:43 pm   INDICATION: Signs/Symptoms:pain s/p fall.   ,R07.81 Pleurodynia   COMPARISON: Chest x-ray 12/08/2024.   ACCESSION NUMBER(S): [id number]   ORDERING CLINICIAN: JOSE COBIAN   FINDINGS: No radiographic evidence of a displaced rib fracture or other acute bony abnormality. Surgical clips overlie the left axilla/chest wall.   Cardiomediastinal silhouette is normal. Central hilar vasculature and interstitial lung markings are within normal limits. Previously seen subtle opacities in the periphery of the right mid lung are no longer visualized. Stable linear left basilar subsegmental atelectasis or scarring. No new focal area of consolidation or airspace opacity. No pleural effusion or pneumothorax.       No evidence of a displaced rib fracture or other acute abnormality.     MACRO: None   Signed by: Adilene Monique 2/24/2025 6:05 PM Dictation workstation:   [id number]     Diagnostic study results (if any) were reviewed by CHRIS Herrera.    Assessment/Plan   Allergies, medications, history, and pertinent labs/EKGs/Imaging reviewed by CHRIS Herrera.     Medical Decision Making  Testing: left rib XR  Treatment: Advised Tylenol and Motrin for pain. Encouraged deep breathing and coughing  to prevent pneumonia  Differential: 1) contusion of ribs , 2) rib fracture , 3) rib strain  Plan: Patient will follow up with the PCP in the next 2-3 days. Return for any worsening symptoms or go to the ER for further evaluation. Patient understands return precautions and discharge insturctions.  Impression:   1) left rib contusion      Orders and Diagnoses  Diagnoses and all orders for this visit:  Rib pain on left side  -     XR ribs 2 views left w chest pa or ap; Future  Contusion of ribs, left, initial encounter      Medical Admin Record      Patient disposition: Home    Electronically signed by CHRIS Herrera  6:13 PM

## 2025-03-02 DIAGNOSIS — K21.9 GASTROESOPHAGEAL REFLUX DISEASE WITHOUT ESOPHAGITIS: ICD-10-CM

## 2025-03-02 DIAGNOSIS — L50.9 URTICARIA: ICD-10-CM

## 2025-03-04 ENCOUNTER — LAB (OUTPATIENT)
Dept: LAB | Facility: HOSPITAL | Age: 85
End: 2025-03-04
Payer: MEDICARE

## 2025-03-04 DIAGNOSIS — D72.829 LEUKOCYTOSIS, UNSPECIFIED TYPE: ICD-10-CM

## 2025-03-04 DIAGNOSIS — D72.829 ELEVATED WHITE BLOOD CELL COUNT, UNSPECIFIED: Primary | ICD-10-CM

## 2025-03-04 LAB
BASOPHILS # BLD MANUAL: 0.19 X10*3/UL (ref 0–0.1)
BASOPHILS NFR BLD MANUAL: 0.8 %
EOSINOPHIL # BLD MANUAL: 0 X10*3/UL (ref 0–0.4)
EOSINOPHIL NFR BLD MANUAL: 0 %
ERYTHROCYTE [DISTWIDTH] IN BLOOD BY AUTOMATED COUNT: 13.8 % (ref 11.5–14.5)
HCT VFR BLD AUTO: 39.1 % (ref 36–46)
HGB BLD-MCNC: 12.3 G/DL (ref 12–16)
IMM GRANULOCYTES # BLD AUTO: 0.15 X10*3/UL (ref 0–0.5)
IMM GRANULOCYTES NFR BLD AUTO: 0.6 % (ref 0–0.9)
LYMPHOCYTES # BLD MANUAL: 13.73 X10*3/UL (ref 0.8–3)
LYMPHOCYTES NFR BLD MANUAL: 57.7 %
MCH RBC QN AUTO: 29.7 PG (ref 26–34)
MCHC RBC AUTO-ENTMCNC: 31.5 G/DL (ref 32–36)
MCV RBC AUTO: 94 FL (ref 80–100)
MONOCYTES # BLD MANUAL: 0.4 X10*3/UL (ref 0.05–0.8)
MONOCYTES NFR BLD MANUAL: 1.7 %
MYELOCYTES # BLD MANUAL: 0.19 X10*3/UL
MYELOCYTES NFR BLD MANUAL: 0.8 %
NEUTROPHILS # BLD MANUAL: 7.93 X10*3/UL (ref 1.6–5.5)
NEUTS BAND # BLD MANUAL: 0.19 X10*3/UL (ref 0–0.5)
NEUTS BAND NFR BLD MANUAL: 0.8 %
NEUTS SEG # BLD MANUAL: 7.74 X10*3/UL (ref 1.6–5)
NEUTS SEG NFR BLD MANUAL: 32.5 %
NRBC BLD-RTO: 0 /100 WBCS (ref 0–0)
PLATELET # BLD AUTO: 197 X10*3/UL (ref 150–450)
RBC # BLD AUTO: 4.14 X10*6/UL (ref 4–5.2)
RBC MORPH BLD: ABNORMAL
STOMATOCYTES BLD QL SMEAR: ABNORMAL
TOTAL CELLS COUNTED BLD: 123
VARIANT LYMPHS # BLD MANUAL: 1.36 X10*3/UL (ref 0–0.3)
VARIANT LYMPHS NFR BLD: 5.7 %
WBC # BLD AUTO: 23.8 X10*3/UL (ref 4.4–11.3)

## 2025-03-04 PROCEDURE — 85007 BL SMEAR W/DIFF WBC COUNT: CPT

## 2025-03-04 PROCEDURE — 85027 COMPLETE CBC AUTOMATED: CPT

## 2025-03-04 PROCEDURE — 36415 COLL VENOUS BLD VENIPUNCTURE: CPT

## 2025-03-04 RX ORDER — FAMOTIDINE 40 MG/1
40 TABLET, FILM COATED ORAL 2 TIMES DAILY
Qty: 180 TABLET | Refills: 1 | OUTPATIENT
Start: 2025-03-04

## 2025-03-04 RX ORDER — TRIAMCINOLONE ACETONIDE 1 MG/G
CREAM TOPICAL
Qty: 30 G | Refills: 0 | Status: SHIPPED | OUTPATIENT
Start: 2025-03-04

## 2025-03-11 ENCOUNTER — APPOINTMENT (OUTPATIENT)
Dept: PRIMARY CARE | Facility: CLINIC | Age: 85
End: 2025-03-11
Payer: MEDICARE

## 2025-03-11 VITALS
SYSTOLIC BLOOD PRESSURE: 120 MMHG | OXYGEN SATURATION: 98 % | WEIGHT: 164 LBS | HEART RATE: 83 BPM | BODY MASS INDEX: 27.72 KG/M2 | DIASTOLIC BLOOD PRESSURE: 60 MMHG

## 2025-03-11 DIAGNOSIS — F41.9 ANXIETY DISORDER, UNSPECIFIED: ICD-10-CM

## 2025-03-11 DIAGNOSIS — L50.9 URTICARIA: ICD-10-CM

## 2025-03-11 DIAGNOSIS — E78.2 MIXED HYPERLIPIDEMIA: ICD-10-CM

## 2025-03-11 DIAGNOSIS — E03.9 ADULT HYPOTHYROIDISM: Primary | ICD-10-CM

## 2025-03-11 DIAGNOSIS — F41.9 ANXIETY: ICD-10-CM

## 2025-03-11 DIAGNOSIS — K21.9 GASTROESOPHAGEAL REFLUX DISEASE WITHOUT ESOPHAGITIS: ICD-10-CM

## 2025-03-11 PROCEDURE — 99214 OFFICE O/P EST MOD 30 MIN: CPT | Performed by: FAMILY MEDICINE

## 2025-03-11 PROCEDURE — 1160F RVW MEDS BY RX/DR IN RCRD: CPT | Performed by: FAMILY MEDICINE

## 2025-03-11 PROCEDURE — G2211 COMPLEX E/M VISIT ADD ON: HCPCS | Performed by: FAMILY MEDICINE

## 2025-03-11 PROCEDURE — 1123F ACP DISCUSS/DSCN MKR DOCD: CPT | Performed by: FAMILY MEDICINE

## 2025-03-11 PROCEDURE — 1159F MED LIST DOCD IN RCRD: CPT | Performed by: FAMILY MEDICINE

## 2025-03-11 PROCEDURE — 1157F ADVNC CARE PLAN IN RCRD: CPT | Performed by: FAMILY MEDICINE

## 2025-03-11 RX ORDER — BUPROPION HYDROCHLORIDE 300 MG/1
300 TABLET ORAL DAILY
Qty: 90 TABLET | Refills: 1 | Status: SHIPPED | OUTPATIENT
Start: 2025-03-11

## 2025-03-11 RX ORDER — PROPRANOLOL HYDROCHLORIDE 10 MG/1
10 TABLET ORAL 2 TIMES DAILY PRN
Qty: 180 TABLET | Refills: 0 | Status: SHIPPED | OUTPATIENT
Start: 2025-03-11

## 2025-03-11 RX ORDER — SIMVASTATIN 40 MG/1
40 TABLET, FILM COATED ORAL NIGHTLY
Qty: 90 TABLET | Refills: 1 | Status: SHIPPED | OUTPATIENT
Start: 2025-03-11

## 2025-03-11 RX ORDER — PROPRANOLOL HYDROCHLORIDE 10 MG/1
10 TABLET ORAL 2 TIMES DAILY PRN
Qty: 90 TABLET | Refills: 0 | Status: SHIPPED | OUTPATIENT
Start: 2025-03-11 | End: 2025-03-11

## 2025-03-11 RX ORDER — TRIAMCINOLONE ACETONIDE 1 MG/G
CREAM TOPICAL 2 TIMES DAILY
Qty: 30 G | Refills: 0 | Status: SHIPPED | OUTPATIENT
Start: 2025-03-11

## 2025-03-11 RX ORDER — CITALOPRAM 20 MG/1
20 TABLET, FILM COATED ORAL DAILY
Qty: 90 TABLET | Refills: 1 | Status: SHIPPED | OUTPATIENT
Start: 2025-03-11

## 2025-03-11 ASSESSMENT — PATIENT HEALTH QUESTIONNAIRE - PHQ9
5. POOR APPETITE OR OVEREATING: NOT AT ALL
10. IF YOU CHECKED OFF ANY PROBLEMS, HOW DIFFICULT HAVE THESE PROBLEMS MADE IT FOR YOU TO DO YOUR WORK, TAKE CARE OF THINGS AT HOME, OR GET ALONG WITH OTHER PEOPLE: SOMEWHAT DIFFICULT
7. TROUBLE CONCENTRATING ON THINGS, SUCH AS READING THE NEWSPAPER OR WATCHING TELEVISION: SEVERAL DAYS
SUM OF ALL RESPONSES TO PHQ9 QUESTIONS 1 AND 2: 0
1. LITTLE INTEREST OR PLEASURE IN DOING THINGS: NOT AT ALL
3. TROUBLE FALLING OR STAYING ASLEEP OR SLEEPING TOO MUCH: NOT AT ALL
6. FEELING BAD ABOUT YOURSELF - OR THAT YOU ARE A FAILURE OR HAVE LET YOURSELF OR YOUR FAMILY DOWN: SEVERAL DAYS
9. THOUGHTS THAT YOU WOULD BE BETTER OFF DEAD, OR OF HURTING YOURSELF: NOT AT ALL
4. FEELING TIRED OR HAVING LITTLE ENERGY: SEVERAL DAYS
SUM OF ALL RESPONSES TO PHQ QUESTIONS 1-9: 3
8. MOVING OR SPEAKING SO SLOWLY THAT OTHER PEOPLE COULD HAVE NOTICED. OR THE OPPOSITE, BEING SO FIGETY OR RESTLESS THAT YOU HAVE BEEN MOVING AROUND A LOT MORE THAN USUAL: NOT AT ALL
2. FEELING DOWN, DEPRESSED OR HOPELESS: NOT AT ALL

## 2025-03-11 ASSESSMENT — ANXIETY QUESTIONNAIRES
5. BEING SO RESTLESS THAT IT IS HARD TO SIT STILL: NOT AT ALL
3. WORRYING TOO MUCH ABOUT DIFFERENT THINGS: SEVERAL DAYS
1. FEELING NERVOUS, ANXIOUS, OR ON EDGE: MORE THAN HALF THE DAYS
2. NOT BEING ABLE TO STOP OR CONTROL WORRYING: SEVERAL DAYS
4. TROUBLE RELAXING: NOT AT ALL
IF YOU CHECKED OFF ANY PROBLEMS ON THIS QUESTIONNAIRE, HOW DIFFICULT HAVE THESE PROBLEMS MADE IT FOR YOU TO DO YOUR WORK, TAKE CARE OF THINGS AT HOME, OR GET ALONG WITH OTHER PEOPLE: SOMEWHAT DIFFICULT
6. BECOMING EASILY ANNOYED OR IRRITABLE: SEVERAL DAYS
7. FEELING AFRAID AS IF SOMETHING AWFUL MIGHT HAPPEN: SEVERAL DAYS
GAD7 TOTAL SCORE: 6

## 2025-03-11 NOTE — PROGRESS NOTES
Subjective   Patient ID: Diana Azul is a 84 y.o. female who presents for Follow-up.    HPI    HYPOTHYROID: feeling well on current regimen.  Lab Results   Component Value Date    TSH 3.28 01/16/2025     ANXIETY: has been out of her Citalopram for several weeks  Did not refill but feeling like she still needs it    Had to change appointment with Chrissy (from our Behavioral Health Collaborative Care Team) when she was sick and would like to reschedule    She does not want to see geriatric specialist    FALL: fell while at her brother's wedding last month  She was seen at  but no broken ribs and healing well    Walking more at the rec center more regularly    Cutting back on her wine at night- usually only at special occasions    Review of Systems    Review of Systems negative except as noted in HPI and Chief complaint.     Objective   Patient Health Questionnaire-9 Score: 3  ROJELIO-7 Total Score: 6     VITALS:  /60 (BP Location: Left arm, Patient Position: Sitting, BP Cuff Size: Adult)   Pulse 83   Wt 74.4 kg (164 lb)   SpO2 98%   BMI 27.72 kg/m²      Physical Exam  Constitutional:       General: She is not in acute distress.     Appearance: Normal appearance. She is not ill-appearing.   HENT:      Head: Normocephalic and atraumatic.   Neck:      Vascular: No carotid bruit.   Cardiovascular:      Rate and Rhythm: Normal rate and regular rhythm.      Pulses: Normal pulses.      Heart sounds: Normal heart sounds. No murmur heard.     No gallop.   Pulmonary:      Effort: Pulmonary effort is normal.      Breath sounds: Normal breath sounds. No wheezing, rhonchi or rales.   Musculoskeletal:      Cervical back: Normal range of motion and neck supple. No rigidity or tenderness.   Lymphadenopathy:      Cervical: No cervical adenopathy.   Skin:     General: Skin is warm and dry.   Neurological:      Mental Status: She is alert.   Psychiatric:         Mood and Affect: Mood normal.         Behavior: Behavior  normal.       Assessment/Plan   Assessment & Plan  Anxiety disorder, unspecified  Resuming Citalopram.  Setting up follow up with Behavioral Health Collaborative Care Team   Follow-up  1 MONTH   Orders:    citalopram (CeleXA) 20 mg tablet; Take 1 tablet (20 mg) by mouth once daily.    buPROPion XL (Wellbutrin XL) 300 mg 24 hr tablet; Take 1 tablet (300 mg) by mouth once daily. Do not crush, chew, or split.    Anxiety    Orders:    Follow Up In [facility] - Behavioral Health Collaborative Care [facility]; Future    Mixed hyperlipidemia  Review of recent LDL with fair control.  Continue with Simvastatin at current dosage.  Follow-up  6 MONTHS   Orders:    simvastatin (Zocor) 40 mg tablet; Take 1 tablet (40 mg) by mouth once daily at bedtime.    Urticaria  Refilling topical steroid cream.  May continue with OTC Allegra during the day and Benadryl at night.  Call if not improving.  Orders:    triamcinolone (Kenalog) 0.1 % cream; Apply topically 2 times a day.    FOLLOW UP 1 MONTH, SOONER WITH ANY PROBLEMS OR CONCERNS.     Patient was identified as a fall risk. Risk prevention instructions provided.

## 2025-03-11 NOTE — TELEPHONE ENCOUNTER
Follow-up visit: none yet  Last visit: 3/11/25  Last vitals: 3/11/25  Depression screening: 3/11/25    Per 10/10/24, pt was taking propranolol in the morning to get through. She was previously noted to be taking 1-2 times daily prn. Rx likely was accidentally sent to [facility] pharmacy Take 1 tablet (10 mg) by mouth 2 times a day as needed (anxiety). TAKE 1 TABLET BY MOUTH EVERY DAY AS NEEDED by mistake. Re-sent Rx for 1-2 times daily prn based on past instructions

## 2025-03-11 NOTE — ASSESSMENT & PLAN NOTE
Review of recent LDL with fair control.  Continue with Simvastatin at current dosage.  Follow-up  6 MONTHS   Orders:    simvastatin (Zocor) 40 mg tablet; Take 1 tablet (40 mg) by mouth once daily at bedtime.

## 2025-03-11 NOTE — PATIENT INSTRUCTIONS

## 2025-03-21 ENCOUNTER — TELEPHONE (OUTPATIENT)
Dept: PRIMARY CARE | Facility: CLINIC | Age: 85
End: 2025-03-21
Payer: MEDICARE

## 2025-03-21 DIAGNOSIS — K21.9 GASTROESOPHAGEAL REFLUX DISEASE WITHOUT ESOPHAGITIS: Primary | ICD-10-CM

## 2025-03-21 RX ORDER — FAMOTIDINE 20 MG/1
20 TABLET, FILM COATED ORAL 2 TIMES DAILY
Qty: 60 TABLET | Refills: 5 | Status: SHIPPED | OUTPATIENT
Start: 2025-03-21 | End: 2025-09-17

## 2025-03-21 NOTE — TELEPHONE ENCOUNTER
Patient called in leaving a VM that she is wanting to go ahead and the famotidine, I only see the omeprazole in her chart, were you going to have to try it?

## 2025-03-24 ENCOUNTER — APPOINTMENT (OUTPATIENT)
Dept: PRIMARY CARE | Facility: CLINIC | Age: 85
End: 2025-03-24
Payer: MEDICARE

## 2025-03-24 DIAGNOSIS — F41.9 ANXIETY: ICD-10-CM

## 2025-03-24 ASSESSMENT — PATIENT HEALTH QUESTIONNAIRE - PHQ9
7. TROUBLE CONCENTRATING ON THINGS, SUCH AS READING THE NEWSPAPER OR WATCHING TELEVISION: SEVERAL DAYS
8. MOVING OR SPEAKING SO SLOWLY THAT OTHER PEOPLE COULD HAVE NOTICED. OR THE OPPOSITE, BEING SO FIGETY OR RESTLESS THAT YOU HAVE BEEN MOVING AROUND A LOT MORE THAN USUAL: NOT AT ALL
9. THOUGHTS THAT YOU WOULD BE BETTER OFF DEAD, OR OF HURTING YOURSELF: NOT AT ALL
6. FEELING BAD ABOUT YOURSELF - OR THAT YOU ARE A FAILURE OR HAVE LET YOURSELF OR YOUR FAMILY DOWN: SEVERAL DAYS
4. FEELING TIRED OR HAVING LITTLE ENERGY: SEVERAL DAYS
5. POOR APPETITE OR OVEREATING: NOT AT ALL
3. TROUBLE FALLING OR STAYING ASLEEP: NOT AT ALL
SUM OF ALL RESPONSES TO PHQ QUESTIONS 1-9: 7
2. FEELING DOWN, DEPRESSED OR HOPELESS: MORE THAN HALF THE DAYS
1. LITTLE INTEREST OR PLEASURE IN DOING THINGS: MORE THAN HALF THE DAYS
10. IF YOU CHECKED OFF ANY PROBLEMS, HOW DIFFICULT HAVE THESE PROBLEMS MADE IT FOR YOU TO DO YOUR WORK, TAKE CARE OF THINGS AT HOME, OR GET ALONG WITH OTHER PEOPLE: SOMEWHAT DIFFICULT
SUM OF ALL RESPONSES TO PHQ9 QUESTIONS 1 & 2: 4

## 2025-03-24 ASSESSMENT — ANXIETY QUESTIONNAIRES
7. FEELING AFRAID AS IF SOMETHING AWFUL MIGHT HAPPEN: NOT AT ALL
3. WORRYING TOO MUCH ABOUT DIFFERENT THINGS: SEVERAL DAYS
IF YOU CHECKED OFF ANY PROBLEMS ON THIS QUESTIONNAIRE, HOW DIFFICULT HAVE THESE PROBLEMS MADE IT FOR YOU TO DO YOUR WORK, TAKE CARE OF THINGS AT HOME, OR GET ALONG WITH OTHER PEOPLE: SOMEWHAT DIFFICULT
2. NOT BEING ABLE TO STOP OR CONTROL WORRYING: SEVERAL DAYS
1. FEELING NERVOUS, ANXIOUS, OR ON EDGE: SEVERAL DAYS
4. TROUBLE RELAXING: SEVERAL DAYS
6. BECOMING EASILY ANNOYED OR IRRITABLE: NOT AT ALL
GAD7 TOTAL SCORE: 4
5. BEING SO RESTLESS THAT IT IS HARD TO SIT STILL: NOT AT ALL

## 2025-03-24 NOTE — PROGRESS NOTES
Collaborative Care ([facility]) Initial Assessment  Appointment Time  Start: 2:05 PM  End: 3:01 PM     Collaborative Care program information (including case discussion with psychiatry, involvement of [facility] and billing when applicable) was provided and discussed with the patient. Patient Indicated understanding and agreed to proceed.   Confirm: Yes    Patient Health Questionnaire-9 Score: 7 (3/24/2025  3:49 PM)  ROJELIO-7 Total Score: 4 (3/24/2025  2:05 PM)      Reason for Visit / Chief Complaint  Chief Complaint   Patient presents with    [facility] Re-Admit     Referring Provider: Marilee Yao DO    Brief Summary: Diana Azul is presenting to appointment seeking re-evaluation from the [facility] program for concerns of anxiety and forgetfulness.  History provided by patient and daughter and accompanied to appointment by daughter.    Review of Symptoms  Mood   December/January, noticed more struggles with mood, possibly correlated with patient's not taking medications correctly, having her identity stolen, and had an undiagnosed skin rash after staying in a hotel which bothered her.  Both daughter and patient report she has been more positive lately, has been exercising, and getting out of her home.    Anxiety   Recent anxiety correlates with situational stressors of having her identity stolen and not taking medications as prescribed.  Also has been dealing with a skin rash of uncertain etiology.       Other Psychiatric Review Of Systems:  Manic Symptoms: denied  Obsessive/Compulsive Symptoms: denied  Attention Deficit/Hyperactivity Symptoms: denied    Learning Concerns & Sx: denied  Memory Concerns & Sx: recently has been more confused and memory concerns, though it may be correlated with situational stressors  Hallucinations & Delusions Experienced: n/a  Anger/Irritability sx: no concerns   Appetite Sx:    Concerns with appetite: no concerns at this time; was doing a subscription diet with meals and found it easier to plan  meals; has been meal prepping with daughter  Sleep Sx:    Concerns with sleep: N/A    Comprehensive Behavioral Health History   Associated Medical Concerns   Potential Associated Factors: See chart    Medications  Current Mental Health Medications:   Celexa 20mg 1x/day  Buproprion XL 300mg 1x/day  Propranolol 10mg 2x/day as needed    Past Mental Health Medications:   Alprazolam  sertraline    Concerns / challenges / barriers with taking medications? Patient has a history of not taking medications (psych and health) correctly    Open to medication recommendations from consulting psychiatrist? Yes    Mental Health Treatment History  Current Mental Health Treatment: N/A; strongly advising patient to be evaluated by [facility] to get baseline of cognitive abilities  Previous Mental Health Treatment: [facility]    Substance Use/Treamtent History  Social History     Substance and Sexual Activity   Alcohol Use Yes    Alcohol/week: 1.0 standard drink of alcohol    Types: 1 Glasses of wine per week     Social History     Substance and Sexual Activity   Drug Use Never       Use of Drugs: denies  Use of Alcohol: reports having cut down to only occasionally drinking a glass of wine     Self-Esteem/Self-Image/Self-Expression  Lately has been feeling better about herself and abilities; has been going to "[employer] to walk the track, out with daughter, and to grandson's sports games at [employer]    Functional impairment   Taking medications    Trauma    No new concerns    Abuse History  No new concerns    Grief / Loss / Adjustment   No new concerns    Risk History  Suicidal Thoughts/Attempts:  Suicidal Thoughts/Method/Intent/Plan/Preparations: denies   Number of Suicide Attempts: 0  Access to Firearms/Lethal Means: No guns in home  Non-Suicidal Self-injurious behavior/risky behavior: None, denied    Suicide Risk Assessment:   Patient Assessed for risk of suicide: Yes  Last [place] Risk Score: No Risk  Protective  Factors: Family    Homicidal Thoughts/Attempts:  Homicidal Thoughts/Method/Plan/Intent: denies    Social History  Housing   Living Situation: Lives by herself in [facility] in [place]  Safe Housing Conditions / Feels Safe in Home: Yes    Employment  Current Employment: Retired  Current Concerns/Challenges: N/A    Income   Financial Stability: No changes since initial evaluation in 2023    Legal   Legal Considerations: None, denied    Relationships   Relationships with her family remain strong; patient sees one daughter often, other children as their schedules permit. Has been going to watch grandson's baseball and has been to some family weddings.    Family History of Mental Health:  Mental Health / Conditions  No new noted concerns    Substance Use  No new noted concerns    Family History of Suicide  No new noted concerns    Psychosocial Stressors:  Psychosocial Stressors: some financial.    Supports:   Supports: Family    Coping Skills:   Coping:  walking, refaming       N/A    Sexuality / Gender   No Concerns    Transportation   Transportation Concerns: Drives; knows limits of where she is comfortable driving    Orthodoxy/ Spirituality   Are you Judaism or Spiritual: Yes    Mental Status Evaluation:  Appearance: age appropriate and casually dressed  Behavior: normal  Speech: normal pitch and normal volume  Mood: normal  Affect: normal  Thought Process: circumstantial  Thought Content: normal  Sensorium: person, place, and time/date  Insight: When baseline, able to understand MH symptoms; when anxious, struggles to bring self back to baseline in regards to insight  Judgment: intact    Assessment Summary  / Plan  Goals:  Patient Goals for Collaborative Care: Check in, get back on track with activities, symptom monitoring  Strongly suggested evaluation by [facility]    Plan:   Psych consult - ongoing, set meeting frequency, once a month, provide psycho-education, provide appropriate tx referrals,  and provide appropriate resources    Provisional Findings / Impressions  Primary: Depression Unspecified    Secondary: Cognitive disorder unspecified    Follow Up / Next Appointment: Next appointment: 04/08/25

## 2025-03-26 ENCOUNTER — APPOINTMENT (OUTPATIENT)
Dept: PRIMARY CARE | Facility: CLINIC | Age: 85
End: 2025-03-26
Payer: MEDICARE

## 2025-03-31 ENCOUNTER — DOCUMENTATION (OUTPATIENT)
Dept: PRIMARY CARE | Facility: CLINIC | Age: 85
End: 2025-03-31

## 2025-03-31 ENCOUNTER — DOCUMENTATION (OUTPATIENT)
Dept: PRIMARY CARE | Facility: CLINIC | Age: 85
End: 2025-03-31
Payer: MEDICARE

## 2025-03-31 DIAGNOSIS — F43.23 SITUATIONAL MIXED ANXIETY AND DEPRESSIVE DISORDER: Primary | ICD-10-CM

## 2025-03-31 PROCEDURE — 99492 1ST PSYC COLLAB CARE MGMT: CPT | Performed by: FAMILY MEDICINE

## 2025-03-31 NOTE — PROGRESS NOTES
[facility] Psychiatry Consult Note     Diana Azul is a 84 y.o., referred to Collaborative Care for symptoms of depression and anxiety. I have reviewed the patient with the behavioral health manager and reviewed the patient's electronic record.    Recommendations: ***        Patient Health Questionnaire-9 Score: 7 (3/24/2025  3:49 PM)  ROJELIO-7 Total Score: 4 (3/24/2025  2:05 PM)      The above treatment considerations and suggestions are based on consultations with the patient's care manager and a review of information available in the electronic medical record. I have not personally examined the patient. All recommendations should be implemented with consideration of the patient's relevant prior history and current clinical status. Please feel free to call me with any questions about the care of this patient. I can easily be reached through [facility], or at : ***

## 2025-04-02 ENCOUNTER — DOCUMENTATION (OUTPATIENT)
Dept: BEHAVIORAL HEALTH | Facility: HOSPITAL | Age: 85
End: 2025-04-02
Payer: MEDICARE

## 2025-04-02 NOTE — PROGRESS NOTES
[facility] Psychiatry Consult Note     Diana Azul is a 84 y.o., referred to Collaborative Care for symptoms of depression and anxiety. I have reviewed the patient with the behavioral health manager and reviewed the patient's electronic record on 3/27/2025.    Brief summary: Patient is an 84-year-old female referred to collaborative care regarding cognitive decline.  Patient initially discussed in 2023 with recommendations to increase bupropion and continue Celexa/citalopram at 20 mg p.o. daily.  Currently patient is stable on medications.  She lives on her own.  Has 5 children who are supportive and local.  She reports that her daughter takes her to appointments although patient does still drive.  Patient is able to grocery shop on her own.  No acute safety concerns; patient has interactions with grand children.  Need for baseline cognitive status.      Recommendations: Cognitive disorder unspecified, depression unspecified, anxiety unspecified.  Recommend neuropsychological testing regarding baseline cognitive function.  Continue citalopram and bupropion as prescribed.  Continue to engage family support.  Behavioral health manager to continue to monitor symptomology and offer support as needed.        Patient Health Questionnaire-9 Score: 7 (3/24/2025  3:49 PM)  ROJELIO-7 Total Score: 4 (3/24/2025  2:05 PM)  Low PHQ-9 and ROJELIO-7 scores may be indicative of subthreshold or subclinical diagnosis of anxiety or depression disorder.  Likewise low scores can indicate partial remission or remission of symptoms.  Lastly low scores can indicate underreporting by patient.    The above treatment considerations and suggestions are based on consultations with the patient's care manager and a review of information available in the electronic medical record. I have not personally examined the patient. All recommendations should be implemented with consideration of the patient's relevant prior history and current clinical status.  Please feel free to call me with any questions about the care of this patient.

## 2025-04-03 DIAGNOSIS — F41.9 ANXIETY: ICD-10-CM

## 2025-04-07 RX ORDER — PROPRANOLOL HYDROCHLORIDE 10 MG/1
10 TABLET ORAL 2 TIMES DAILY PRN
Qty: 60 TABLET | Refills: 0 | Status: SHIPPED | OUTPATIENT
Start: 2025-04-07

## 2025-04-08 ENCOUNTER — APPOINTMENT (OUTPATIENT)
Dept: PRIMARY CARE | Facility: CLINIC | Age: 85
End: 2025-04-08
Payer: MEDICARE

## 2025-04-08 DIAGNOSIS — H69.83 OTHER SPECIFIED DISORDERS OF EUSTACHIAN TUBE, BILATERAL: ICD-10-CM

## 2025-04-08 ASSESSMENT — ANXIETY QUESTIONNAIRES
2. NOT BEING ABLE TO STOP OR CONTROL WORRYING: NOT AT ALL
IF YOU CHECKED OFF ANY PROBLEMS ON THIS QUESTIONNAIRE, HOW DIFFICULT HAVE THESE PROBLEMS MADE IT FOR YOU TO DO YOUR WORK, TAKE CARE OF THINGS AT HOME, OR GET ALONG WITH OTHER PEOPLE: SOMEWHAT DIFFICULT
3. WORRYING TOO MUCH ABOUT DIFFERENT THINGS: SEVERAL DAYS
7. FEELING AFRAID AS IF SOMETHING AWFUL MIGHT HAPPEN: NOT AT ALL
4. TROUBLE RELAXING: NOT AT ALL
5. BEING SO RESTLESS THAT IT IS HARD TO SIT STILL: SEVERAL DAYS
1. FEELING NERVOUS, ANXIOUS, OR ON EDGE: SEVERAL DAYS
GAD7 TOTAL SCORE: 3
6. BECOMING EASILY ANNOYED OR IRRITABLE: NOT AT ALL

## 2025-04-08 ASSESSMENT — PATIENT HEALTH QUESTIONNAIRE - PHQ9
5. POOR APPETITE OR OVEREATING: SEVERAL DAYS
SUM OF ALL RESPONSES TO PHQ9 QUESTIONS 1 & 2: 2
10. IF YOU CHECKED OFF ANY PROBLEMS, HOW DIFFICULT HAVE THESE PROBLEMS MADE IT FOR YOU TO DO YOUR WORK, TAKE CARE OF THINGS AT HOME, OR GET ALONG WITH OTHER PEOPLE: NOT DIFFICULT AT ALL
6. FEELING BAD ABOUT YOURSELF - OR THAT YOU ARE A FAILURE OR HAVE LET YOURSELF OR YOUR FAMILY DOWN: SEVERAL DAYS
8. MOVING OR SPEAKING SO SLOWLY THAT OTHER PEOPLE COULD HAVE NOTICED. OR THE OPPOSITE, BEING SO FIGETY OR RESTLESS THAT YOU HAVE BEEN MOVING AROUND A LOT MORE THAN USUAL: NOT AT ALL
3. TROUBLE FALLING OR STAYING ASLEEP: NOT AT ALL
2. FEELING DOWN, DEPRESSED OR HOPELESS: SEVERAL DAYS
SUM OF ALL RESPONSES TO PHQ QUESTIONS 1-9: 6
1. LITTLE INTEREST OR PLEASURE IN DOING THINGS: SEVERAL DAYS
4. FEELING TIRED OR HAVING LITTLE ENERGY: SEVERAL DAYS
9. THOUGHTS THAT YOU WOULD BE BETTER OFF DEAD, OR OF HURTING YOURSELF: NOT AT ALL
7. TROUBLE CONCENTRATING ON THINGS, SUCH AS READING THE NEWSPAPER OR WATCHING TELEVISION: SEVERAL DAYS

## 2025-04-08 NOTE — TELEPHONE ENCOUNTER
Follow-up visit: none   - sees social work  Last visit: 3/11/25 rtc in 1 month  Last vitals: 3/11/25 (/60, HR 83)  Anxiety screening: 3/11/25 PCP and 4/2/25 behavioral health    Refill request from [facility] pharmacy for the following:   - propranolol 10mg BID prn  Approved: 60 tabs for 30 days  Pt needs to schedule her 1 month follow-up for further refills

## 2025-04-08 NOTE — PROGRESS NOTES
Collaborative Care ([facility])  Progress Note    Type of Interaction: In Office    Start Time: 2:00 PM    End Time: 2:43 PM    Appointment: Scheduled    Reason for Visit:   Chief Complaint   Patient presents with    Follow-up     Interval History / Patient Symptoms:   Celena Azul is a 84 y.o. female currently enrolled in the [facility] program for symptom monitoring, brief therapy, and support. Patient presents today for follow up for Collaborative Care services.     Metrics:  Patient Health Questionnaire-9 Score: 6 (4/8/2025  2:04 PM)  ROJELIO-7 Total Score: 3 (4/8/2025  2:04 PM)    Over the past 2 weeks, how often have you been bothered by any of the following problems?  Little interest or pleasure in doing things: Several days  Feeling down, depressed, or hopeless: Several days  Trouble falling or staying asleep, or sleeping too much: Not at all  Feeling tired or having little energy: Several days  Poor appetite or overeating: Several days  Feeling bad about yourself - or that you are a failure or have let yourself or your family down: Several days  Trouble concentrating on things, such as reading the newspaper or watching television: Several days  Moving or speaking so slowly that other people could have noticed? Or the opposite - being so fidgety or restless that you have been moving around a lot more than usual.: Not at all  Thoughts that you would be better off dead or hurting yourself in some way: Not at all  Patient Health Questionnaire-9 Score: 6     ROJELIO-7  Feeling Nervous, Anxious, or on Edge: Several days  Not Being Able to Stop or Control Worrying: Not at all  Worrying too Much About Different Things: Several days  Trouble Relaxing: Not at all  Being so Restless That it is Hard to Sit Still: Several days  Becoming Easily Annoyed or Irritable: Not at all  Feeling Afraid as if Something Awful Might Happen: Not at all  ROJELIO-7 Total Score: 3  If you checked off any problems, how difficult have these problems made it  for you to do your work, take care of things at home, or get along with other people?: Somewhat difficult     Interventions Provided:   Psychoeducation/Patient education and Cognitive Behavioral Therapy (CBT)    Progress Made:   As Expected    Response to Intervention:  We discussed the following elements during appointment:   Pt overall doing well; states that her anxiety currently is due to trying to find a dress for an upcoming family wedding and has a hem/onc appointment coming up in May and is trying to schedule a [facility] appointment/assessment.  Discussed what the [facility] evaluation would look like and answered questions.    MEDICATION: stable on current medications    Patient was engaged, responsive, and interactive.     Plan:   Follow up in 3 weeks; continue to try to schedule [facility] assessment    Follow Up / Next Appointment: Next appointment: 04/29/25

## 2025-04-10 RX ORDER — FLUTICASONE PROPIONATE 50 MCG
2 SPRAY, SUSPENSION (ML) NASAL DAILY
Qty: 16 ML | Refills: 0 | Status: SHIPPED | OUTPATIENT
Start: 2025-04-10

## 2025-04-10 NOTE — TELEPHONE ENCOUNTER
Follow-up visit: none - due April  Last visit: 3/11/25 rtc 1 m  Last vitals: 3/11/25 (/60 HR 83)    Refill request from [facility] pharmacy for the following:   - fluticasone (Flonase) 50 mcg/actuation nasal spray - Administer 2 sprays into each nostril once daily. Shake gently. Before first use, prime pump. After use, clean tip and replace cap.  Approved: 30 days  DX Code H69.83  Pt needs to schedule her 1 month follow-up for further refills

## 2025-04-25 NOTE — PROGRESS NOTES
Division: Geriatrics  Date of Service: 4/28/2025  Visit Type: Geriatrics Specialty Clinic - Initial Consult Visit  Referral requested by:  patient's primary care physician, Marilee Yao DO     Chief complaint:  No chief complaint on file..     Subjective   Ms. Diana Azul is 84 y.o. year old female and here for comprehensive geriatric assessment.  Patient is {AGNEWPT:22133}. Ms. Diana Azul has a PMH of: anxiety (citalopram), chronic pruritus (UC/dermatology; triamcinolone, ivermectin), CLL, hypothyroidism    Here with ***. Additional history provided by: *** due to ***.    Review of records:   3/11/2025 PCP notes:   Did not refill citalopram*** but pt felt like it was still necessary  Going to reschedule appt with Behavioral Health Collaborative Care Team   +1 fall in 2/2025 when at brother's wedding  4/8/25  visit for CBT: ROJELIO-7 of 3, PHQ-9 of 6  Pt overall doing well; states that her anxiety currently is due to trying to find a dress for an upcoming family wedding and has a hem/onc appointment coming up in May and is trying to schedule a [facility] appointment/assessment. Discussed what the [facility] evaluation would look like and answered questions.   To follow-up in 3 weeks.  4/2/2025 psychiatry telehealth:  Known to behavioral health collaborative care since 2023: recommendations to increase bupropion and continue Celexa/citalopram at 20 mg p.o. daily. Currently patient is stable on medications.   She lives on her own.  Has 5 children who are supportive and local.  She reports that her daughter takes her to appointments although patient does still drive.  Patient is able to grocery shop on her own.   Recommendations: Cognitive disorder unspecified, depression unspecified, anxiety unspecified.  Recommend neuropsychological testing regarding baseline cognitive function.  Continue citalopram and bupropion as prescribed.  Continue to engage family support.  Behavioral health  manager to continue to monitor symptomology and offer support as needed     HPI     Hx from caregiver/s:  Short-term memory loss history:   Severity:{MILD, MOD, SEV:50511}  Duration: ***  Progression: {AGPROGRESSION:37895}  Timing: {AGTIMIN}  Associated symptoms:  Changes in mood/behavior: {AGSYMPTOMS:45340}  Changes in sleeping pattern: {YES wildcard/NO:60}  Wandering: {YES wildcard/NO:60}  Paranoia: {YES wildcard/NO:60}  REM-Sleep disorder symptoms: {YES wildcard/NO:60}  Driving safety issues: {YES wildcard/NO:60}  Medication safety issues: {YES wildcard/NO:60}  At risk for being scammed: {YES wildcard/NO:60}    Examples of memory loss:  ***    History of:  Stroke: {YES wildcard/NO:60}  Head trauma: {YES wildcard/NO:60}  Seizures: {YES wildcard/NO:60}  Toxin exposures: {YES wildcard/NO:60}  Delirium: {YES wildcard/NO:60}  Severe COVID-19: {YES wildcard/NO:60}  Cancer: {YES wildcard/NO:60}  Psychiatric diseases: {YES wildcard/NO:60}  Psychiatric hospitalization: {YES/NO/NA:20716}  RICH: {yes,no:96769} Using CPAP?: {AGYESNOWILDNA:00233}  Hearing loss: {yes/no:98871}  Hearing aids: {yes/no:85070}  OTC meds:***  Alcohol use disorder: ***  Illicit drug use:***  THC use:***    History from patient:  ***    Knows 89: {yes/no:79309}  Current President: ***  Upcoming holiday: ***  Current news:***  What matters most: ***    Social History    Level of Education: {Education:40158:o}  Occupation/Work Status: ***     halfway date (if applicable):     On any form of disability? {yes no:834985} If so, explain:            Advance Directives/Legal/Financial    Patient Healthcare POA: {NA or comment:39101}         Is Healthcare POA currently scanned into patient's chart: {NA or comment:64781}      DPOA for finances: {NA or comment:08641}       Legal guardian:  {NA or comment:73411}     Living Will: {yes no:912155}     ? {yes no:152175}      Family History      Biological Mother(status, age at death, cause of  death):      Biological Father(status, age at death, cause of death):      Biological Siblings(status, age at death, cause of death):      Extended family members with relevant health history:     Supportive Relationships (Informal Support)     Spouse/partner information (include length of relationship, health status):   Marital Status:  {[place] SOCIAL MARITAL STATUS:28402}      Children Information (include location, level of engagement):    ***     Other social supports: ***    Formal Supports     Engaged community services (Emergency Alert, HHA, MOW, Case Management, Etc.): {NA or comment:08681}    Mental Health     Sleep: ***     Energy: ***     Mood concerns noted by patient/family: {NA or comment:50031}     Relevant mental health history: {NA or comment:76545}     Self-harm/suicidal thoughts, plan: {desc; suicide ideation:45407}     Interests/Hobbies/Activities/Daily Routine: ***     Alcohol, illicit drug, and tobacco use reported by patient/family: ***    Additional Notes or Follow-up Matters: {NA or comment:65626}    CONCERNS IDENTIFIED BY PROVIDER DURING THIS VISIT:  ***  ***       What matters most: ***    Health Goals:    Goals    None         PCP: Marilee Yao DO    Medical records reviewed  Medical History[1]  Surgical History[2]  Family History[3]  Social History     Tobacco Use    Smoking status: Never    Smokeless tobacco: Never   Substance Use Topics    Alcohol use: Yes     Alcohol/week: 1.0 standard drink of alcohol     Types: 1 Glasses of wine per week       Geriatric ROS:  Visual: glasses {YES/NO:84568} Last exam: ***  Hearing: hearing {YES/NO:52184} Last exam: ***  Dental: dentures {YES/NO:36427} Last exam: ***  Sleep: how many hours at night ***, sleep aides: ***  History of Frequent falls:***  Mood:***      ENCOUNTER SCREENING RESULTS                               MIS: ***/15    Clock Drawing Test (CDT): ***/7 {AGCDT:91543}    Daily Functioning Assessment                         Safety        Living situation:       Advanced Directives on file: {advanced directive:63443}      Medications reviewed and reconciled.   Current Medications[4]    Objective   There were no vitals taken for this visit.  Physical Exam    Labs/Imaging reviewed.  Lab Results   Component Value Date    WBC 23.8 (H) 03/04/2025    HGB 12.3 03/04/2025    HCT 39.1 03/04/2025    MCV 94 03/04/2025     03/04/2025    LYMPHOPCT 57.7 03/04/2025    RBC 4.14 03/04/2025    MCH 29.7 03/04/2025    MCHC 31.5 (L) 03/04/2025    RDW 13.8 03/04/2025     Lab Results   Component Value Date     01/16/2025    K 4.0 01/16/2025     01/16/2025    CO2 27 01/16/2025    BUN 18 01/16/2025    CREATININE 0.68 01/16/2025    GLUCOSE 104 (H) 01/16/2025    CALCIUM 9.7 01/16/2025    PROT 6.6 01/16/2025    BILITOT 0.5 01/16/2025    ALKPHOS 63 01/16/2025    AST 18 01/16/2025    ALT 19 01/16/2025     Lab Results   Component Value Date    TSH 3.28 01/16/2025    TSH 2.77 10/26/2023    TSH 3.97 06/12/2023     Lab Results   Component Value Date    FREET4 0.92 10/03/2022     Lab Results   Component Value Date    WDNTDWOG75 645 03/25/2024    NJYVEIBT83 1,008 (H) 10/03/2022     Lab Results   Component Value Date    HGBA1C 5.7 (A) 07/20/2023    HGBA1C 5.4 07/19/2022    HGBA1C 5.7 (A) 01/24/2022     Lab Results   Component Value Date    VITD25 36 01/16/2025      CT head without contrast:  10/13/2022:     FINDINGS:  There is moderate brain parenchymal volume loss.     There are nonspecific white matter changes noted within the cerebral  hemispheres bilaterally which while nonspecific, given the patient's  age, likely represent sequelae of small vessel ischemic change.     No hyperdense acute intracranial hemorrhage is noted.     There is no midline shift.     The paranasal sinuses and mastoid air cells are clear.     IMPRESSION:  There is moderate brain parenchymal volume loss.     There are nonspecific white matter changes noted within the cerebral  hemispheres  bilaterally which while nonspecific, given the patient's  age, likely represent sequelae of small vessel ischemic change.  Assessment/Plan    {Assess/PlanSmartLinks:91943}    Discussed case with: {AGHXFROM:37929}    ***    Electronically signed by: Kriss Hirsch MD         [1]   Past Medical History:  Diagnosis Date    Anxiety     Compression fracture of T10 vertebra     Depression     Disorder of Eustachian tube, bilateral     GERD (gastroesophageal reflux disease)     Hiatal hernia     History of anxiety disorder 10/03/2013    HLD (hyperlipidemia)     Hypothyroid     Malignant melanoma of left shoulder     Memory deficit    [2] No past surgical history on file.  [3]   Family History  Problem Relation Name Age of Onset    Heart disease Father          congenital    Heart attack Father      Heart disease Brother          congenital   [4]   Current Outpatient Medications   Medication Sig Dispense Refill    buPROPion XL (Wellbutrin XL) 300 mg 24 hr tablet Take 1 tablet (300 mg) by mouth once daily. Do not crush, chew, or split. 90 tablet 1    cholecalciferol (Vitamin D-3) 50 MCG (2000 UT) tablet Take by mouth.      citalopram (CeleXA) 20 mg tablet Take 1 tablet (20 mg) by mouth once daily. 90 tablet 1    cyanocobalamin, vitamin B-12, (VITAMIN B-12 ORAL) Take by mouth. TH Vitamin B12 TABS      famotidine (Pepcid) 20 mg tablet Take 1 tablet (20 mg) by mouth 2 times a day. 60 tablet 5    fluticasone (Flonase) 50 mcg/actuation nasal spray Administer 2 sprays into each nostril once daily. Please schedule appointment for further refills 16 mL 0    hydrOXYzine pamoate (VistariL) 25 mg capsule Take 1 capsule (25 mg) by mouth 3 times a day as needed for itching for up to 10 days. 30 capsule 0    ivermectin (Stromectol) 3 mg tablet Take 1 tablet (3 mg) by mouth 1 time.      levothyroxine (Synthroid, Levoxyl) 25 mcg tablet Take 1 tablet (25 mcg) by mouth once daily. Take on an empty stomach as the same time each day 90  tablet 1    magnesium oxide (Mag-Ox) 400 mg tablet Take by mouth.      multivitamin (Daily Multi-Vitamin) tablet Take by mouth.      omeprazole (PriLOSEC) 40 mg DR capsule TAKE 1 CAPSULE (40 MG) BY MOUTH ONCE DAILY. DO NOT CRUSH OR CHEW. 90 capsule 1    propranolol (Inderal) 10 mg tablet TAKE 1 TABLET (10 MG) BY MOUTH 2 TIMES A DAY AS NEEDED (ANXIETY). 60 tablet 0    simvastatin (Zocor) 40 mg tablet Take 1 tablet (40 mg) by mouth once daily at bedtime. 90 tablet 1    triamcinolone (Kenalog) 0.1 % cream Apply topically 2 times a day. 30 g 0     No current facility-administered medications for this visit.

## 2025-04-27 PROBLEM — F41.1 GAD (GENERALIZED ANXIETY DISORDER): Status: ACTIVE | Noted: 2025-04-27

## 2025-04-28 ENCOUNTER — APPOINTMENT (OUTPATIENT)
Dept: GERIATRIC MEDICINE | Facility: HOSPITAL | Age: 85
End: 2025-04-28
Payer: MEDICARE

## 2025-04-28 DIAGNOSIS — F41.1 GAD (GENERALIZED ANXIETY DISORDER): ICD-10-CM

## 2025-04-28 DIAGNOSIS — F33.41 RECURRENT MAJOR DEPRESSIVE DISORDER, IN PARTIAL REMISSION (CMS-HCC): ICD-10-CM

## 2025-04-28 DIAGNOSIS — R41.3 MEMORY LOSS: Primary | ICD-10-CM

## 2025-04-28 DIAGNOSIS — Z71.89 ADVANCE CARE PLANNING: ICD-10-CM

## 2025-04-29 ENCOUNTER — APPOINTMENT (OUTPATIENT)
Dept: PRIMARY CARE | Facility: CLINIC | Age: 85
End: 2025-04-29
Payer: MEDICARE

## 2025-04-30 ENCOUNTER — DOCUMENTATION (OUTPATIENT)
Dept: PRIMARY CARE | Facility: CLINIC | Age: 85
End: 2025-04-30
Payer: MEDICARE

## 2025-04-30 DIAGNOSIS — F43.23 SITUATIONAL MIXED ANXIETY AND DEPRESSIVE DISORDER: Primary | ICD-10-CM

## 2025-04-30 LAB
ALBUMIN SERPL-MCNC: 4.5 G/DL (ref 3.6–5.1)
ALBUMIN/GLOB SERPL: 3 (CALC) (ref 1–2.5)
ALP SERPL-CCNC: 53 U/L (ref 37–153)
ALT SERPL-CCNC: 19 U/L (ref 6–29)
AST SERPL-CCNC: 18 U/L (ref 10–35)
BILIRUB SERPL-MCNC: 0.6 MG/DL (ref 0.2–1.2)
BUN SERPL-MCNC: 14 MG/DL (ref 7–25)
BUN/CREAT SERPL: ABNORMAL (CALC) (ref 6–22)
CALCIUM SERPL-MCNC: 9.3 MG/DL (ref 8.6–10.4)
CHLORIDE SERPL-SCNC: 107 MMOL/L (ref 98–110)
CO2 SERPL-SCNC: 25 MMOL/L (ref 20–32)
CREAT SERPL-MCNC: 0.77 MG/DL (ref 0.6–0.95)
EGFRCR SERPLBLD CKD-EPI 2021: 76 ML/MIN/1.73M2
GLOBULIN SER CALC-MCNC: 1.5 G/DL (CALC) (ref 1.9–3.7)
GLUCOSE SERPL-MCNC: 106 MG/DL (ref 65–99)
POTASSIUM SERPL-SCNC: 4.6 MMOL/L (ref 3.5–5.3)
PROT SERPL-MCNC: 6 G/DL (ref 6.1–8.1)
SODIUM SERPL-SCNC: 142 MMOL/L (ref 135–146)

## 2025-05-01 ENCOUNTER — TELEPHONE (OUTPATIENT)
Dept: PRIMARY CARE | Facility: CLINIC | Age: 85
End: 2025-05-01
Payer: MEDICARE

## 2025-05-01 NOTE — PROGRESS NOTES
M attempted to contact patient to reschedule appointment and to follow up on mental health care, medication, symptom management, and general wellbeing.  Unable to reach the patient and left a message requesting return phone call.     Called on 4/30/25 @ 8:45 am  Called on 5/1/25 @ 10:37 am

## 2025-05-05 ENCOUNTER — OFFICE VISIT (OUTPATIENT)
Dept: HEMATOLOGY/ONCOLOGY | Facility: CLINIC | Age: 85
End: 2025-05-05
Payer: MEDICARE

## 2025-05-05 VITALS
HEART RATE: 72 BPM | DIASTOLIC BLOOD PRESSURE: 88 MMHG | WEIGHT: 164.5 LBS | RESPIRATION RATE: 18 BRPM | OXYGEN SATURATION: 94 % | SYSTOLIC BLOOD PRESSURE: 131 MMHG | TEMPERATURE: 98.2 F | BODY MASS INDEX: 27.8 KG/M2

## 2025-05-05 DIAGNOSIS — C91.10 CLL (CHRONIC LYMPHOCYTIC LEUKEMIA) (MULTI): Primary | ICD-10-CM

## 2025-05-05 PROCEDURE — 1157F ADVNC CARE PLAN IN RCRD: CPT | Performed by: STUDENT IN AN ORGANIZED HEALTH CARE EDUCATION/TRAINING PROGRAM

## 2025-05-05 PROCEDURE — 99213 OFFICE O/P EST LOW 20 MIN: CPT | Performed by: STUDENT IN AN ORGANIZED HEALTH CARE EDUCATION/TRAINING PROGRAM

## 2025-05-05 PROCEDURE — 1123F ACP DISCUSS/DSCN MKR DOCD: CPT | Performed by: STUDENT IN AN ORGANIZED HEALTH CARE EDUCATION/TRAINING PROGRAM

## 2025-05-05 PROCEDURE — 1159F MED LIST DOCD IN RCRD: CPT | Performed by: STUDENT IN AN ORGANIZED HEALTH CARE EDUCATION/TRAINING PROGRAM

## 2025-05-05 PROCEDURE — 1126F AMNT PAIN NOTED NONE PRSNT: CPT | Performed by: STUDENT IN AN ORGANIZED HEALTH CARE EDUCATION/TRAINING PROGRAM

## 2025-05-05 RX ORDER — AZITHROMYCIN 250 MG/1
250 TABLET, FILM COATED ORAL DAILY
Qty: 6 TABLET | Refills: 0 | Status: SHIPPED | OUTPATIENT
Start: 2025-05-05 | End: 2025-05-10

## 2025-05-05 ASSESSMENT — PAIN SCALES - GENERAL: PAINLEVEL_OUTOF10: 0-NO PAIN

## 2025-05-05 NOTE — PROGRESS NOTES
History    Patient ID: Celena Azul is a 84 y.o. female.  Followed for CLL, has a mild cough which she states has a lingering bronchitis. No fever.   s/p surgery for melanoma       Review of Systems   All other systems reviewed and are negative.      Exam   Physical Exam  Vitals reviewed.   Constitutional:       Appearance: Normal appearance. She is normal weight.   HENT:      Head: Normocephalic and atraumatic.      Nose: Nose normal.      Mouth/Throat:      Mouth: Mucous membranes are moist.      Pharynx: Oropharynx is clear.   Eyes:      Conjunctiva/sclera: Conjunctivae normal.      Pupils: Pupils are equal, round, and reactive to light.   Cardiovascular:      Rate and Rhythm: Normal rate and regular rhythm.      Pulses: Normal pulses.      Heart sounds: Normal heart sounds.   Pulmonary:      Effort: Pulmonary effort is normal.      Breath sounds: Normal breath sounds.   Abdominal:      General: Abdomen is flat. Bowel sounds are normal.      Palpations: Abdomen is soft.   Musculoskeletal:      Cervical back: Normal range of motion.   Skin:     General: Skin is warm and dry.   Neurological:      General: No focal deficit present.      Mental Status: She is alert and oriented to person, place, and time. Mental status is at baseline.   Psychiatric:         Mood and Affect: Mood normal.         Behavior: Behavior normal.         Thought Content: Thought content normal.         Judgment: Judgment normal.       Diagnostic Results   Lab Results   Component Value Date    WBC 23.8 (H) 03/04/2025    HGB 12.3 03/04/2025    HCT 39.1 03/04/2025    MCV 94 03/04/2025     03/04/2025     Lab Results   Component Value Date    GLUCOSE 106 (H) 04/29/2025    CALCIUM 9.3 04/29/2025     04/29/2025    K 4.6 04/29/2025    CO2 25 04/29/2025     04/29/2025    BUN 14 04/29/2025    CREATININE 0.77 04/29/2025       Assessment/Plan   Diagnoses and all orders for this visit:  CLL (chronic lymphocytic leukemia) (Multi)  -      CBC and Auto Differential; Future  -     Clinic Appointment Request Follow Up  -     azithromycin (Zithromax) 250 mg tablet; Take 1 tablet (250 mg) by mouth once daily for 5 days. Take 2 tabs (500 mg) by mouth today, then take 1 tab daily for 4 days.  - CLL testing consistent with low risk disease, repeat CBC stable, plan for every 6 month monitoring with labs and vist  - z theo for bronchitis  - follows for melanoma with deramtologist  20 minutes wehre spent reviewing the patients chart, discussing symptoms, performing physical exam, reviewing lab results with patient and going over the plan of care

## 2025-05-05 NOTE — PATIENT INSTRUCTIONS
We will schedule you for a follow up visit in 6 months with labs   You will be prescribed an antibiotic (Z-pack) to be taken for 5 days, please let us know if your symptoms worsen   Please call with any additional questions or concerns

## 2025-05-06 ENCOUNTER — TELEPHONE (OUTPATIENT)
Dept: PRIMARY CARE | Facility: CLINIC | Age: 85
End: 2025-05-06
Payer: MEDICARE

## 2025-05-06 NOTE — PROGRESS NOTES
Spoke with patient to check in from cancelled appointment.  Has been sick with a cough and itchiness has started again.  Was prescribed a z-pack yesterday.   States she hasn't been feeling well for a few months and needs to get a second opinion, thus has a NPV with a different PCP in June.  Pt stated she will call [facility] back next week to discuss ongoing mental health care.

## 2025-05-15 ENCOUNTER — OFFICE VISIT (OUTPATIENT)
Dept: URGENT CARE | Age: 85
End: 2025-05-15
Payer: MEDICARE

## 2025-05-15 VITALS
OXYGEN SATURATION: 97 % | HEART RATE: 78 BPM | DIASTOLIC BLOOD PRESSURE: 98 MMHG | TEMPERATURE: 96 F | SYSTOLIC BLOOD PRESSURE: 168 MMHG | RESPIRATION RATE: 18 BRPM

## 2025-05-15 DIAGNOSIS — H69.83 OTHER SPECIFIED DISORDERS OF EUSTACHIAN TUBE, BILATERAL: ICD-10-CM

## 2025-05-15 DIAGNOSIS — R21 RASH: Primary | ICD-10-CM

## 2025-05-15 RX ORDER — PREDNISONE 20 MG/1
40 TABLET ORAL DAILY
Qty: 20 TABLET | Refills: 0 | Status: SHIPPED | OUTPATIENT
Start: 2025-05-15 | End: 2025-05-25

## 2025-05-15 NOTE — PROGRESS NOTES
"Subjective   Patient ID: Diana Azul \"Celena\" is a 85 y.o. female. They present today with a chief complaint of Rash (Pt states, rash all over, couple months ago, wakes up at 5am because of the rash. Pt states she doesn't know if she has worms, \"butt\" itches.\" She states last year she had scabies. ).    History of Present Illness    Rash        85-year-old female presents today with concerns of a rash that had presented a couple months ago.  She endorses that she wakes up at 5 AM every morning due to the itchiness of the rash.  She did have scabies last year from staying in a hotel room and she is concerned that this is what she is dealing with now.  She has not changed any of her soaps, laundry detergent, lotions, perfumes.  She also endorses experiencing some anal itchiness.  She denies any recent illness or other recent travel.  Past Medical History  Allergies as of 05/15/2025    (No Known Allergies)       Prescriptions Prior to Admission[1]     Medical History[2]    Surgical History[3]     reports that she has never smoked. She has never used smokeless tobacco. She reports current alcohol use of about 1.0 standard drink of alcohol per week. She reports that she does not use drugs.    Review of Systems  Review of Systems   Skin:  Positive for rash.                                  Objective    Vitals:    05/15/25 1344   BP: (!) 168/98   BP Location: Right arm   Patient Position: Sitting   Pulse: 78   Resp: 18   Temp: 35.6 °C (96 °F)   TempSrc: Oral   SpO2: 97%     No LMP recorded. Patient is postmenopausal.    Physical Exam  Skin:     Comments: Sporadic rash all over bilateral arms and legs, as well as trunk. Small red bumps          Procedures    Point of Care Test & Imaging Results from this visit  No results found for this visit on 05/15/25.   Imaging  No results found.    Cardiology, Vascular, and Other Imaging  No other imaging results found for the past 2 days      Diagnostic study results (if any) " were reviewed by CHRIS Adams.    Assessment/Plan   Allergies, medications, history, and pertinent labs/EKGs/Imaging reviewed by CHRIS Adams.     Medical Decision Making  Patient was agreeable to a prescription for prednisone; she did call her dermatologist and made an appointment for next week on the 21st.  We discussed also taking Claritin during the day and Benadryl at night to help with the itching. As a result of the work-up, the patient was discharged home.  she was informed of her diagnosis and instructed to come back with any concerns or worsening of condition.  she and was agreeable to the plan as discussed above.  she was given the opportunity to ask questions.  All of the patient's questions were answered.      Orders and Diagnoses  Diagnoses and all orders for this visit:  Rash  -     predniSONE (Deltasone) 20 mg tablet; Take 2 tablets (40 mg) by mouth once daily for 10 days.      Medical Admin Record      Patient disposition: Home    Electronically signed by CHRIS Adams  2:07 PM           [1] (Not in a hospital admission)   [2]   Past Medical History:  Diagnosis Date    Anxiety     Compression fracture of T10 vertebra     Depression     Disorder of Eustachian tube, bilateral     GERD (gastroesophageal reflux disease)     Hiatal hernia     History of anxiety disorder 10/03/2013    HLD (hyperlipidemia)     Hypothyroid     Malignant melanoma of left shoulder     Memory deficit    [3] History reviewed. No pertinent surgical history.

## 2025-05-22 RX ORDER — FLUTICASONE PROPIONATE 50 MCG
2 SPRAY, SUSPENSION (ML) NASAL DAILY
Qty: 16 G | Refills: 0 | Status: SHIPPED | OUTPATIENT
Start: 2025-05-22

## 2025-05-22 NOTE — TELEPHONE ENCOUNTER
Follow-up visit: none - due April  Last visit: 3/11/25 rtc 1 m  Last vitals: 3/11/25 (/60 HR 83)     Refill request from [facility] pharmacy for the following:   - fluticasone (Flonase) 50 mcg/actuation nasal spray - Administer 2 sprays into each nostril once daily. Shake gently. Before first use, prime pump. After use, clean tip and replace cap.  Approved: 30 days  DX Code H69.83  Pt needs to schedule her 1 month follow-up for further refills but noted pt is seeing a new PCP so will fill enough until then

## 2025-06-06 ENCOUNTER — TELEPHONE (OUTPATIENT)
Dept: PRIMARY CARE | Facility: CLINIC | Age: 85
End: 2025-06-06
Payer: MEDICARE

## 2025-06-20 DIAGNOSIS — H69.83 OTHER SPECIFIED DISORDERS OF EUSTACHIAN TUBE, BILATERAL: ICD-10-CM

## 2025-06-23 ENCOUNTER — APPOINTMENT (OUTPATIENT)
Dept: PRIMARY CARE | Facility: CLINIC | Age: 85
End: 2025-06-23
Payer: MEDICARE

## 2025-06-23 VITALS
BODY MASS INDEX: 28 KG/M2 | WEIGHT: 164 LBS | DIASTOLIC BLOOD PRESSURE: 86 MMHG | HEART RATE: 89 BPM | TEMPERATURE: 97.4 F | HEIGHT: 64 IN | SYSTOLIC BLOOD PRESSURE: 138 MMHG | OXYGEN SATURATION: 97 %

## 2025-06-23 DIAGNOSIS — R03.0 ELEVATED BP WITHOUT DIAGNOSIS OF HYPERTENSION: Primary | ICD-10-CM

## 2025-06-23 DIAGNOSIS — E78.2 MIXED HYPERLIPIDEMIA: ICD-10-CM

## 2025-06-23 DIAGNOSIS — E03.9 ADULT HYPOTHYROIDISM: ICD-10-CM

## 2025-06-23 DIAGNOSIS — C91.10 CLL (CHRONIC LYMPHOCYTIC LEUKEMIA) (MULTI): ICD-10-CM

## 2025-06-23 PROCEDURE — 1159F MED LIST DOCD IN RCRD: CPT | Performed by: FAMILY MEDICINE

## 2025-06-23 PROCEDURE — G2211 COMPLEX E/M VISIT ADD ON: HCPCS | Performed by: FAMILY MEDICINE

## 2025-06-23 PROCEDURE — 99214 OFFICE O/P EST MOD 30 MIN: CPT | Performed by: FAMILY MEDICINE

## 2025-06-23 PROCEDURE — 1160F RVW MEDS BY RX/DR IN RCRD: CPT | Performed by: FAMILY MEDICINE

## 2025-06-23 PROCEDURE — 1158F ADVNC CARE PLAN TLK DOCD: CPT | Performed by: FAMILY MEDICINE

## 2025-06-23 RX ORDER — FLUTICASONE PROPIONATE 50 MCG
2 SPRAY, SUSPENSION (ML) NASAL DAILY
Qty: 48 ML | Refills: 1 | Status: SHIPPED | OUTPATIENT
Start: 2025-06-23

## 2025-06-23 ASSESSMENT — ENCOUNTER SYMPTOMS
LOSS OF SENSATION IN FEET: 0
DEPRESSION: 0
OCCASIONAL FEELINGS OF UNSTEADINESS: 1

## 2025-06-23 ASSESSMENT — PATIENT HEALTH QUESTIONNAIRE - PHQ9
1. LITTLE INTEREST OR PLEASURE IN DOING THINGS: NOT AT ALL
SUM OF ALL RESPONSES TO PHQ9 QUESTIONS 1 AND 2: 0
2. FEELING DOWN, DEPRESSED OR HOPELESS: NOT AT ALL

## 2025-06-23 NOTE — PROGRESS NOTES
"Assessment/Plan    Problem List Items Addressed This Visit       Elevated BP without diagnosis of hypertension - Primary    Mixed hyperlipidemia    Adult hypothyroidism    CLL (chronic lymphocytic leukemia) (Multi)       Chronic conditions stable at this time; no changes will follow up in 6 months for annual wellness exam.       Subjective   Patient ID: Celena Azul is a 85 y.o. female who presents for Establish Care.       Tobacco Use History[1]    Screening Tests:   Lung Cancer Screening: not indicated   Colon Cancer Screening: not indicated.   Breast Cancer Screening: not interested.   Cervical Cancer Screening: not indicated.    DXA Screenin/15/22     Had a growth on back o fhte arm; had this removed likely skin cancer    Hx of CLL diagnosed approx `1 year ago   Follows w/ heme/onc     Care Management Team:   Mario onc: Myriam   Dermatology : Dr. Durán   Labwork: up to date     Family History updated in chart   Social History updated in Chart   Up to date on dental visits; no change to vision / hearing.     Other concerns today:   Eczema - clobetasol       Objective   /86 (BP Location: Left arm, Patient Position: Sitting, BP Cuff Size: Adult)   Pulse 89   Temp 36.3 °C (97.4 °F) (Skin)   Ht 1.626 m (5' 4\")   Wt 74.4 kg (164 lb)   SpO2 97%   BMI 28.15 kg/m²       Physical Exam  Constitutional:       General: He is not in acute distress.     Appearance: Normal appearance. He is not ill-appearing, toxic-appearing or diaphoretic.   HENT:      Head: Normocephalic and atraumatic.   Eyes:      Extraocular Movements: Extraocular movements intact.      Pupils: Pupils are equal, round, and reactive to light.   Cardiovascular:      Rate and Rhythm: Normal rate and regular rhythm.      Pulses: Normal pulses.      Heart sounds: Normal heart sounds.   Pulmonary:      Effort: Pulmonary effort is normal.      Breath sounds: Normal breath sounds.   Neurological:      Mental Status: He is alert. "         Oumou Hastings,      I spent a total of 20 minutes on the date of the service which included preparing to see the patient, face-to-face patient care, completing clinical documentation, performing a medically appropriate examination, counseling and educating the patient/family/caregiver and ordering medications, tests, or procedures.           [1]   Social History  Tobacco Use   Smoking Status Never    Passive exposure: Never   Smokeless Tobacco Never

## 2025-06-30 ENCOUNTER — DOCUMENTATION (OUTPATIENT)
Dept: PRIMARY CARE | Facility: CLINIC | Age: 85
End: 2025-06-30
Payer: MEDICARE

## 2025-07-02 DIAGNOSIS — F41.9 ANXIETY: ICD-10-CM

## 2025-07-08 RX ORDER — PROPRANOLOL HYDROCHLORIDE 10 MG/1
10 TABLET ORAL 2 TIMES DAILY PRN
Qty: 180 TABLET | Refills: 1 | Status: SHIPPED | OUTPATIENT
Start: 2025-07-08

## 2025-07-12 DIAGNOSIS — K21.9 GASTRO-ESOPHAGEAL REFLUX DISEASE WITHOUT ESOPHAGITIS: ICD-10-CM

## 2025-07-19 DIAGNOSIS — K21.9 GASTROESOPHAGEAL REFLUX DISEASE WITHOUT ESOPHAGITIS: ICD-10-CM

## 2025-07-24 DIAGNOSIS — K21.9 GASTROESOPHAGEAL REFLUX DISEASE WITHOUT ESOPHAGITIS: ICD-10-CM

## 2025-07-24 DIAGNOSIS — K21.9 GASTRO-ESOPHAGEAL REFLUX DISEASE WITHOUT ESOPHAGITIS: ICD-10-CM

## 2025-07-24 RX ORDER — FAMOTIDINE 20 MG/1
20 TABLET, FILM COATED ORAL 2 TIMES DAILY
Qty: 180 TABLET | Refills: 1 | Status: SHIPPED | OUTPATIENT
Start: 2025-07-24 | End: 2026-01-20

## 2025-07-24 RX ORDER — FAMOTIDINE 20 MG/1
20 TABLET, FILM COATED ORAL 2 TIMES DAILY
Qty: 180 TABLET | Refills: 1 | OUTPATIENT
Start: 2025-07-24

## 2025-07-24 RX ORDER — OMEPRAZOLE 40 MG/1
40 CAPSULE, DELAYED RELEASE ORAL DAILY
Qty: 90 CAPSULE | Refills: 1 | OUTPATIENT
Start: 2025-07-24

## 2025-07-24 RX ORDER — OMEPRAZOLE 40 MG/1
40 CAPSULE, DELAYED RELEASE ORAL DAILY
Qty: 90 CAPSULE | Refills: 1 | Status: SHIPPED | OUTPATIENT
Start: 2025-07-24

## 2025-07-30 DIAGNOSIS — E03.9 ADULT HYPOTHYROIDISM: ICD-10-CM

## 2025-08-04 DIAGNOSIS — E03.9 ADULT HYPOTHYROIDISM: ICD-10-CM

## 2025-08-04 RX ORDER — LEVOTHYROXINE SODIUM 25 UG/1
25 TABLET ORAL DAILY
Qty: 90 TABLET | Refills: 3 | Status: SHIPPED | OUTPATIENT
Start: 2025-08-04

## 2025-08-04 RX ORDER — LEVOTHYROXINE SODIUM 25 UG/1
25 TABLET ORAL DAILY
Qty: 90 TABLET | Refills: 1 | OUTPATIENT
Start: 2025-08-04

## 2025-08-07 ENCOUNTER — OFFICE VISIT (OUTPATIENT)
Dept: DERMATOLOGY | Facility: CLINIC | Age: 85
End: 2025-08-07
Payer: MEDICARE

## 2025-08-07 DIAGNOSIS — R21 RASH AND OTHER NONSPECIFIC SKIN ERUPTION: Primary | ICD-10-CM

## 2025-08-07 PROCEDURE — 11105 PUNCH BX SKIN EA SEP/ADDL: CPT | Performed by: NURSE PRACTITIONER

## 2025-08-07 PROCEDURE — 1159F MED LIST DOCD IN RCRD: CPT | Performed by: NURSE PRACTITIONER

## 2025-08-07 PROCEDURE — 99204 OFFICE O/P NEW MOD 45 MIN: CPT | Performed by: NURSE PRACTITIONER

## 2025-08-07 PROCEDURE — 11104 PUNCH BX SKIN SINGLE LESION: CPT | Performed by: NURSE PRACTITIONER

## 2025-08-07 NOTE — Clinical Note
- Discussed differential with patient in clinic today.   - Given uncertainty in clinical diagnosis, biopsy is recommended in clinic today.  - The patient expressed understanding, is in agreement with this plan, and wishes to proceed with biopsy.  - Oral and written wound care instructions provided.  - Advised patient that the office will call within 2 weeks to discuss biopsy results.

## 2025-08-07 NOTE — PROGRESS NOTES
"Amanda Azul \"Celena\" is a 85 y.o. female who presents for the following: Rash (Generalized. Pt and pt's daughter's states sites appear as red pin point spots that are extremely itchy. Pt's daughters state treatment with clobetasol cream, hydroxyzine tablet, and permethrin cream with no response. Pt states treatment with OTC itch creams with no response./Accompanied by Ivana and Ebony- daughters.).          Review of Systems:  No other skin or systemic complaints other than what is documented elsewhere in the note.    The following portions of the chart were reviewed this encounter and updated as appropriate:          Skin Cancer History  Biopsy Log Book  No skin cancers from Specimen Tracking.    Additional History      Specialty Problems          Dermatology Problems    Dermatitis, eczematoid    Malignant melanoma of left shoulder (Multi)        Objective   Well appearing patient in no apparent distress; mood and affect are within normal limits.    A focused skin examination was performed. All findings within normal limits unless otherwise noted below.    Assessment/Plan   Skin Exam  1. RASH AND OTHER NONSPECIFIC SKIN ERUPTION  Right Breast, right chest  Erythematous patch(es)/plaque(s)    - Discussed differential with patient in clinic today.   - Given uncertainty in clinical diagnosis, biopsy is recommended in clinic today.  - The patient expressed understanding, is in agreement with this plan, and wishes to proceed with biopsy.  - Oral and written wound care instructions provided.  - Advised patient that the office will call within 2 weeks to discuss biopsy results.    - Lesion biopsy - Right Breast, right chest  Type of biopsy: punch    Informed consent: discussed and consent obtained    Timeout: patient name, date of birth, surgical site, and procedure verified    Punch size:  4 mm  Suture size:  4-0  Suture type: Prolene (polypropylene)    Hemostasis achieved with: suture  "   Post-procedure details: wound care instructions given      - Staff Communication: Dermatology Local Anesthesia: 1 % Lidocaine / Epinephrine - Amount: 1ml - Right Breast, right chest  Specimen 1 - Dermatopathology- DERM LAB  Differential Diagnosis: Eczematous dermatitis vs urticarial vs BP  Check Margins Yes/No?:    Comments:    Dermpath Lab: Routine Histopathology (formalin-fixed tissue)    Specimen 2 - Dermatopathology- DERM LAB  Differential Diagnosis: Eczematous vs urticarial vs BP vs other  Check Margins Yes/No?:    Comments:    Dermpath Lab: DIF

## 2025-08-21 ENCOUNTER — APPOINTMENT (OUTPATIENT)
Dept: DERMATOLOGY | Facility: CLINIC | Age: 85
End: 2025-08-21
Payer: MEDICARE

## 2025-08-21 DIAGNOSIS — R21 RASH AND OTHER NONSPECIFIC SKIN ERUPTION: Primary | ICD-10-CM

## 2025-08-21 PROCEDURE — 1159F MED LIST DOCD IN RCRD: CPT | Performed by: NURSE PRACTITIONER

## 2025-08-21 PROCEDURE — 99214 OFFICE O/P EST MOD 30 MIN: CPT | Performed by: NURSE PRACTITIONER

## 2025-08-21 RX ORDER — DOXYCYCLINE 100 MG/1
100 CAPSULE ORAL 2 TIMES DAILY
Qty: 20 CAPSULE | Refills: 0 | Status: SHIPPED | OUTPATIENT
Start: 2025-08-21 | End: 2025-08-31

## 2025-08-21 RX ORDER — CLOBETASOL PROPIONATE 0.5 MG/G
1 CREAM TOPICAL
COMMUNITY
Start: 2025-08-06

## 2025-08-23 LAB
ALBUMIN SERPL-MCNC: 4.2 G/DL (ref 3.6–5.1)
ALP SERPL-CCNC: 73 U/L (ref 37–153)
ALT SERPL-CCNC: 16 U/L (ref 6–29)
ANION GAP SERPL CALCULATED.4IONS-SCNC: 11 MMOL/L (CALC) (ref 7–17)
AST SERPL-CCNC: 14 U/L (ref 10–35)
BASOPHILS # BLD AUTO: 39 CELLS/UL (ref 0–200)
BASOPHILS NFR BLD AUTO: 0.2 %
BILIRUB SERPL-MCNC: 0.6 MG/DL (ref 0.2–1.2)
BUN SERPL-MCNC: 11 MG/DL (ref 7–25)
CALCIUM SERPL-MCNC: 9.3 MG/DL (ref 8.6–10.4)
CHLORIDE SERPL-SCNC: 105 MMOL/L (ref 98–110)
CO2 SERPL-SCNC: 26 MMOL/L (ref 20–32)
CREAT SERPL-MCNC: 0.72 MG/DL (ref 0.6–0.95)
CRP SERPL-MCNC: NORMAL MG/L
EGFRCR SERPLBLD CKD-EPI 2021: 81 ML/MIN/1.73M2
EOSINOPHIL # BLD AUTO: 194 CELLS/UL (ref 15–500)
EOSINOPHIL NFR BLD AUTO: 1 %
ERYTHROCYTE [DISTWIDTH] IN BLOOD BY AUTOMATED COUNT: 14.5 % (ref 11–15)
ERYTHROCYTE [SEDIMENTATION RATE] IN BLOOD BY WESTERGREN METHOD: 34 MM/H
GLUCOSE SERPL-MCNC: 128 MG/DL (ref 65–99)
HCT VFR BLD AUTO: 34.1 % (ref 35–45)
HGB BLD-MCNC: 11.2 G/DL (ref 11.7–15.5)
LYMPHOCYTES # BLD AUTO: 9816 CELLS/UL (ref 850–3900)
LYMPHOCYTES NFR BLD AUTO: 50.6 %
MCH RBC QN AUTO: 31.7 PG (ref 27–33)
MCHC RBC AUTO-ENTMCNC: 32.8 G/DL (ref 32–36)
MCV RBC AUTO: 96.6 FL (ref 80–100)
MONOCYTES # BLD AUTO: 1242 CELLS/UL (ref 200–950)
MONOCYTES NFR BLD AUTO: 6.4 %
NEUTROPHILS # BLD AUTO: 8109 CELLS/UL (ref 1500–7800)
NEUTROPHILS NFR BLD AUTO: 41.8 %
PLATELET # BLD AUTO: 175 THOUSAND/UL (ref 140–400)
PMV BLD REES-ECKER: 11.2 FL (ref 7.5–12.5)
POTASSIUM SERPL-SCNC: 4.6 MMOL/L (ref 3.5–5.3)
PROT SERPL-MCNC: 6.1 G/DL (ref 6.1–8.1)
RBC # BLD AUTO: 3.53 MILLION/UL (ref 3.8–5.1)
SODIUM SERPL-SCNC: 142 MMOL/L (ref 135–146)
WBC # BLD AUTO: 19.4 THOUSAND/UL (ref 3.8–10.8)

## 2025-08-25 DIAGNOSIS — F41.9 ANXIETY DISORDER, UNSPECIFIED: ICD-10-CM

## 2025-08-25 LAB
ALBUMIN SERPL-MCNC: 4.2 G/DL (ref 3.6–5.1)
ALP SERPL-CCNC: 73 U/L (ref 37–153)
ALT SERPL-CCNC: 16 U/L (ref 6–29)
ANION GAP SERPL CALCULATED.4IONS-SCNC: 11 MMOL/L (CALC) (ref 7–17)
AST SERPL-CCNC: 14 U/L (ref 10–35)
BASOPHILS # BLD AUTO: 39 CELLS/UL (ref 0–200)
BASOPHILS NFR BLD AUTO: 0.2 %
BILIRUB SERPL-MCNC: 0.6 MG/DL (ref 0.2–1.2)
BUN SERPL-MCNC: 11 MG/DL (ref 7–25)
CALCIUM SERPL-MCNC: 9.3 MG/DL (ref 8.6–10.4)
CHLORIDE SERPL-SCNC: 105 MMOL/L (ref 98–110)
CO2 SERPL-SCNC: 26 MMOL/L (ref 20–32)
CREAT SERPL-MCNC: 0.72 MG/DL (ref 0.6–0.95)
CRP SERPL-MCNC: 108 MG/L
EGFRCR SERPLBLD CKD-EPI 2021: 81 ML/MIN/1.73M2
EOSINOPHIL # BLD AUTO: 194 CELLS/UL (ref 15–500)
EOSINOPHIL NFR BLD AUTO: 1 %
ERYTHROCYTE [DISTWIDTH] IN BLOOD BY AUTOMATED COUNT: 14.5 % (ref 11–15)
ERYTHROCYTE [SEDIMENTATION RATE] IN BLOOD BY WESTERGREN METHOD: 34 MM/H
GLUCOSE SERPL-MCNC: 128 MG/DL (ref 65–99)
HCT VFR BLD AUTO: 34.1 % (ref 35–45)
HGB BLD-MCNC: 11.2 G/DL (ref 11.7–15.5)
LYMPHOCYTES # BLD AUTO: 9816 CELLS/UL (ref 850–3900)
LYMPHOCYTES NFR BLD AUTO: 50.6 %
MCH RBC QN AUTO: 31.7 PG (ref 27–33)
MCHC RBC AUTO-ENTMCNC: 32.8 G/DL (ref 32–36)
MCV RBC AUTO: 96.6 FL (ref 80–100)
MONOCYTES # BLD AUTO: 1242 CELLS/UL (ref 200–950)
MONOCYTES NFR BLD AUTO: 6.4 %
NEUTROPHILS # BLD AUTO: 8109 CELLS/UL (ref 1500–7800)
NEUTROPHILS NFR BLD AUTO: 41.8 %
PLATELET # BLD AUTO: 175 THOUSAND/UL (ref 140–400)
PMV BLD REES-ECKER: 11.2 FL (ref 7.5–12.5)
POTASSIUM SERPL-SCNC: 4.6 MMOL/L (ref 3.5–5.3)
PROT SERPL-MCNC: 6.1 G/DL (ref 6.1–8.1)
RBC # BLD AUTO: 3.53 MILLION/UL (ref 3.8–5.1)
SODIUM SERPL-SCNC: 142 MMOL/L (ref 135–146)
WBC # BLD AUTO: 19.4 THOUSAND/UL (ref 3.8–10.8)

## 2025-08-25 RX ORDER — BUPROPION HYDROCHLORIDE 150 MG/1
150 TABLET ORAL DAILY
Qty: 30 TABLET | Refills: 0 | Status: SHIPPED | OUTPATIENT
Start: 2025-08-25 | End: 2025-09-02 | Stop reason: WASHOUT

## 2025-08-27 ENCOUNTER — TELEPHONE (OUTPATIENT)
Dept: DERMATOLOGY | Facility: CLINIC | Age: 85
End: 2025-08-27
Payer: MEDICARE

## 2025-08-27 RX ORDER — PIMECROLIMUS 10 MG/G
CREAM TOPICAL
Qty: 100 G | Refills: 2 | Status: SHIPPED | OUTPATIENT
Start: 2025-08-27

## 2025-09-02 ENCOUNTER — APPOINTMENT (OUTPATIENT)
Dept: PRIMARY CARE | Facility: CLINIC | Age: 85
End: 2025-09-02
Payer: MEDICARE

## 2025-09-02 ASSESSMENT — ENCOUNTER SYMPTOMS
LOSS OF SENSATION IN FEET: 0
DEPRESSION: 1
OCCASIONAL FEELINGS OF UNSTEADINESS: 0

## 2025-12-22 ENCOUNTER — APPOINTMENT (OUTPATIENT)
Dept: PRIMARY CARE | Facility: CLINIC | Age: 85
End: 2025-12-22
Payer: MEDICARE

## 2026-01-08 ENCOUNTER — APPOINTMENT (OUTPATIENT)
Dept: ALLERGY | Facility: CLINIC | Age: 86
End: 2026-01-08
Payer: MEDICARE

## (undated) DEVICE — DRAPE, TIBURON, SPLIT SHEET, REINF ADH STRIP, 77X122

## (undated) DEVICE — APPLICATOR, CHLORAPREP, W/ORANGE TINT, 26ML

## (undated) DEVICE — CAUTERY, PENCIL, PUSH BUTTON, SMOKE EVAC, 70MM

## (undated) DEVICE — DRAPE PACK, MINOR, CUSTOM, GEAUGA

## (undated) DEVICE — APPLIER,  LIGACLIP MULTI CLIP, 30 MED 11 1/2

## (undated) DEVICE — DRESSING, NON-ADHERENT, TELFA, OUCHLESS, 3 X 8 IN, STERILE

## (undated) DEVICE — SUTURE, SILK, 2-0, TIES, 12-30 IN, BLACK

## (undated) DEVICE — HEMOSTAT, ARISTA, ABSORBABLE, 3 GRAMS

## (undated) DEVICE — CONTAINER, SPECIMEN, 120ML

## (undated) DEVICE — PROBE, TRUNODE GAMMA

## (undated) DEVICE — STOCKINETTE, IMPERVIOUS, 9 X 48 IN, STERILE

## (undated) DEVICE — SUTURE, SILK, 2-0, 30 IN, SH, BLACK

## (undated) DEVICE — SUTURE, MONOCRYL, 4-0, 18 IN, PS2, UNDYED

## (undated) DEVICE — SUTURE, CTD, VICRYL, 2-0, UND, BR, CT-2

## (undated) DEVICE — DRAPE, INSTRUMENT, W/POUCH, STERI DRAPE, 7 X 11 IN, DISPOSABLE, STERILE

## (undated) DEVICE — SOLUTION, IRRIGATION, SODIUM CHLORIDE 0.9%, 1000 ML, POUR BOTTLE

## (undated) DEVICE — ELECTRODE, ELECTROSURGICAL, BLADE, INSULATED, ENT/IMA, STERILE

## (undated) DEVICE — DRAPE, SHEET, FAN FOLDED, HALF, 44 X 58 IN, DISPOSABLE, LF, STERILE

## (undated) DEVICE — ADHESIVE, SKIN, DERMABOND ADVANCED, 15CM, PEN-STYLE

## (undated) DEVICE — BANDAGE, COFLEX, 4 X 5 YDS, TAN, STERILE, LF

## (undated) DEVICE — SUTURE, VICRYL, 3-0, 27 IN, SH